# Patient Record
Sex: FEMALE
[De-identification: names, ages, dates, MRNs, and addresses within clinical notes are randomized per-mention and may not be internally consistent; named-entity substitution may affect disease eponyms.]

---

## 2017-08-02 ENCOUNTER — TRANSCRIPTION ENCOUNTER (OUTPATIENT)
Age: 82
End: 2017-08-02

## 2017-08-02 ENCOUNTER — OUTPATIENT (OUTPATIENT)
Dept: EMERGENCY DEPT | Facility: HOSPITAL | Age: 82
LOS: 1 days | Discharge: HOME | End: 2017-08-02
Payer: MEDICARE

## 2017-08-02 VITALS
DIASTOLIC BLOOD PRESSURE: 72 MMHG | RESPIRATION RATE: 16 BRPM | HEART RATE: 49 BPM | TEMPERATURE: 97 F | SYSTOLIC BLOOD PRESSURE: 123 MMHG | OXYGEN SATURATION: 100 %

## 2017-08-02 DIAGNOSIS — Z98.890 OTHER SPECIFIED POSTPROCEDURAL STATES: Chronic | ICD-10-CM

## 2017-08-02 DIAGNOSIS — E89.0 POSTPROCEDURAL HYPOTHYROIDISM: Chronic | ICD-10-CM

## 2017-08-02 DIAGNOSIS — Z90.49 ACQUIRED ABSENCE OF OTHER SPECIFIED PARTS OF DIGESTIVE TRACT: Chronic | ICD-10-CM

## 2017-08-02 DIAGNOSIS — Z95.1 PRESENCE OF AORTOCORONARY BYPASS GRAFT: Chronic | ICD-10-CM

## 2017-08-02 DIAGNOSIS — R07.9 CHEST PAIN, UNSPECIFIED: ICD-10-CM

## 2017-08-02 LAB
ALBUMIN SERPL ELPH-MCNC: 4.5 G/DL — SIGNIFICANT CHANGE UP (ref 3.3–5)
ALP SERPL-CCNC: 65 U/L — SIGNIFICANT CHANGE UP (ref 40–120)
ALT FLD-CCNC: 33 U/L RC — SIGNIFICANT CHANGE UP (ref 10–45)
ANION GAP SERPL CALC-SCNC: 12 MMOL/L — SIGNIFICANT CHANGE UP (ref 5–17)
APTT BLD: 33.4 SEC — SIGNIFICANT CHANGE UP (ref 27.5–37.4)
AST SERPL-CCNC: 25 U/L — SIGNIFICANT CHANGE UP (ref 10–40)
BILIRUB SERPL-MCNC: 0.6 MG/DL — SIGNIFICANT CHANGE UP (ref 0.2–1.2)
BUN SERPL-MCNC: 27 MG/DL — HIGH (ref 7–23)
CALCIUM SERPL-MCNC: 9.2 MG/DL — SIGNIFICANT CHANGE UP (ref 8.4–10.5)
CHLORIDE SERPL-SCNC: 98 MMOL/L — SIGNIFICANT CHANGE UP (ref 96–108)
CK MB BLD-MCNC: 1.7 % — SIGNIFICANT CHANGE UP (ref 0–3.5)
CK MB CFR SERPL CALC: 2.6 NG/ML — SIGNIFICANT CHANGE UP (ref 0–3.8)
CK SERPL-CCNC: 150 U/L — SIGNIFICANT CHANGE UP (ref 25–170)
CO2 SERPL-SCNC: 29 MMOL/L — SIGNIFICANT CHANGE UP (ref 22–31)
CREAT SERPL-MCNC: 1.46 MG/DL — HIGH (ref 0.5–1.3)
GLUCOSE SERPL-MCNC: 98 MG/DL — SIGNIFICANT CHANGE UP (ref 70–99)
HCT VFR BLD CALC: 45 % — SIGNIFICANT CHANGE UP (ref 34.5–45)
HGB BLD-MCNC: 14.8 G/DL — SIGNIFICANT CHANGE UP (ref 11.5–15.5)
INR BLD: 1.03 RATIO — SIGNIFICANT CHANGE UP (ref 0.88–1.16)
MCHC RBC-ENTMCNC: 29.2 PG — SIGNIFICANT CHANGE UP (ref 27–34)
MCHC RBC-ENTMCNC: 32.9 GM/DL — SIGNIFICANT CHANGE UP (ref 32–36)
MCV RBC AUTO: 88.6 FL — SIGNIFICANT CHANGE UP (ref 80–100)
PLATELET # BLD AUTO: 191 K/UL — SIGNIFICANT CHANGE UP (ref 150–400)
POTASSIUM SERPL-MCNC: 4.6 MMOL/L — SIGNIFICANT CHANGE UP (ref 3.5–5.3)
POTASSIUM SERPL-SCNC: 4.6 MMOL/L — SIGNIFICANT CHANGE UP (ref 3.5–5.3)
PROT SERPL-MCNC: 8.2 G/DL — SIGNIFICANT CHANGE UP (ref 6–8.3)
PROTHROM AB SERPL-ACNC: 11.2 SEC — SIGNIFICANT CHANGE UP (ref 9.8–12.7)
RBC # BLD: 5.08 M/UL — SIGNIFICANT CHANGE UP (ref 3.8–5.2)
RBC # FLD: 13 % — SIGNIFICANT CHANGE UP (ref 10.3–14.5)
SODIUM SERPL-SCNC: 139 MMOL/L — SIGNIFICANT CHANGE UP (ref 135–145)
TROPONIN T SERPL-MCNC: <0.01 NG/ML — SIGNIFICANT CHANGE UP (ref 0–0.06)
WBC # BLD: 8.8 K/UL — SIGNIFICANT CHANGE UP (ref 3.8–10.5)
WBC # FLD AUTO: 8.8 K/UL — SIGNIFICANT CHANGE UP (ref 3.8–10.5)

## 2017-08-02 PROCEDURE — 92928 PRQ TCAT PLMT NTRAC ST 1 LES: CPT | Mod: LC,GC

## 2017-08-02 PROCEDURE — 99152 MOD SED SAME PHYS/QHP 5/>YRS: CPT | Mod: GC

## 2017-08-02 PROCEDURE — 71010: CPT | Mod: 26

## 2017-08-02 PROCEDURE — 93455 CORONARY ART/GRFT ANGIO S&I: CPT | Mod: 26,59,GC

## 2017-08-02 PROCEDURE — 93010 ELECTROCARDIOGRAM REPORT: CPT

## 2017-08-02 PROCEDURE — 99285 EMERGENCY DEPT VISIT HI MDM: CPT | Mod: GC

## 2017-08-02 RX ORDER — INSULIN LISPRO 100/ML
VIAL (ML) SUBCUTANEOUS AT BEDTIME
Refills: 0 | Status: DISCONTINUED | OUTPATIENT
Start: 2017-08-02 | End: 2017-08-03

## 2017-08-02 RX ORDER — DEXTROSE 50 % IN WATER 50 %
25 SYRINGE (ML) INTRAVENOUS ONCE
Refills: 0 | Status: DISCONTINUED | OUTPATIENT
Start: 2017-08-02 | End: 2017-08-03

## 2017-08-02 RX ORDER — LISINOPRIL 2.5 MG/1
10 TABLET ORAL DAILY
Refills: 0 | Status: DISCONTINUED | OUTPATIENT
Start: 2017-08-02 | End: 2017-08-03

## 2017-08-02 RX ORDER — ACETAMINOPHEN 500 MG
650 TABLET ORAL EVERY 6 HOURS
Refills: 0 | Status: DISCONTINUED | OUTPATIENT
Start: 2017-08-02 | End: 2017-08-03

## 2017-08-02 RX ORDER — DEXTROSE 50 % IN WATER 50 %
1 SYRINGE (ML) INTRAVENOUS ONCE
Refills: 0 | Status: DISCONTINUED | OUTPATIENT
Start: 2017-08-02 | End: 2017-08-03

## 2017-08-02 RX ORDER — SODIUM CHLORIDE 9 MG/ML
1000 INJECTION, SOLUTION INTRAVENOUS
Refills: 0 | Status: DISCONTINUED | OUTPATIENT
Start: 2017-08-02 | End: 2017-08-03

## 2017-08-02 RX ORDER — ASPIRIN/CALCIUM CARB/MAGNESIUM 324 MG
81 TABLET ORAL DAILY
Refills: 0 | Status: DISCONTINUED | OUTPATIENT
Start: 2017-08-02 | End: 2017-08-03

## 2017-08-02 RX ORDER — LEVOTHYROXINE SODIUM 125 MCG
100 TABLET ORAL DAILY
Refills: 0 | Status: DISCONTINUED | OUTPATIENT
Start: 2017-08-02 | End: 2017-08-03

## 2017-08-02 RX ORDER — FUROSEMIDE 40 MG
40 TABLET ORAL DAILY
Refills: 0 | Status: DISCONTINUED | OUTPATIENT
Start: 2017-08-02 | End: 2017-08-03

## 2017-08-02 RX ORDER — ALLOPURINOL 300 MG
100 TABLET ORAL DAILY
Refills: 0 | Status: DISCONTINUED | OUTPATIENT
Start: 2017-08-02 | End: 2017-08-03

## 2017-08-02 RX ORDER — DEXTROSE 50 % IN WATER 50 %
12.5 SYRINGE (ML) INTRAVENOUS ONCE
Refills: 0 | Status: DISCONTINUED | OUTPATIENT
Start: 2017-08-02 | End: 2017-08-03

## 2017-08-02 RX ORDER — INSULIN LISPRO 100/ML
3 VIAL (ML) SUBCUTANEOUS
Refills: 0 | Status: DISCONTINUED | OUTPATIENT
Start: 2017-08-02 | End: 2017-08-03

## 2017-08-02 RX ORDER — NITROGLYCERIN 6.5 MG
1 CAPSULE, EXTENDED RELEASE ORAL ONCE
Refills: 0 | Status: COMPLETED | OUTPATIENT
Start: 2017-08-02 | End: 2017-08-02

## 2017-08-02 RX ORDER — CLOPIDOGREL BISULFATE 75 MG/1
75 TABLET, FILM COATED ORAL DAILY
Refills: 0 | Status: DISCONTINUED | OUTPATIENT
Start: 2017-08-02 | End: 2017-08-03

## 2017-08-02 RX ORDER — SODIUM CHLORIDE 9 MG/ML
1000 INJECTION INTRAMUSCULAR; INTRAVENOUS; SUBCUTANEOUS
Refills: 0 | Status: DISCONTINUED | OUTPATIENT
Start: 2017-08-02 | End: 2017-08-03

## 2017-08-02 RX ORDER — SODIUM CHLORIDE 9 MG/ML
1000 INJECTION INTRAMUSCULAR; INTRAVENOUS; SUBCUTANEOUS
Refills: 0 | Status: DISCONTINUED | OUTPATIENT
Start: 2017-08-02 | End: 2017-08-02

## 2017-08-02 RX ORDER — ATORVASTATIN CALCIUM 80 MG/1
80 TABLET, FILM COATED ORAL AT BEDTIME
Refills: 0 | Status: DISCONTINUED | OUTPATIENT
Start: 2017-08-02 | End: 2017-08-03

## 2017-08-02 RX ORDER — GLUCAGON INJECTION, SOLUTION 0.5 MG/.1ML
1 INJECTION, SOLUTION SUBCUTANEOUS ONCE
Refills: 0 | Status: DISCONTINUED | OUTPATIENT
Start: 2017-08-02 | End: 2017-08-03

## 2017-08-02 RX ORDER — SPIRONOLACTONE 25 MG/1
25 TABLET, FILM COATED ORAL DAILY
Refills: 0 | Status: DISCONTINUED | OUTPATIENT
Start: 2017-08-02 | End: 2017-08-03

## 2017-08-02 RX ORDER — INSULIN GLARGINE 100 [IU]/ML
35 INJECTION, SOLUTION SUBCUTANEOUS AT BEDTIME
Refills: 0 | Status: DISCONTINUED | OUTPATIENT
Start: 2017-08-02 | End: 2017-08-03

## 2017-08-02 RX ORDER — METOPROLOL TARTRATE 50 MG
100 TABLET ORAL
Refills: 0 | Status: DISCONTINUED | OUTPATIENT
Start: 2017-08-02 | End: 2017-08-03

## 2017-08-02 RX ORDER — INSULIN LISPRO 100/ML
VIAL (ML) SUBCUTANEOUS
Refills: 0 | Status: DISCONTINUED | OUTPATIENT
Start: 2017-08-02 | End: 2017-08-03

## 2017-08-02 RX ADMIN — Medication 1 INCH(S): at 14:41

## 2017-08-02 RX ADMIN — Medication 100 MILLIGRAM(S): at 18:16

## 2017-08-02 RX ADMIN — INSULIN GLARGINE 35 UNIT(S): 100 INJECTION, SOLUTION SUBCUTANEOUS at 22:15

## 2017-08-02 RX ADMIN — ATORVASTATIN CALCIUM 80 MILLIGRAM(S): 80 TABLET, FILM COATED ORAL at 22:15

## 2017-08-02 NOTE — ED ADULT NURSE NOTE - OBJECTIVE STATEMENT
81yo female AxOx3 ambulatory to ED c/o CP x2wks radiating to L arm and neck. Pt had silent MI 1yr ago and states pain is similar to event but a little worse. Pt denies SOB/N/V/D/fever/chills. Steady gait. Denies cough. 83yo female AxOx3 ambulatory to ED c/o CP x2wks radiating to L arm and neck. Pt had silent MI 1yr ago and states pain is similar to event but a little worse. Pt denies SOB/N/V/D/fever/chills. Steady gait. Denies cough. B/L lungs CTA, resp even, unlabored. Full ROMx4. Abd soft/NT/ND/+BSx4. Skin intact. NAD noted. Non-diaphoretic. #18G LAC, labs drawn and sent. Spouse at bedside. Safety maintained.

## 2017-08-02 NOTE — H&P CARDIOLOGY - PSH
H/O aortic valve repair    H/O mitral valve repair    H/O thyroidectomy    History of cholecystectomy    S/P CABG x 3

## 2017-08-02 NOTE — ED PROVIDER NOTE - MEDICAL DECISION MAKING DETAILS
Likely unstable angina, EKG with sinus bradycardia and PVCs. D/w Cardiology fellow, plan to admit to Dr. Urbano for cardiac cath

## 2017-08-02 NOTE — H&P CARDIOLOGY - PMH
Coronary artery disease    Diabetes    Gout    HTN (hypertension)    Hypothyroid    Valvular heart disease Chronic heart failure, unspecified heart failure type    Coronary artery disease    Diabetes    Gout    HTN (hypertension)    Hypothyroid    Valvular heart disease Chronic heart failure, unspecified heart failure type    CKD (chronic kidney disease) stage 3, GFR 30-59 ml/min    Coronary artery disease    Diabetes    Gout    HTN (hypertension)    Hypothyroid    Valvular heart disease

## 2017-08-02 NOTE — ED PROVIDER NOTE - ATTENDING CONTRIBUTION TO CARE
I have seen and evaluated this patient with the resident.   I agree with the findings  unless other wise stated.  I have made appropriate changes in documentations where needed, After my face to face bedside evaluation, I am further  noting: Pt with chest pain with change in severity no fever no syncope h/o CAD Likely unstable angina, EKG with sinus bradycardia and PVCs. D/w Cardiology fellow, plan to admit to Dr. Urbano for cardiac cath  _ Carr

## 2017-08-02 NOTE — H&P CARDIOLOGY - HISTORY OF PRESENT ILLNESS
81yo F PMH Hypothyrodism, CAD s/p CABG, s/p 25 stents, last PCI in 03/2013 with brachytherapy, AV and MV repair, DM2 on Insulin, uncontrolled complicated by retinopathy, managed by Endocrinology, TIA no residual deficits, sent by Cardiologist - Dr. Lam Painting, 2/2 worsening chest pain x weeks. Pt reports chronic angina for many years, but has worsened in the past several weeks. Pain occurs at rest, left-sided, radiating to L arm, describes as needles, rates it 3/10, lasts for a few mins, uses nitro spray with relief. Frequency of chest pain has increased. Denies any associated SOB, diaphoresis, nausea, vomiting. Denies fevers, chills. Endorses YOUNG but states she walks 5 miles regularly.  Patient received 325mg of ASA @ Cardiologist office.  In ED Cardiac biomarkers negative x 1 set, ProBNP 400, Nitropaste applied with relief of CP.  Patient is now transferred to Cath lab for further evaluation including LHC. 81yo F PMH HFpEF 68% in 2016, Hypothyroidism CAD s/p CABG, s/p 25 stents, last PCI in 03/2013 with brachytherapy, AV and MV repair, DM2 on Insulin, uncontrolled complicated by retinopathy, managed by Endocrinology, TIA no residual deficits, sent by Cardiologist - Dr. Lam Painting, 2/2 worsening chest pain x weeks. Pt reports chronic angina for many years, but has worsened in the past several weeks. Pain occurs at rest, left-sided, radiating to L arm, describes as needles, rates it 3/10, lasts for a few mins, uses nitro spray with relief. Frequency of chest pain has increased. Denies any associated SOB, diaphoresis, nausea, vomiting. Denies fevers, chills. Endorses YOUNG but states she walks 5 miles regularly.  Patient received 325mg of ASA @ Cardiologist office.  In ED Cardiac biomarkers negative x 1 set, ProBNP 400, Nitropaste applied with relief of CP.  Patient is now transferred to Cath lab for further evaluation including LHC. 81yo F PMH CKD III, HFpEF 68% in 2016, Hypothyroidism CAD s/p CABG, s/p 25 stents, last PCI in 03/2013 with brachytherapy, AV and MV repair, DM2 on Insulin, uncontrolled complicated by retinopathy, managed by Endocrinology, TIA no residual deficits, sent by Cardiologist - Dr. Lam Painting, 2/2 worsening chest pain x weeks. Pt reports chronic angina for many years, but has worsened in the past several weeks. Pain occurs at rest, left-sided, radiating to L arm, describes as needles, rates it 3/10, lasts for a few mins, uses nitro spray with relief. Frequency of chest pain has increased. Denies any associated SOB, diaphoresis, nausea, vomiting. Denies fevers, chills. Endorses YOUNG but states she walks 5 miles regularly.  Patient received 325mg of ASA @ Cardiologist office.  In ED Cardiac biomarkers negative x 1 set, ProBNP 400, Nitropaste applied with relief of CP.  Patient is now transferred to Cath lab for further evaluation including LHC.

## 2017-08-02 NOTE — PROGRESS NOTE ADULT - SUBJECTIVE AND OBJECTIVE BOX
Removal of Femoral Sheath    Pulses in the right lower extremity are palpable. The patient was placed in the supine position. The insertion site was identified and the sutures were removed per protocol.  The 6 [place] femoral sheath was then removed. Direct pressure was applied for 20 minutes.     Monitoring of the right groin and both lower extremities including neuro-vascular checks and vital signs every 15 minutes x 4, then every 30 minutes x 2, then every 1 hour was ordered.    Complications: None    Comments:

## 2017-08-02 NOTE — ED PROVIDER NOTE - OBJECTIVE STATEMENT
81yo F PMH CAD s/p CABG, s/p 25 stents, last PCI in 03/2013 with brachytherapy, AVR and MV repair, DM2, TIA, sent by PMD for worsening chest pain x weeks. Pt reports chronic angina for many years, but has worsened in the past several weeks. Pain occurs at rest, left-sided, radiating to L arm, describes as needles, rates it 3/10, lasts for a few mins, uses nitro spray with relief. Frequency of chest pain has increased. Denies any associated SOB, diaphoresis, nausea, vomiting. Denies fevers, chills. Endorses YOUNG but states she walks 5 miles regularly.

## 2017-08-03 VITALS
TEMPERATURE: 97 F | RESPIRATION RATE: 17 BRPM | DIASTOLIC BLOOD PRESSURE: 57 MMHG | HEART RATE: 54 BPM | SYSTOLIC BLOOD PRESSURE: 120 MMHG | OXYGEN SATURATION: 96 %

## 2017-08-03 DIAGNOSIS — E11.9 TYPE 2 DIABETES MELLITUS WITHOUT COMPLICATIONS: ICD-10-CM

## 2017-08-03 DIAGNOSIS — I10 ESSENTIAL (PRIMARY) HYPERTENSION: ICD-10-CM

## 2017-08-03 DIAGNOSIS — I25.118 ATHEROSCLEROTIC HEART DISEASE OF NATIVE CORONARY ARTERY WITH OTHER FORMS OF ANGINA PECTORIS: ICD-10-CM

## 2017-08-03 LAB
ANION GAP SERPL CALC-SCNC: 10 MMOL/L — SIGNIFICANT CHANGE UP (ref 5–17)
BUN SERPL-MCNC: 26 MG/DL — HIGH (ref 7–23)
CALCIUM SERPL-MCNC: 8.9 MG/DL — SIGNIFICANT CHANGE UP (ref 8.4–10.5)
CHLORIDE SERPL-SCNC: 100 MMOL/L — SIGNIFICANT CHANGE UP (ref 96–108)
CO2 SERPL-SCNC: 28 MMOL/L — SIGNIFICANT CHANGE UP (ref 22–31)
CREAT SERPL-MCNC: 1.42 MG/DL — HIGH (ref 0.5–1.3)
GLUCOSE SERPL-MCNC: 179 MG/DL — HIGH (ref 70–99)
HCT VFR BLD CALC: 39.7 % — SIGNIFICANT CHANGE UP (ref 34.5–45)
HGB BLD-MCNC: 13.5 G/DL — SIGNIFICANT CHANGE UP (ref 11.5–15.5)
MCHC RBC-ENTMCNC: 29.8 PG — SIGNIFICANT CHANGE UP (ref 27–34)
MCHC RBC-ENTMCNC: 33.9 GM/DL — SIGNIFICANT CHANGE UP (ref 32–36)
MCV RBC AUTO: 88 FL — SIGNIFICANT CHANGE UP (ref 80–100)
PLATELET # BLD AUTO: 142 K/UL — LOW (ref 150–400)
POTASSIUM SERPL-MCNC: 5.1 MMOL/L — SIGNIFICANT CHANGE UP (ref 3.5–5.3)
POTASSIUM SERPL-SCNC: 5.1 MMOL/L — SIGNIFICANT CHANGE UP (ref 3.5–5.3)
RBC # BLD: 4.51 M/UL — SIGNIFICANT CHANGE UP (ref 3.8–5.2)
RBC # FLD: 12.9 % — SIGNIFICANT CHANGE UP (ref 10.3–14.5)
SODIUM SERPL-SCNC: 138 MMOL/L — SIGNIFICANT CHANGE UP (ref 135–145)
WBC # BLD: 7.5 K/UL — SIGNIFICANT CHANGE UP (ref 3.8–10.5)
WBC # FLD AUTO: 7.5 K/UL — SIGNIFICANT CHANGE UP (ref 3.8–10.5)

## 2017-08-03 PROCEDURE — 85027 COMPLETE CBC AUTOMATED: CPT

## 2017-08-03 PROCEDURE — 80048 BASIC METABOLIC PNL TOTAL CA: CPT

## 2017-08-03 PROCEDURE — 99153 MOD SED SAME PHYS/QHP EA: CPT

## 2017-08-03 PROCEDURE — C9600: CPT | Mod: LC

## 2017-08-03 PROCEDURE — 82553 CREATINE MB FRACTION: CPT

## 2017-08-03 PROCEDURE — C1887: CPT

## 2017-08-03 PROCEDURE — 85610 PROTHROMBIN TIME: CPT

## 2017-08-03 PROCEDURE — 93005 ELECTROCARDIOGRAM TRACING: CPT

## 2017-08-03 PROCEDURE — 83880 ASSAY OF NATRIURETIC PEPTIDE: CPT

## 2017-08-03 PROCEDURE — 85730 THROMBOPLASTIN TIME PARTIAL: CPT

## 2017-08-03 PROCEDURE — 93455 CORONARY ART/GRFT ANGIO S&I: CPT | Mod: 59

## 2017-08-03 PROCEDURE — 71045 X-RAY EXAM CHEST 1 VIEW: CPT

## 2017-08-03 PROCEDURE — C1769: CPT

## 2017-08-03 PROCEDURE — 84484 ASSAY OF TROPONIN QUANT: CPT

## 2017-08-03 PROCEDURE — C1874: CPT

## 2017-08-03 PROCEDURE — 80053 COMPREHEN METABOLIC PANEL: CPT

## 2017-08-03 PROCEDURE — 93010 ELECTROCARDIOGRAM REPORT: CPT

## 2017-08-03 PROCEDURE — 82550 ASSAY OF CK (CPK): CPT

## 2017-08-03 PROCEDURE — C1894: CPT

## 2017-08-03 PROCEDURE — C1725: CPT

## 2017-08-03 PROCEDURE — 99152 MOD SED SAME PHYS/QHP 5/>YRS: CPT

## 2017-08-03 RX ORDER — ACETAMINOPHEN 500 MG
2 TABLET ORAL
Qty: 0 | Refills: 0 | DISCHARGE
Start: 2017-08-03

## 2017-08-03 RX ADMIN — LISINOPRIL 10 MILLIGRAM(S): 2.5 TABLET ORAL at 05:36

## 2017-08-03 RX ADMIN — Medication 40 MILLIGRAM(S): at 05:37

## 2017-08-03 RX ADMIN — SPIRONOLACTONE 25 MILLIGRAM(S): 25 TABLET, FILM COATED ORAL at 05:36

## 2017-08-03 RX ADMIN — Medication 100 MICROGRAM(S): at 05:36

## 2017-08-03 RX ADMIN — Medication 81 MILLIGRAM(S): at 05:36

## 2017-08-03 RX ADMIN — Medication 1 INCH(S): at 02:12

## 2017-08-03 RX ADMIN — CLOPIDOGREL BISULFATE 75 MILLIGRAM(S): 75 TABLET, FILM COATED ORAL at 05:36

## 2017-08-03 NOTE — DISCHARGE NOTE ADULT - HOSPITAL COURSE
81yo F PMH CKD III, HFpEF 68% in 2016, Hypothyroidism CAD s/p CABG, s/p 25 stents, last PCI in 03/2013 with brachytherapy, AV and MV repair, DM2 on Insulin, uncontrolled complicated by retinopathy, managed by Endocrinology, TIA no residual deficits, sent by Cardiologist - Dr. Lam Painting, 2/2 worsening chest pain x weeks. Pt reports chronic angina for many years, but has worsened in the past several weeks. Pain occurs at rest, left-sided, radiating to L arm, describes as needles, rates it 3/10, lasts for a few mins, uses nitro spray with relief. Frequency of chest pain has increased. Denies any associated SOB, diaphoresis, nausea, vomiting. Denies fevers, chills. Endorses YOUNG but states she walks 5 miles regularly.  Patient received 325mg of ASA @ Cardiologist office.  In ED Cardiac biomarkers negative x 1 set, ProBNP 400, Nitropaste applied with relief of CP.  Patient is now transferred to Cath lab for further evaluation including LHC. (02 Aug 2017 15:39)    8/2 cardiac cath with one stent to the OM1. Right femoral insertion site without swelling, bleeding.

## 2017-08-03 NOTE — PROGRESS NOTE ADULT - SUBJECTIVE AND OBJECTIVE BOX
2- Hours Post Removal of Femoral Sheath Assessment of Access Site    Vital signs are stable, neuro-vascular status of the lower extremities is intact, stable and there is no evidence of hematoma on the right lower extremities.     Complications: None      Comments:

## 2017-08-03 NOTE — DISCHARGE NOTE ADULT - CARE PLAN
Principal Discharge DX:	Coronary artery disease of native artery of native heart with stable angina pectoris  Goal:	Patient remains chest pain free and understands post cath discharge instructions.  Instructions for follow-up, activity and diet:	No heavy lifting, strenuous activity, bending, straining, or unnecessary stair climbing for 2 weeks. No driving for 2 days. You may shower 24 hours following the procedure but avoid baths/swimming for 1 week. Check your groin site for bleeding and/or swelling daily following procedure and call your doctor immediately if it occurs or if you experience increased pain at the site. Follow up with your cardiologist in 1-2 weeks. You may call [facility] if you have any questions/concerns regarding your procedure [phone]. Do not stop you Aspirin or Plavix unless instructed to do so by your cardiologist.  Secondary Diagnosis:	Essential hypertension  Goal:	Your blood pressure will be controlled.  Instructions for follow-up, activity and diet:	Continue with your blood pressure medications; eat a heart healthy diet with low salt diet; exercise regularly (consult with your physician or cardiologist first); maintain a heart healthy weight; if you smoke - quit (A resource to help you stop smoking is the [facility] – phone number 805-008-0937.); include healthy ways to manage stress. Continue to follow with your primary care physician or cardiologist.  Secondary Diagnosis:	Type 2 diabetes mellitus without complication, with long-term current use of insulin  Goal:	Your hemoglobin A1C will be between 7-8  Instructions for follow-up, activity and diet:	Continue to follow with your primary care MD or your endocrinologist.  Follow a heart healthy diabetic diet. If you check your fingerstick glucose at home, call your MD if it is greater than 250mg/dL on 2 occasions or less than 100mg/dL on 2 occasions. Know signs of low blood sugar, such as: dizziness, shakiness, sweating, confusion, hunger, nervousness-drink 4 ounces apple juice if occurs and call your doctor. Know early signs of high blood sugar, such as: frequent urination, increased thirst, blurry vision, fatigue, headache - call your doctor if this occurs. Follow with other practitioners to care for your diabetes, such as ophthamologist and podiatrist. Principal Discharge DX:	Coronary artery disease of native artery of native heart with stable angina pectoris  Goal:	Patient remains chest pain free and understands post cath discharge instructions.  Instructions for follow-up, activity and diet:	No heavy lifting, strenuous activity, bending, straining, or unnecessary stair climbing for 2 weeks. No driving for 2 days. You may shower 24 hours following the procedure but avoid baths/swimming for 1 week. Check your groin site for bleeding and/or swelling daily following procedure and call your doctor immediately if it occurs or if you experience increased pain at the site. Follow up with your cardiologist in 1-2 weeks. You may call [facility] if you have any questions/concerns regarding your procedure [phone]. Do not stop you Aspirin or Plavix unless instructed to do so by your cardiologist.  Secondary Diagnosis:	Essential hypertension  Goal:	Your blood pressure will be controlled.  Instructions for follow-up, activity and diet:	Continue with your blood pressure medications; eat a heart healthy diet with low salt diet; exercise regularly (consult with your physician or cardiologist first); maintain a heart healthy weight; if you smoke - quit (A resource to help you stop smoking is the [facility] – phone number 262-846-3918.); include healthy ways to manage stress. Continue to follow with your primary care physician or cardiologist.  Secondary Diagnosis:	Type 2 diabetes mellitus without complication, with long-term current use of insulin  Goal:	Your hemoglobin A1C will be between 7-8  Instructions for follow-up, activity and diet:	Continue to follow with your primary care MD or your endocrinologist.  Follow a heart healthy diabetic diet. If you check your fingerstick glucose at home, call your MD if it is greater than 250mg/dL on 2 occasions or less than 100mg/dL on 2 occasions. Know signs of low blood sugar, such as: dizziness, shakiness, sweating, confusion, hunger, nervousness-drink 4 ounces apple juice if occurs and call your doctor. Know early signs of high blood sugar, such as: frequent urination, increased thirst, blurry vision, fatigue, headache - call your doctor if this occurs. Follow with other practitioners to care for your diabetes, such as ophthamologist and podiatrist. Principal Discharge DX:	Coronary artery disease of native artery of native heart with stable angina pectoris  Goal:	Patient remains chest pain free and understands post cath discharge instructions.  Instructions for follow-up, activity and diet:	No heavy lifting, strenuous activity, bending, straining, or unnecessary stair climbing for 2 weeks. No driving for 2 days. You may shower 24 hours following the procedure but avoid baths/swimming for 1 week. Check your groin site for bleeding and/or swelling daily following procedure and call your doctor immediately if it occurs or if you experience increased pain at the site. Follow up with your cardiologist in 1-2 weeks. You may call [facility] if you have any questions/concerns regarding your procedure [phone]. Do not stop you Aspirin or Plavix unless instructed to do so by your cardiologist.  Secondary Diagnosis:	Essential hypertension  Goal:	Your blood pressure will be controlled.  Instructions for follow-up, activity and diet:	Continue with your blood pressure medications; eat a heart healthy diet with low salt diet; exercise regularly (consult with your physician or cardiologist first); maintain a heart healthy weight; if you smoke - quit (A resource to help you stop smoking is the [facility] – phone number 758-020-1515.); include healthy ways to manage stress. Continue to follow with your primary care physician or cardiologist.  Secondary Diagnosis:	Type 2 diabetes mellitus without complication, with long-term current use of insulin  Goal:	Your hemoglobin A1C will be between 7-8  Instructions for follow-up, activity and diet:	Continue to follow with your primary care MD or your endocrinologist.  Follow a heart healthy diabetic diet. If you check your fingerstick glucose at home, call your MD if it is greater than 250mg/dL on 2 occasions or less than 100mg/dL on 2 occasions. Know signs of low blood sugar, such as: dizziness, shakiness, sweating, confusion, hunger, nervousness-drink 4 ounces apple juice if occurs and call your doctor. Know early signs of high blood sugar, such as: frequent urination, increased thirst, blurry vision, fatigue, headache - call your doctor if this occurs. Follow with other practitioners to care for your diabetes, such as ophthamologist and podiatrist. Principal Discharge DX:	Coronary artery disease of native artery of native heart with stable angina pectoris  Goal:	Patient remains chest pain free and understands post cath discharge instructions.  Instructions for follow-up, activity and diet:	No heavy lifting, strenuous activity, bending, straining, or unnecessary stair climbing for 2 weeks. No driving for 2 days. You may shower 24 hours following the procedure but avoid baths/swimming for 1 week. Check your groin site for bleeding and/or swelling daily following procedure and call your doctor immediately if it occurs or if you experience increased pain at the site. Follow up with your cardiologist in 1-2 weeks. You may call [facility] if you have any questions/concerns regarding your procedure [phone]. Do not stop you Aspirin or Plavix unless instructed to do so by your cardiologist.  Secondary Diagnosis:	Essential hypertension  Goal:	Your blood pressure will be controlled.  Instructions for follow-up, activity and diet:	Continue with your blood pressure medications; eat a heart healthy diet with low salt diet; exercise regularly (consult with your physician or cardiologist first); maintain a heart healthy weight; if you smoke - quit (A resource to help you stop smoking is the [facility] – phone number 724-701-8404.); include healthy ways to manage stress. Continue to follow with your primary care physician or cardiologist.  Secondary Diagnosis:	Type 2 diabetes mellitus without complication, with long-term current use of insulin  Goal:	Your hemoglobin A1C will be between 7-8  Instructions for follow-up, activity and diet:	Continue to follow with your primary care MD or your endocrinologist.  Follow a heart healthy diabetic diet. If you check your fingerstick glucose at home, call your MD if it is greater than 250mg/dL on 2 occasions or less than 100mg/dL on 2 occasions. Know signs of low blood sugar, such as: dizziness, shakiness, sweating, confusion, hunger, nervousness-drink 4 ounces apple juice if occurs and call your doctor. Know early signs of high blood sugar, such as: frequent urination, increased thirst, blurry vision, fatigue, headache - call your doctor if this occurs. Follow with other practitioners to care for your diabetes, such as ophthamologist and podiatrist.

## 2017-08-03 NOTE — DISCHARGE NOTE ADULT - PATIENT PORTAL LINK FT
“You can access the FollowHealth Patient Portal, offered by [facility], by registering with the following website: [email or web address]”

## 2017-08-03 NOTE — PROGRESS NOTE ADULT - ASSESSMENT
HPI:  81yo F PMH CKD III, HFpEF 68% in 2016, Hypothyroidism CAD s/p CABG, s/p 25 stents, last PCI in 03/2013 with brachytherapy, AV and MV repair, DM2 on Insulin, uncontrolled complicated by retinopathy, managed by Endocrinology, TIA no residual deficits, sent by Cardiologist - Dr. Lam Painting, 2/2 worsening chest pain x weeks. Pt reports chronic angina for many years, but has worsened in the past several weeks. Pain occurs at rest, left-sided, radiating to L arm, describes as needles, rates it 3/10, lasts for a few mins, uses nitro spray with relief. Frequency of chest pain has increased. Denies any associated SOB, diaphoresis, nausea, vomiting. Denies fevers, chills. Endorses YOUNG but states she walks 5 miles regularly.  Patient received 325mg of ASA @ Cardiologist office.  In ED Cardiac biomarkers negative x 1 set, ProBNP 400, Nitropaste applied with relief of CP.  Patient is now transferred to Cath lab for further evaluation including LHC. (02 Aug 2017 15:39)

## 2017-08-03 NOTE — DISCHARGE NOTE ADULT - MEDICATION SUMMARY - MEDICATIONS TO TAKE
I will START or STAY ON the medications listed below when I get home from the hospital:    spironolactone 25 mg oral tablet  -- 1 tab(s) by mouth once a day  -- Indication: For Hypertension    acetaminophen 325 mg oral tablet  -- 2 tab(s) by mouth every 6 hours, As needed, Mild Pain (1 - 3)  -- Indication: For mild pain    Aspirin Enteric Coated 81 mg oral delayed release tablet  -- 1 tab(s) by mouth once a day  -- Indication: For Stented coronary artery    ramipril 2.5 mg oral capsule  -- 1 cap(s) by mouth once a day  -- Indication: For Hypertension    Nitrolingual 0.4 mg sublingual spray  -- 1 spray(s) under tongue every 5 minutes, As Needed  -- Indication: For angina    HumaLOG 100 units/mL subcutaneous solution  -- 6 unit(s) subcutaneous 2 times a day  -- Indication: For diabetes type 2    Levemir 100 units/mL subcutaneous solution  -- 44 unit(s) subcutaneous once a day (at bedtime)  -- Indication: For diabetes type 2    glimepiride 4 mg oral tablet  -- 1 tab(s) by mouth once a day  -- Indication: For diabetes type 2    allopurinol 100 mg oral tablet  -- 100 milligram(s) by mouth once a day  -- Indication: For Gout    atorvastatin 80 mg oral tablet  -- 1 tab(s) by mouth once a day  -- Indication: For Hyperlipidemia    clopidogrel 75 mg oral tablet  -- 1 tab(s) by mouth once a day  -- Indication: For Stented coronary artery    metoprolol tartrate 100 mg oral tablet  -- 1 tab(s) by mouth 2 times a day  -- Indication: For Hypertension    furosemide 40 mg oral tablet  -- 1 tab(s) by mouth once a day  -- Indication: For Chronic heart failure    levothyroxine 100 mcg (0.1 mg) oral capsule  -- 1 cap(s) by mouth once a day  -- Indication: For Hypothyroid

## 2017-08-03 NOTE — PROGRESS NOTE ADULT - SUBJECTIVE AND OBJECTIVE BOX
Patient is a 82y old  Female who presents with a chief complaint of chest pain (03 Aug 2017 00:54)          Allergies    Lidocaine Hydrochloride (Other)    Intolerances        Medications:  spironolactone 25 milliGRAM(s) Oral daily  aspirin enteric coated 81 milliGRAM(s) Oral daily  lisinopril 10 milliGRAM(s) Oral daily  allopurinol 100 milliGRAM(s) Oral daily  atorvastatin 80 milliGRAM(s) Oral at bedtime  clopidogrel Tablet 75 milliGRAM(s) Oral daily  metoprolol 100 milliGRAM(s) Oral two times a day  furosemide    Tablet 40 milliGRAM(s) Oral daily  levothyroxine 100 MICROGram(s) Oral daily  insulin glargine Injectable (LANTUS) 35 Unit(s) SubCutaneous at bedtime  insulin lispro Injectable (HumaLOG) 3 Unit(s) SubCutaneous three times a day before meals  insulin lispro (HumaLOG) corrective regimen sliding scale   SubCutaneous three times a day before meals  insulin lispro (HumaLOG) corrective regimen sliding scale   SubCutaneous at bedtime  dextrose 5%. 1000 milliLiter(s) IV Continuous <Continuous>  dextrose Gel 1 Dose(s) Oral once PRN  dextrose 50% Injectable 12.5 Gram(s) IV Push once  dextrose 50% Injectable 25 Gram(s) IV Push once  dextrose 50% Injectable 25 Gram(s) IV Push once  glucagon  Injectable 1 milliGRAM(s) IntraMuscular once PRN  sodium chloride 0.9%. 1000 milliLiter(s) IV Continuous <Continuous>  acetaminophen   Tablet. 650 milliGRAM(s) Oral every 6 hours PRN      Vitals:  T(C): 36.3 (17 @ 05:33), Max: 36.9 (17 @ 20:20)  HR: 54 (17 @ 05:33) (47 - 54)  BP: 120/57 (17 @ 05:33) (105/52 - 147/64)  BP(mean): 91 (17 @ 15:39) (91 - 91)  RR: 17 (17 @ 05:33) (15 - 17)  SpO2: 96% (17 @ 05:33) (96% - 100%)  Wt(kg): --  Daily Height in cm: 165.1 (02 Aug 2017 17:50)    Daily Weight in k.3 (03 Aug 2017 00:54)  I&O's Summary    02 Aug 2017 07:01  -  03 Aug 2017 06:27  --------------------------------------------------------  IN: 150 mL / OUT: 0 mL / NET: 150 mL          Physical Exam:  Appearance: Normal  Eyes: PERRL, EOMI  HENT: Normal oral mus[place], NC/AT  Cardiovascular: S1S2, RRR, No M/R/G, no JVD, No Lower extremity edema  Procedural Access Site: No hematoma, Non-tender to palpation, 2+ pulse, No bruit, No Ecchymosis  Respiratory: Clear to auscultation bilaterally  Gastrointestinal: Soft, Non tender, Normal Bowel Sounds  Musculoskeletal: No clubbing, No joint deformity   Neurologic: Non-focal  Lymphatic: No lymphadenopathy  Psychiatry: AAOx3, Mood & affect appropriate  Skin: No rashes, No ecchymoses, No cyanosis        138  |  100  |  26<H>  ----------------------------<  179<H>  5.1   |  28  |  1.42<H>    Ca    8.9      03 Aug 2017 01:12    TPro  8.2  /  Alb  4.5  /  TBili  0.6  /  DBili  x   /  AST  25  /  ALT  33  /  AlkPhos  65  0802    PT/INR - ( 02 Aug 2017 15:01 )   PT: 11.2 sec;   INR: 1.03 ratio         PTT - ( 02 Aug 2017 15:01 )  PTT:33.4 sec  CARDIAC MARKERS ( 02 Aug 2017 15:01 )  x     / <0.01 ng/mL / 150 U/L / x     / 2.6 ng/mL      Serum Pro-Brain Natriuretic Peptide: 400 pg/mL ( @ 15:01)    Lipid panel   Hgb A1c                         13.5   7.5   )-----------( 142      ( 03 Aug 2017 01:12 )             39.7         ECG: SB 52 bpm    Echo:    Stress Testing:     Cath: one stent to the prox OM    Imaging:    Interpretation of Telemetry: SB/SR 48-60 bpm

## 2017-08-03 NOTE — PROGRESS NOTE ADULT - ATTENDING COMMENTS
Keith Hernandez, [facility]    [phone]
Keith Hernandez, [facility]    [phone]
Kieth Hernandez, [facility]    [phone]

## 2017-08-03 NOTE — DISCHARGE NOTE ADULT - PLAN OF CARE
Patient remains chest pain free and understands post cath discharge instructions. No heavy lifting, strenuous activity, bending, straining, or unnecessary stair climbing for 2 weeks. No driving for 2 days. You may shower 24 hours following the procedure but avoid baths/swimming for 1 week. Check your groin site for bleeding and/or swelling daily following procedure and call your doctor immediately if it occurs or if you experience increased pain at the site. Follow up with your cardiologist in 1-2 weeks. You may call [facility] if you have any questions/concerns regarding your procedure [phone]. Do not stop you Aspirin or Plavix unless instructed to do so by your cardiologist. Your blood pressure will be controlled. Continue with your blood pressure medications; eat a heart healthy diet with low salt diet; exercise regularly (consult with your physician or cardiologist first); maintain a heart healthy weight; if you smoke - quit (A resource to help you stop smoking is the [facility] – phone number [phone].); include healthy ways to manage stress. Continue to follow with your primary care physician or cardiologist. Continue to follow with your primary care MD or your endocrinologist.  Follow a heart healthy diabetic diet. If you check your fingerstick glucose at home, call your MD if it is greater than 250mg/dL on 2 occasions or less than 100mg/dL on 2 occasions. Know signs of low blood sugar, such as: dizziness, shakiness, sweating, confusion, hunger, nervousness-drink 4 ounces apple juice if occurs and call your doctor. Know early signs of high blood sugar, such as: frequent urination, increased thirst, blurry vision, fatigue, headache - call your doctor if this occurs. Follow with other practitioners to care for your diabetes, such as ophthamologist and podiatrist. Your hemoglobin A1C will be between 7-8

## 2017-08-03 NOTE — DISCHARGE NOTE ADULT - CARE PROVIDER_API CALL
Lam Painting), Cardiovascular Disease; Internal Medicine  [id number]  [place] [address]  Phone: [phone]  Fax: [phone]

## 2017-08-05 ENCOUNTER — INPATIENT (INPATIENT)
Facility: HOSPITAL | Age: 82
LOS: 2 days | Discharge: HOME | DRG: 309 | End: 2017-08-08
Attending: INTERNAL MEDICINE | Admitting: INTERNAL MEDICINE
Payer: MEDICARE

## 2017-08-05 VITALS
TEMPERATURE: 98 F | HEART RATE: 41 BPM | RESPIRATION RATE: 18 BRPM | SYSTOLIC BLOOD PRESSURE: 112 MMHG | OXYGEN SATURATION: 99 % | DIASTOLIC BLOOD PRESSURE: 56 MMHG

## 2017-08-05 DIAGNOSIS — Z98.890 OTHER SPECIFIED POSTPROCEDURAL STATES: Chronic | ICD-10-CM

## 2017-08-05 DIAGNOSIS — E89.0 POSTPROCEDURAL HYPOTHYROIDISM: Chronic | ICD-10-CM

## 2017-08-05 DIAGNOSIS — Z90.49 ACQUIRED ABSENCE OF OTHER SPECIFIED PARTS OF DIGESTIVE TRACT: Chronic | ICD-10-CM

## 2017-08-05 DIAGNOSIS — R06.02 SHORTNESS OF BREATH: ICD-10-CM

## 2017-08-05 DIAGNOSIS — Z95.1 PRESENCE OF AORTOCORONARY BYPASS GRAFT: Chronic | ICD-10-CM

## 2017-08-05 DIAGNOSIS — I48.91 UNSPECIFIED ATRIAL FIBRILLATION: ICD-10-CM

## 2017-08-05 LAB
ALBUMIN SERPL ELPH-MCNC: 4.4 G/DL — SIGNIFICANT CHANGE UP (ref 3.3–5)
ALP SERPL-CCNC: 63 U/L — SIGNIFICANT CHANGE UP (ref 40–120)
ALT FLD-CCNC: 38 U/L RC — SIGNIFICANT CHANGE UP (ref 10–45)
ANION GAP SERPL CALC-SCNC: 15 MMOL/L — SIGNIFICANT CHANGE UP (ref 5–17)
APTT BLD: 33 SEC — SIGNIFICANT CHANGE UP (ref 27.5–37.4)
AST SERPL-CCNC: 29 U/L — SIGNIFICANT CHANGE UP (ref 10–40)
BASOPHILS # BLD AUTO: 0 K/UL — SIGNIFICANT CHANGE UP (ref 0–0.2)
BASOPHILS NFR BLD AUTO: 0.6 % — SIGNIFICANT CHANGE UP (ref 0–2)
BILIRUB SERPL-MCNC: 0.5 MG/DL — SIGNIFICANT CHANGE UP (ref 0.2–1.2)
BUN SERPL-MCNC: 28 MG/DL — HIGH (ref 7–23)
CALCIUM SERPL-MCNC: 9.2 MG/DL — SIGNIFICANT CHANGE UP (ref 8.4–10.5)
CHLORIDE SERPL-SCNC: 98 MMOL/L — SIGNIFICANT CHANGE UP (ref 96–108)
CO2 SERPL-SCNC: 22 MMOL/L — SIGNIFICANT CHANGE UP (ref 22–31)
CREAT SERPL-MCNC: 1.46 MG/DL — HIGH (ref 0.5–1.3)
EOSINOPHIL # BLD AUTO: 0.3 K/UL — SIGNIFICANT CHANGE UP (ref 0–0.5)
EOSINOPHIL NFR BLD AUTO: 3.5 % — SIGNIFICANT CHANGE UP (ref 0–6)
GAS PNL BLDV: SIGNIFICANT CHANGE UP
GLUCOSE SERPL-MCNC: 203 MG/DL — HIGH (ref 70–99)
HCT VFR BLD CALC: 43.9 % — SIGNIFICANT CHANGE UP (ref 34.5–45)
HGB BLD-MCNC: 14 G/DL — SIGNIFICANT CHANGE UP (ref 11.5–15.5)
INR BLD: 0.95 RATIO — SIGNIFICANT CHANGE UP (ref 0.88–1.16)
LYMPHOCYTES # BLD AUTO: 1.3 K/UL — SIGNIFICANT CHANGE UP (ref 1–3.3)
LYMPHOCYTES # BLD AUTO: 16.4 % — SIGNIFICANT CHANGE UP (ref 13–44)
MAGNESIUM SERPL-MCNC: 2.1 MG/DL — SIGNIFICANT CHANGE UP (ref 1.6–2.6)
MCHC RBC-ENTMCNC: 27.7 PG — SIGNIFICANT CHANGE UP (ref 27–34)
MCHC RBC-ENTMCNC: 31.9 GM/DL — LOW (ref 32–36)
MCV RBC AUTO: 86.9 FL — SIGNIFICANT CHANGE UP (ref 80–100)
MONOCYTES # BLD AUTO: 0.5 K/UL — SIGNIFICANT CHANGE UP (ref 0–0.9)
MONOCYTES NFR BLD AUTO: 6.9 % — SIGNIFICANT CHANGE UP (ref 2–14)
NEUTROPHILS # BLD AUTO: 5.7 K/UL — SIGNIFICANT CHANGE UP (ref 1.8–7.4)
NEUTROPHILS NFR BLD AUTO: 72.6 % — SIGNIFICANT CHANGE UP (ref 43–77)
PHOSPHATE SERPL-MCNC: 2.9 MG/DL — SIGNIFICANT CHANGE UP (ref 2.5–4.5)
PLATELET # BLD AUTO: 173 K/UL — SIGNIFICANT CHANGE UP (ref 150–400)
POTASSIUM SERPL-MCNC: 4.7 MMOL/L — SIGNIFICANT CHANGE UP (ref 3.5–5.3)
POTASSIUM SERPL-SCNC: 4.7 MMOL/L — SIGNIFICANT CHANGE UP (ref 3.5–5.3)
PROT SERPL-MCNC: 7.7 G/DL — SIGNIFICANT CHANGE UP (ref 6–8.3)
PROTHROM AB SERPL-ACNC: 10.3 SEC — SIGNIFICANT CHANGE UP (ref 9.8–12.7)
RBC # BLD: 5.05 M/UL — SIGNIFICANT CHANGE UP (ref 3.8–5.2)
RBC # FLD: 13 % — SIGNIFICANT CHANGE UP (ref 10.3–14.5)
SODIUM SERPL-SCNC: 135 MMOL/L — SIGNIFICANT CHANGE UP (ref 135–145)
TROPONIN T SERPL-MCNC: 0.01 NG/ML — SIGNIFICANT CHANGE UP (ref 0–0.06)
TROPONIN T SERPL-MCNC: <0.01 NG/ML — SIGNIFICANT CHANGE UP (ref 0–0.06)
TROPONIN T SERPL-MCNC: <0.01 NG/ML — SIGNIFICANT CHANGE UP (ref 0–0.06)
WBC # BLD: 7.9 K/UL — SIGNIFICANT CHANGE UP (ref 3.8–10.5)
WBC # FLD AUTO: 7.9 K/UL — SIGNIFICANT CHANGE UP (ref 3.8–10.5)

## 2017-08-05 PROCEDURE — 71010: CPT | Mod: 26

## 2017-08-05 PROCEDURE — 93010 ELECTROCARDIOGRAM REPORT: CPT

## 2017-08-05 PROCEDURE — 99285 EMERGENCY DEPT VISIT HI MDM: CPT | Mod: 25,GC

## 2017-08-05 RX ORDER — DEXTROSE 50 % IN WATER 50 %
1 SYRINGE (ML) INTRAVENOUS ONCE
Refills: 0 | Status: DISCONTINUED | OUTPATIENT
Start: 2017-08-05 | End: 2017-08-08

## 2017-08-05 RX ORDER — FUROSEMIDE 40 MG
40 TABLET ORAL DAILY
Refills: 0 | Status: DISCONTINUED | OUTPATIENT
Start: 2017-08-05 | End: 2017-08-08

## 2017-08-05 RX ORDER — INSULIN DETEMIR 100/ML (3)
40 INSULIN PEN (ML) SUBCUTANEOUS AT BEDTIME
Refills: 0 | Status: DISCONTINUED | OUTPATIENT
Start: 2017-08-05 | End: 2017-08-05

## 2017-08-05 RX ORDER — METOPROLOL TARTRATE 50 MG
75 TABLET ORAL
Refills: 0 | Status: DISCONTINUED | OUTPATIENT
Start: 2017-08-05 | End: 2017-08-06

## 2017-08-05 RX ORDER — INSULIN GLARGINE 100 [IU]/ML
40 INJECTION, SOLUTION SUBCUTANEOUS AT BEDTIME
Refills: 0 | Status: DISCONTINUED | OUTPATIENT
Start: 2017-08-05 | End: 2017-08-08

## 2017-08-05 RX ORDER — INSULIN LISPRO 100/ML
4 VIAL (ML) SUBCUTANEOUS
Refills: 0 | Status: DISCONTINUED | OUTPATIENT
Start: 2017-08-05 | End: 2017-08-07

## 2017-08-05 RX ORDER — ALLOPURINOL 300 MG
100 TABLET ORAL DAILY
Refills: 0 | Status: DISCONTINUED | OUTPATIENT
Start: 2017-08-05 | End: 2017-08-08

## 2017-08-05 RX ORDER — LISINOPRIL 2.5 MG/1
5 TABLET ORAL DAILY
Refills: 0 | Status: DISCONTINUED | OUTPATIENT
Start: 2017-08-05 | End: 2017-08-08

## 2017-08-05 RX ORDER — DEXTROSE 50 % IN WATER 50 %
25 SYRINGE (ML) INTRAVENOUS ONCE
Refills: 0 | Status: DISCONTINUED | OUTPATIENT
Start: 2017-08-05 | End: 2017-08-08

## 2017-08-05 RX ORDER — ENOXAPARIN SODIUM 100 MG/ML
80 INJECTION SUBCUTANEOUS
Refills: 0 | Status: DISCONTINUED | OUTPATIENT
Start: 2017-08-05 | End: 2017-08-06

## 2017-08-05 RX ORDER — CLOPIDOGREL BISULFATE 75 MG/1
75 TABLET, FILM COATED ORAL DAILY
Refills: 0 | Status: DISCONTINUED | OUTPATIENT
Start: 2017-08-05 | End: 2017-08-08

## 2017-08-05 RX ORDER — DEXTROSE 50 % IN WATER 50 %
12.5 SYRINGE (ML) INTRAVENOUS ONCE
Refills: 0 | Status: DISCONTINUED | OUTPATIENT
Start: 2017-08-05 | End: 2017-08-08

## 2017-08-05 RX ORDER — GLUCAGON INJECTION, SOLUTION 0.5 MG/.1ML
1 INJECTION, SOLUTION SUBCUTANEOUS ONCE
Refills: 0 | Status: DISCONTINUED | OUTPATIENT
Start: 2017-08-05 | End: 2017-08-08

## 2017-08-05 RX ORDER — ASPIRIN/CALCIUM CARB/MAGNESIUM 324 MG
81 TABLET ORAL DAILY
Refills: 0 | Status: DISCONTINUED | OUTPATIENT
Start: 2017-08-05 | End: 2017-08-08

## 2017-08-05 RX ORDER — METOPROLOL TARTRATE 50 MG
100 TABLET ORAL
Refills: 0 | Status: DISCONTINUED | OUTPATIENT
Start: 2017-08-05 | End: 2017-08-05

## 2017-08-05 RX ORDER — ZOLPIDEM TARTRATE 10 MG/1
5 TABLET ORAL AT BEDTIME
Refills: 0 | Status: DISCONTINUED | OUTPATIENT
Start: 2017-08-05 | End: 2017-08-08

## 2017-08-05 RX ORDER — SPIRONOLACTONE 25 MG/1
25 TABLET, FILM COATED ORAL DAILY
Refills: 0 | Status: DISCONTINUED | OUTPATIENT
Start: 2017-08-05 | End: 2017-08-08

## 2017-08-05 RX ORDER — LEVOTHYROXINE SODIUM 125 MCG
100 TABLET ORAL DAILY
Refills: 0 | Status: DISCONTINUED | OUTPATIENT
Start: 2017-08-05 | End: 2017-08-08

## 2017-08-05 RX ORDER — INSULIN LISPRO 100/ML
VIAL (ML) SUBCUTANEOUS
Refills: 0 | Status: DISCONTINUED | OUTPATIENT
Start: 2017-08-05 | End: 2017-08-08

## 2017-08-05 RX ORDER — ACETAMINOPHEN 500 MG
650 TABLET ORAL EVERY 6 HOURS
Refills: 0 | Status: DISCONTINUED | OUTPATIENT
Start: 2017-08-05 | End: 2017-08-08

## 2017-08-05 RX ORDER — ATORVASTATIN CALCIUM 80 MG/1
80 TABLET, FILM COATED ORAL AT BEDTIME
Refills: 0 | Status: DISCONTINUED | OUTPATIENT
Start: 2017-08-05 | End: 2017-08-08

## 2017-08-05 RX ORDER — SODIUM CHLORIDE 9 MG/ML
1000 INJECTION, SOLUTION INTRAVENOUS
Refills: 0 | Status: DISCONTINUED | OUTPATIENT
Start: 2017-08-05 | End: 2017-08-08

## 2017-08-05 RX ORDER — ACETAMINOPHEN 500 MG
650 TABLET ORAL EVERY 6 HOURS
Refills: 0 | Status: DISCONTINUED | OUTPATIENT
Start: 2017-08-05 | End: 2017-08-05

## 2017-08-05 RX ADMIN — ENOXAPARIN SODIUM 80 MILLIGRAM(S): 100 INJECTION SUBCUTANEOUS at 22:06

## 2017-08-05 RX ADMIN — Medication 75 MILLIGRAM(S): at 22:06

## 2017-08-05 RX ADMIN — Medication 4: at 22:06

## 2017-08-05 RX ADMIN — Medication 100 MILLIGRAM(S): at 22:07

## 2017-08-05 RX ADMIN — ATORVASTATIN CALCIUM 80 MILLIGRAM(S): 80 TABLET, FILM COATED ORAL at 22:09

## 2017-08-05 RX ADMIN — ZOLPIDEM TARTRATE 5 MILLIGRAM(S): 10 TABLET ORAL at 22:06

## 2017-08-05 RX ADMIN — Medication 650 MILLIGRAM(S): at 13:03

## 2017-08-05 RX ADMIN — INSULIN GLARGINE 40 UNIT(S): 100 INJECTION, SOLUTION SUBCUTANEOUS at 22:42

## 2017-08-05 NOTE — H&P ADULT - ASSESSMENT
PT  WITH  CP/ SOB, AFTER  TAKING BRILINTA    DC BRILINTA    START  PLAVIX   AFIB, NEW.  NEEDS  A/ C.  ECHO

## 2017-08-05 NOTE — CONSULT NOTE ADULT - ASSESSMENT
82 year old woman PMH CKD III, HFpEF 68% in 2016, Hypothyroidism CAD s/p CABG, s/p 25 stents and brachytherapy, last PCI 8/2/17, AV and MV repair remote (first occurence A. fib not on systemic AC), DM2 on Insulin c/b by retinopathy, TIA no residual deficits presents w/ SOB/CP found newly started on ticagrelor (brilinta) and recurrent pAF (first occurence with valvular repairs).

## 2017-08-05 NOTE — ED ADULT NURSE REASSESSMENT NOTE - NS ED NURSE REASSESS COMMENT FT1
Patients HR irregular and remaining in 50s MD Thompson aware- states no need for pacer pads at this point. Remains on monitor- will continue to watch patient.

## 2017-08-05 NOTE — ED PROVIDER NOTE - PHYSICAL EXAMINATION
GENERAL: AOx3, NAD, appears stated age   HEENT: pupils equal & reactive, no scleral icterus  NECK: supple, trachea midline  CARDIAC: S1 & S2 present, no murmurs, no JVD +irregular rhythm  CHEST: breath sounds equal B/L, no wheeze +mild bibasilar rales  ABDOMEN: ND, bowel sounds present, soft, NTTP  NEURO: CN II-XII grossly intact, no focal deficits  MSKl: Strength appropriate in all extremities for age, ROM normal  SKIN: Warm, no diffuse rashes +mildly diaphoretic, pale complexion  PSYCH: Speech fluid, appropriate mood and affect  EXT: No LE edema, pedal pulses 2+ and equal B/L, capillary refill <2 s

## 2017-08-05 NOTE — ED PROVIDER NOTE - MEDICAL DECISION MAKING DETAILS
Unstable angina w/ extensive cardiac history, recent cardiac cath w/ PCI must consider ACS vs. stent-restenosis vs. CHF exacerbation, check CBC, CMP, VBG, pro-BNP, trend troponin, chest x-ray then reassess 83 y/o F pt with PMHx of CAD s/p aortic valve replacement, multiple stents, recent cardiac cath w/ PCI came in with SOB and chest tightness. 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 Will consider ACS vs. stent-restenosis vs. CHF exacerbation, check CBC, CMP, VBG, pro-BNP, trend troponin, chest x-ray then reassess. ZR

## 2017-08-05 NOTE — ED PROVIDER NOTE - OBJECTIVE STATEMENT
82F with CAD s/p CABG, s/p CAD x 25 stents, MV + AV replacement, DM2, CKDIII presents for non-exertional CP a/w SOB. CP left-sided, started 30 mins after pt took Brillinta, increased w/ deep inspiration 82F with CAD s/p CABG, s/p CAD x 25 stents, s/p AV and MV repair, DM2 on insulin, CKDIII presents for non-exertional CP a/w SOB. CP left-sided, began 30 mins after pt took Brillinta earlier this morning, increased w/ deep inspiration, no association w/ N/V, diaphoresis, warmth. Pt underwent cardiac catheterization and PCI w/ Pt also feels SOB, struggling to catch her breath, has had lower leg swelling in the past but none at this time. She took Lasix 40 x1 today. 82F with CAD s/p CABG, s/p CAD x 25 stents, s/p AV and MV repair, DM2 on insulin, CKDIII presents for non-exertional CP a/w SOB. CP left-sided, began 30 mins after pt took Brillinta earlier this morning, increased w/ deep inspiration, no association w/ N/V, diaphoresis, warmth. Pt underwent cardiac catheterization and PCI w/ ERICA to OM1 on 08/02. Pt also feels SOB, struggling to catch her breath, has had lower leg swelling in the past but none at this time. She took Lasix 40 x1 today and notified her Cardiology group.    PMD:  Cardiologist: Dr. Lam Painting 82F with CAD s/p CABG, s/p CAD x 25 stents, s/p AV and MV repair, DM2 on insulin, CKDIII presents for non-exertional CP a/w SOB. CP left-sided, began 30 mins after pt took Brillinta earlier this morning, increased w/ deep inspiration, no association w/ N/V, diaphoresis, warmth. Most recent cardiac cath/PCI three days ago on 08/02 w/ balloon angioplasty and ERICA to OM1. Pt also feels more SOB since this morning, struggling to catch her breath. She has had lower leg swelling in the past but none at present. She took Lasix 40 x1 today and notified her Cardiologist of chest pain.     PMD/Cardiologist: Dr. Lam Painting

## 2017-08-05 NOTE — H&P ADULT - NSHPREVIEWOFSYSTEMS_GEN_ALL_CORE
REVIEW OF SYSTEMS:    CONSTITUTIONAL: No weakness, fevers or chills  EYES/ENT: No visual changes;  No vertigo or throat pain   NECK: No pain or stiffness  RESPIRATORY: No cough, wheezing, hemoptysis; had   shortness of breath  CARDIOVASCULAR: No chest pain or palpitations  GASTROINTESTINAL: No abdominal or epigastric pain. No nausea, vomiting, or hematemesis; No diarrhea or constipation. No melena or hematochezia.  GENITOURINARY: No dysuria, frequency or hematuria  NEUROLOGICAL: No numbness or weakness  SKIN: No itching, rashes

## 2017-08-05 NOTE — ED ADULT NURSE REASSESSMENT NOTE - NS ED NURSE REASSESS COMMENT FT1
TOMMY room called- patient changed from trigeminy to afib with longer pauses. MD Morley given phone and made aware. Patient remains on cardiac monitor.

## 2017-08-05 NOTE — CONSULT NOTE ADULT - SUBJECTIVE AND OBJECTIVE BOX
Patient seen and evaluated @ [facility] ED MW 19  Chief Complaint: SOB/CP    HPI:    Patient explains that was told had extra beats on EKG when saw cardiologist on Friday and was placed on a holter monitor. Today, noticed SOB after starting Brilinta. SOB now resolved. Additionally describes chest pain not associated w/ exertion that are distinct and different from her anginal equivalent which has now also resolved.    PMH:   CKD (chronic kidney disease) stage 3, GFR 30-59 ml/min  Chronic heart failure, unspecified heart failure type  Gout  Valvular heart disease  Hypothyroid  Coronary artery disease  Cardiac anomaly  HTN (hypertension)  Diabetes    PSH:   H/O mitral valve repair  H/O aortic valve repair  S/P CABG x 3  H/O thyroidectomy  History of cholecystectomy    Medications:   acetaminophen   Tablet. 650 milliGRAM(s) Oral every 6 hours    home meds:  allopurinol 100  asa 81  atorva 80  plavix -> brilinta s/p load outpatient  lasix 40 PO daily  glimeperide  levemir  humalog   levothyroxine  metoprolol tartrate 100 bid    Allergies:  Lidocaine Hydrochloride (Other)    FAMILY HISTORY: noncontributory    Social History: noncontributory    Review of Systems: see hpi  Constitutional: [ ] Fever [ ] Chills [ ] Fatigue [ ] Weight change   HEENT: [ ] Blurred vision [ ] Eye Pain [ ] Headache [ ] Runny nose [ ] Sore Throat   Respiratory: [ ] Cough [ ] Wheezing [ ] Shortness of breath  Cardiovascular: [ ] Chest Pain [ ] Palpitations [ ] YOUNG [ ] PND [ ] Orthopnea  Gastrointestinal: [ ] Abdominal Pain [ ] Diarrhea [ ] Constipation [ ] Hemorrhoids [ ] Nausea [ ] Vomiting  Genitourinary: [ ] Nocturia [ ] Dysuria [ ] Incontinence  Extremities: [ ] Swelling [ ] Joint Pain  Neurologic: [ ] Focal deficit [ ] Paresthesias [ ] Syncope  Lymphatic: [ ] Swelling [ ] Lymphadenopathy   Skin: [ ] Rash [ ] Ecchymoses [ ] Wounds [ ] Lesions  Psychiatry: [ ] Depression [ ] Suicidal/Homicidal Ideation [ ] Anxiety [ ] Sleep Disturbances  [ ] 10 point review of systems is otherwise negative except as mentioned above            [ ]Unable to obtain    Physical Exam:  T(C): 36.6 (08-05-17 @ 14:50), Max: 36.7 (08-05-17 @ 10:56)  HR: 54 (08-05-17 @ 14:50) (41 - 54)  BP: 104/60 (08-05-17 @ 14:50) (104/60 - 128/62)  RR: 18 (08-05-17 @ 14:50) (18 - 18)  SpO2: 100% (08-05-17 @ 14:50) (99% - 100%)  Wt(kg): --    Daily     Daily     Appearance: [x ] Normal [x ] NAD  Eyes: [ x] PERRL [x ] EOMI  HENT: [ x] Normal oral mus[place]a [ ]NC/AT  Cardiovascular: [ x] S1 [x] S2, irreg irreg, [x ] No m/r/g [ x]No edema [x ] no JVP  Respiratory: [ x] Clear to auscultation bilaterally  Gastrointestinal: [x ] Soft x[ ] Non-tender [ x] Non-distended [x ] BS+  Musculoskeletal: [ x] No clubbing [x ] No joint deformity   Neurologic: [x ] Non-focal  Lymphatic: [x ] No lymphadenopathy  Psychiatry: [x ] AAOx3 x[ ] Mood & affect appropriate  Skin: [ x] No rashes [x ] No ecchymoses [x ] No cyanosis    Cardiovascular Diagnostic Testing:  ECG: a fib 52, nonspecific T wave, normal axis, normal intervals - unchanged from 8/2/17    < from: Cardiac Cath Lab - Adult (08.02.17 @ 16:41) >  PROCEDURE:  --  Left coronary angiography.  --  Right coronary angiography.  --  Bypass graft angiography.  --  Sheath Exchange for Intervention.  --  Intervention on OM1: drug-eluting stent.    < end of copied text >  < from: Cardiac Cath Lab - Adult (08.02.17 @ 16:41) >  CORONARY VESSELS: The coronary circulation is right dominant.  LM:   --  LM: Angiography showed minor luminal irregularities with no flow  limiting lesions.  LAD:   --  Mid LAD: There was a 100 % stenosis.  CX:   --  OM1: There was a 99 % stenosis.  RCA:   --  RCA: Angiography showed minor luminal irregularities with no  flow limiting lesions.  --  Proximal RCA: There was a 10 % stenosis at the site of a prior stent.  --  Distal RCA: There was a 10 % stenosis at the site of a prior stent.  GRAFTS:   --  Graft to the mid LAD: The graft was a LIMA. Graft angiography  showed minor luminal irregularities.  COMPLICATIONS: There were no complications. No complications occurred  during the cath lab visit.  DIAGNOSTIC RECOMMENDATIONS: ASA and Plavix for 1 year.    < end of copied text >      Interpretation of Telemetry: sinus -> pAF rate range 47-70s, a fib pause 3.4 sec, freq PVCs; currently atrial fibrillation 60s    Imaging:  < from: Xray Chest 1 View AP- PORTABLE-Urgent (08.05.17 @ 11:50) >  INTERPRETATION:  AP chest with comparison to 8/2/2017.    Clinical indications: Shortness of breath. Chest pain.    IMPRESSION: The heart is normal in size. The patient is status post   median sternotomy. The lungs are clear.    < end of copied text >      Labs:                        14.0   7.9   )-----------( 173      ( 05 Aug 2017 11:43 )             43.9     08-05    135  |  98  |  28<H>  ----------------------------<  203<H>  4.7   |  22  |  1.46<H>    Ca    9.2      05 Aug 2017 11:42  Phos  2.9     08-05  Mg     2.1     08-05    TPro  7.7  /  Alb  4.4  /  TBili  0.5  /  DBili  x   /  AST  29  /  ALT  38  /  AlkPhos  63  08-05    PT/INR - ( 05 Aug 2017 11:43 )   PT: 10.3 sec;   INR: 0.95 ratio         PTT - ( 05 Aug 2017 11:43 )  PTT:33.0 sec  CARDIAC MARKERS ( 05 Aug 2017 11:42 )  x     / <0.01 ng/mL / x     / x     / x          Serum Pro-Brain Natriuretic Peptide: 446 pg/mL (08-05 @ 11:42)  Serum Pro-Brain Natriuretic Peptide: 400 pg/mL (08-02 @ 15:01) Patient seen and evaluated @ [facility] ED MW 19  Chief Complaint: SOB/CP    HPI:  82 year old woman PMH CKD III, HFpEF 68% in 2016, Hypothyroidism CAD s/p CABG, s/p 25 stents and brachytherapy, last PCI 8/2/17, AV and MV repair remote (first occurence A. fib not on systemic AC), DM2 on Insulin c/b by retinopathy, TIA no residual deficits presents w/ SOB/CP.    Patient explains that was told had extra beats on EKG when saw cardiologist on Friday and was placed on a holter monitor. Today, noticed SOB after starting Brilinta. SOB now resolved. Additionally describes chest pain not associated w/ exertion that are distinct and different from her anginal equivalent which has now also resolved.    PMH:   CKD (chronic kidney disease) stage 3, GFR 30-59 ml/min  Chronic heart failure, unspecified heart failure type  Gout  Valvular heart disease  Hypothyroid  Coronary artery disease  Cardiac anomaly  HTN (hypertension)  Diabetes    PSH:   H/O mitral valve repair  H/O aortic valve repair  S/P CABG x 3  H/O thyroidectomy  History of cholecystectomy    Medications:   acetaminophen   Tablet. 650 milliGRAM(s) Oral every 6 hours    home meds:  allopurinol 100  asa 81  atorva 80  plavix -> brilinta s/p load outpatient  lasix 40 PO daily  glimeperide  levemir  humalog   levothyroxine  metoprolol tartrate 100 bid    Allergies:  Lidocaine Hydrochloride (Other)    FAMILY HISTORY: noncontributory    Social History: noncontributory    Review of Systems: see hpi  Constitutional: [ ] Fever [ ] Chills [ ] Fatigue [ ] Weight change   HEENT: [ ] Blurred vision [ ] Eye Pain [ ] Headache [ ] Runny nose [ ] Sore Throat   Respiratory: [ ] Cough [ ] Wheezing [ ] Shortness of breath  Cardiovascular: [ ] Chest Pain [ ] Palpitations [ ] YOUNG [ ] PND [ ] Orthopnea  Gastrointestinal: [ ] Abdominal Pain [ ] Diarrhea [ ] Constipation [ ] Hemorrhoids [ ] Nausea [ ] Vomiting  Genitourinary: [ ] Nocturia [ ] Dysuria [ ] Incontinence  Extremities: [ ] Swelling [ ] Joint Pain  Neurologic: [ ] Focal deficit [ ] Paresthesias [ ] Syncope  Lymphatic: [ ] Swelling [ ] Lymphadenopathy   Skin: [ ] Rash [ ] Ecchymoses [ ] Wounds [ ] Lesions  Psychiatry: [ ] Depression [ ] Suicidal/Homicidal Ideation [ ] Anxiety [ ] Sleep Disturbances  [ ] 10 point review of systems is otherwise negative except as mentioned above            [ ]Unable to obtain    Physical Exam:  T(C): 36.6 (08-05-17 @ 14:50), Max: 36.7 (08-05-17 @ 10:56)  HR: 54 (08-05-17 @ 14:50) (41 - 54)  BP: 104/60 (08-05-17 @ 14:50) (104/60 - 128/62)  RR: 18 (08-05-17 @ 14:50) (18 - 18)  SpO2: 100% (08-05-17 @ 14:50) (99% - 100%)  Wt(kg): --    Daily     Daily     Appearance: [x ] Normal [x ] NAD  Eyes: [ x] PERRL [x ] EOMI  HENT: [ x] Normal oral muscosa [ ]NC/AT  Cardiovascular: [ x] S1 [x] S2, irreg irreg, [x ] No m/r/g [ x]No edema [x ] no JVP  Respiratory: [ x] Clear to auscultation bilaterally  Gastrointestinal: [x ] Soft x[ ] Non-tender [ x] Non-distended [x ] BS+  Musculoskeletal: [ x] No clubbing [x ] No joint deformity   Neurologic: [x ] Non-focal  Lymphatic: [x ] No lymphadenopathy  Psychiatry: [x ] AAOx3 x[ ] Mood & affect appropriate  Skin: [ x] No rashes [x ] No ecchymoses [x ] No cyanosis    Cardiovascular Diagnostic Testing:  ECG: a fib 52, nonspecific T wave, normal axis, normal intervals - unchanged from 8/2/17    < from: Cardiac Cath Lab - Adult (08.02.17 @ 16:41) >  PROCEDURE:  --  Left coronary angiography.  --  Right coronary angiography.  --  Bypass graft angiography.  --  Sheath Exchange for Intervention.  --  Intervention on OM1: drug-eluting stent.    < end of copied text >  < from: Cardiac Cath Lab - Adult (08.02.17 @ 16:41) >  CORONARY VESSELS: The coronary circulation is right dominant.  LM:   --  LM: Angiography showed minor luminal irregularities with no flow  limiting lesions.  LAD:   --  Mid LAD: There was a 100 % stenosis.  CX:   --  OM1: There was a 99 % stenosis.  RCA:   --  RCA: Angiography showed minor luminal irregularities with no  flow limiting lesions.  --  Proximal RCA: There was a 10 % stenosis at the site of a prior stent.  --  Distal RCA: There was a 10 % stenosis at the site of a prior stent.  GRAFTS:   --  Graft to the mid LAD: The graft was a LIMA. Graft angiography  showed minor luminal irregularities.  COMPLICATIONS: There were no complications. No complications occurred  during the cath lab visit.  DIAGNOSTIC RECOMMENDATIONS: ASA and Plavix for 1 year.    < end of copied text >      Interpretation of Telemetry: sinus -> pAF rate range 47-70s, a fib pause 3.4 sec, freq PVCs; currently atrial fibrillation 60s    Imaging:  < from: Xray Chest 1 View AP- PORTABLE-Urgent (08.05.17 @ 11:50) >  INTERPRETATION:  AP chest with comparison to 8/2/2017.    Clinical indications: Shortness of breath. Chest pain.    IMPRESSION: The heart is normal in size. The patient is status post   median sternotomy. The lungs are clear.    < end of copied text >      Labs:                        14.0   7.9   )-----------( 173      ( 05 Aug 2017 11:43 )             43.9     08-05    135  |  98  |  28<H>  ----------------------------<  203<H>  4.7   |  22  |  1.46<H>    Ca    9.2      05 Aug 2017 11:42  Phos  2.9     08-05  Mg     2.1     08-05    TPro  7.7  /  Alb  4.4  /  TBili  0.5  /  DBili  x   /  AST  29  /  ALT  38  /  AlkPhos  63  08-05    PT/INR - ( 05 Aug 2017 11:43 )   PT: 10.3 sec;   INR: 0.95 ratio         PTT - ( 05 Aug 2017 11:43 )  PTT:33.0 sec  CARDIAC MARKERS ( 05 Aug 2017 11:42 )  x     / <0.01 ng/mL / x     / x     / x          Serum Pro-Brain Natriuretic Peptide: 446 pg/mL (08-05 @ 11:42)  Serum Pro-Brain Natriuretic Peptide: 400 pg/mL (08-02 @ 15:01)

## 2017-08-05 NOTE — H&P ADULT - HISTORY OF PRESENT ILLNESS
82F with CAD s/p CABG, s/p CAD x 25 stents, s/p AV and MV repair, DM2 on insulin, CKDIII presents for non-exertional CP a/w SOB. CP left-sided, began 30 mins after pt took Brillinta earlier this morning, increased w/ deep inspiration, no association w/ N/V, diaphoresis, warmth. Most recent cardiac cath/PCI three days ago on 08/02 w/ balloon angioplasty and ERICA to OM1. Pt also feels more SOB since this morning, struggling to catch her breath. She has had lower leg swelling in the past but none at present. She took Lasix 40 x1 today and notified her Cardiologist of chest pain.   PMD/Cardiologist: Dr. Lam Painting

## 2017-08-05 NOTE — ED ADULT NURSE NOTE - OBJECTIVE STATEMENT
82F comes to ED c/o SOB since 0730 this AM. Patient had cardiac stents placed and angioplasty on Weds. She saw her MD Friday who placed her on holter monitor for "extra beats" and told her to change from Plavix to brilinta. She took her first dose of brilinta today at 7AM and states she felt SOB after that. She also is c/o intermittent left sided chest pain. Chest pain does not radiate. She is a&ox3. She is able to complete full sentences, no accessory muscle use. She states "I feel like I just ran a marathon. I don't feel right". Patient placed on o2 for comfort. She denies abdominal pain/palpitations/fever/chills/N/V/D/edema/palpitations/urinary symptoms/inspiratory chest pain. She has PMH positional vertigo worse today, 25 cardiac stents, CHF and DM managed with insulin. On exam, neurologically intact, lungs clear, no edema, abdomen soft and nontender. Patient took 81mg ASA today. EKG performed immediately. Patient placed on cardiac monitor. Will continue to monitor. Family at bedside. Bedrails up for patient safety. 82F comes to ED c/o SOB since 0730 this AM. Patient had cardiac stents placed and angioplasty on Weds. She saw her MD Friday who placed her on holter monitor for "extra beats" and told her to change from Plavix to brilinta. She took her first dose of brilinta today at 7AM and states she felt SOB after that. She also is c/o intermittent left sided chest pain. Chest pain does not radiate. She is a&ox3. She is able to complete full sentences, no accessory muscle use. She states "I feel like I just ran a marathon. I don't feel right". Patient placed on o2 for comfort. She denies abdominal pain/palpitations/fever/chills/N/V/D/edema/palpitations/urinary symptoms/inspiratory chest pain/throat tightness/rash. She has PMH positional vertigo worse today, 25 cardiac stents, CHF and DM managed with insulin. On exam, neurologically intact, lungs clear, no edema, abdomen soft and nontender. Patient took 81mg ASA today. EKG performed immediately. Patient placed on cardiac monitor. Will continue to monitor. Family at bedside. Bedrails up for patient safety.

## 2017-08-05 NOTE — ED PROVIDER NOTE - PROGRESS NOTE DETAILS
Spoke w/ Dr. Omero Marion who advises patient be admitted to Dr. Praneeth Urbano/[facility] given recent PCI w/ ERICA placement 3 days ago. Conversion to Afib w/ bradycardia to 40s on telemetry and EKG. Spoke w/ Dr. Omero Marion who advises patient be admitted to Dr. Praneeth Urbano/[facility] given recent PCI w/ ERICA placement 3 days ago. [facility] will not accept patient to CCU. Spoke w/ Dr. Dennison however their group ([facility]) do not admit patients to NS, advised that patient be started on Pradaxa 75 BID for Afib, half dose of beta blocker and follow up outpatient however given patient hypotension and Afib w/ slow ventricular response, will admit patient to Dr. Kaitlin Ortiz for further monitoring on telemetry and r/o ACS. [facility] will not accept patient to CCU. Spoke w/ Dr. Dennison however their group ([facility]) do not admit patients to NS, advised that patient be started on Pradaxa 75 BID for Afib, half dose of beta blocker and follow up outpatient however given patient hypotension and Afib w/ slow ventricular response, will admit patient to Dr. Kaitlin Ortiz for further monitoring on telemetry and r/o ACS.ZR

## 2017-08-05 NOTE — CONSULT NOTE ADULT - PROBLEM SELECTOR RECOMMENDATION 9
-hemodynamically stable, presenting sx resolved, not a candidate for CCU at this time  -possibly from a fib or brilinta  -please discuss further changes with Dr Painting and his group  -consider converting back to plavix  -consider decreasing metoprolol  -consider converting to sinus if does not self convert  -repeat TTE unless recent outpatient study known to Dr Painting     [id number]  attempted to call Dr Vuong on call for Dr Painting ([phone]), ER will attempt recontact -hemodynamically stable, presenting sx resolved, not a candidate for CCU at this time  -possibly from a fib or brilinta  -please discuss further changes with Dr Painting and his group  -consider converting back to plavix  -consider decreasing metoprolol  -consider converting to sinus if does not self convert  -repeat TTE unless recent outpatient study known to Dr Painting   -[facility] 5 - defer systemic AC to Dr Painting, although even more reason to consider plavix over ticagrelor     [id number]  attempted to call Dr Vuong on call for Dr Painting ([phone]), ER will attempt recontact

## 2017-08-05 NOTE — ED PROVIDER NOTE - NS ED ROS FT
CONST: no fevers, no chills  EYES: no pain  ENT: no sore throat +dizziness   CV: no palpitations, no lower leg swelling +chest pain  RESP: no cough, +shortness of breath  ABD: no abdominal pain, nausea, vomiting   : no dysuria, urinary frequency  MSK: no back pain  NEURO: no headache or additional neurologic complaints  HEME: no easy bleeding  SKIN:  no rash

## 2017-08-05 NOTE — H&P ADULT - NSHPLABSRESULTS_GEN_ALL_CORE
LABS:                        14.0   7.9   )-----------( 173      ( 05 Aug 2017 11:43 )             43.9     08-05    135  |  98  |  28<H>  ----------------------------<  203<H>  4.7   |  22  |  1.46<H>    Ca    9.2      05 Aug 2017 11:42  Phos  2.9     08-05  Mg     2.1     08-05    TPro  7.7  /  Alb  4.4  /  TBili  0.5  /  DBili  x   /  AST  29  /  ALT  38  /  AlkPhos  63  08-05    PT/INR - ( 05 Aug 2017 11:43 )   PT: 10.3 sec;   INR: 0.95 ratio         PTT - ( 05 Aug 2017 11:43 )  PTT:33.0 sec        RADIOLOGY & ADDITIONAL TESTS:

## 2017-08-05 NOTE — H&P ADULT - NSHPPHYSICALEXAM_GEN_ALL_CORE
PHYSICAL EXAMINATION:  Vital Signs Last 24 Hrs  T(C): 36.8 (05 Aug 2017 18:20), Max: 36.8 (05 Aug 2017 18:20)  T(F): 98.2 (05 Aug 2017 18:20), Max: 98.2 (05 Aug 2017 18:20)  HR: 66 (05 Aug 2017 18:20) (41 - 66)  BP: 109/62 (05 Aug 2017 18:20) (104/60 - 128/62)  BP(mean): --  RR: 18 (05 Aug 2017 18:20) (18 - 18)  SpO2: 100% (05 Aug 2017 18:20) (99% - 100%)  CAPILLARY BLOOD GLUCOSE            GENERAL: NAD, well-groomed, well-developed  HEAD:  atraumatic, normocephalic  EYES: sclera anicteric  ENMT: mucous membranes moist  NECK: supple, No JVD  CHEST/LUNG: clear to auscultation bilaterally; no rales, rhonchi, or wheezing b/l  HEART: normal S1, S2  ABDOMEN: BS+, soft, ND, NT   EXTREMITIES:  pulses palpable; no clubbing, cyanosis, or edema b/l LEs  NEURO: awake, alert, interactive; moves all extremities  SKIN: no rashes or lesions

## 2017-08-05 NOTE — ED ADULT NURSE NOTE - PMH
Chronic heart failure, unspecified heart failure type    CKD (chronic kidney disease) stage 3, GFR 30-59 ml/min    Coronary artery disease    Diabetes    Gout    HTN (hypertension)    Hypothyroid    Valvular heart disease

## 2017-08-06 LAB
ANION GAP SERPL CALC-SCNC: 16 MMOL/L — SIGNIFICANT CHANGE UP (ref 5–17)
BUN SERPL-MCNC: 33 MG/DL — HIGH (ref 7–23)
CALCIUM SERPL-MCNC: 9.2 MG/DL — SIGNIFICANT CHANGE UP (ref 8.4–10.5)
CHLORIDE SERPL-SCNC: 101 MMOL/L — SIGNIFICANT CHANGE UP (ref 96–108)
CO2 SERPL-SCNC: 20 MMOL/L — LOW (ref 22–31)
CREAT SERPL-MCNC: 1.35 MG/DL — HIGH (ref 0.5–1.3)
GLUCOSE SERPL-MCNC: 145 MG/DL — HIGH (ref 70–99)
HBA1C BLD-MCNC: 9.7 % — HIGH (ref 4–5.6)
HCT VFR BLD CALC: 43.2 % — SIGNIFICANT CHANGE UP (ref 34.5–45)
HGB BLD-MCNC: 13.8 G/DL — SIGNIFICANT CHANGE UP (ref 11.5–15.5)
MCHC RBC-ENTMCNC: 26.9 PG — LOW (ref 27–34)
MCHC RBC-ENTMCNC: 31.9 GM/DL — LOW (ref 32–36)
MCV RBC AUTO: 84.2 FL — SIGNIFICANT CHANGE UP (ref 80–100)
PLATELET # BLD AUTO: 181 K/UL — SIGNIFICANT CHANGE UP (ref 150–400)
POTASSIUM SERPL-MCNC: 4.9 MMOL/L — SIGNIFICANT CHANGE UP (ref 3.5–5.3)
POTASSIUM SERPL-SCNC: 4.9 MMOL/L — SIGNIFICANT CHANGE UP (ref 3.5–5.3)
RBC # BLD: 5.13 M/UL — SIGNIFICANT CHANGE UP (ref 3.8–5.2)
RBC # FLD: 14.5 % — SIGNIFICANT CHANGE UP (ref 10.3–14.5)
SODIUM SERPL-SCNC: 137 MMOL/L — SIGNIFICANT CHANGE UP (ref 135–145)
WBC # BLD: 6.92 K/UL — SIGNIFICANT CHANGE UP (ref 3.8–10.5)
WBC # FLD AUTO: 6.92 K/UL — SIGNIFICANT CHANGE UP (ref 3.8–10.5)

## 2017-08-06 PROCEDURE — 99223 1ST HOSP IP/OBS HIGH 75: CPT

## 2017-08-06 RX ORDER — APIXABAN 2.5 MG/1
5 TABLET, FILM COATED ORAL EVERY 12 HOURS
Refills: 0 | Status: DISCONTINUED | OUTPATIENT
Start: 2017-08-06 | End: 2017-08-08

## 2017-08-06 RX ORDER — APIXABAN 2.5 MG/1
2.5 TABLET, FILM COATED ORAL EVERY 12 HOURS
Refills: 0 | Status: DISCONTINUED | OUTPATIENT
Start: 2017-08-06 | End: 2017-08-06

## 2017-08-06 RX ORDER — METOPROLOL TARTRATE 50 MG
50 TABLET ORAL
Refills: 0 | Status: DISCONTINUED | OUTPATIENT
Start: 2017-08-06 | End: 2017-08-07

## 2017-08-06 RX ADMIN — Medication 81 MILLIGRAM(S): at 12:03

## 2017-08-06 RX ADMIN — APIXABAN 5 MILLIGRAM(S): 2.5 TABLET, FILM COATED ORAL at 18:02

## 2017-08-06 RX ADMIN — Medication 100 MILLIGRAM(S): at 12:03

## 2017-08-06 RX ADMIN — CLOPIDOGREL BISULFATE 75 MILLIGRAM(S): 75 TABLET, FILM COATED ORAL at 12:03

## 2017-08-06 RX ADMIN — Medication 40 MILLIGRAM(S): at 05:52

## 2017-08-06 RX ADMIN — Medication 4 UNIT(S): at 18:02

## 2017-08-06 RX ADMIN — Medication 50 MILLIGRAM(S): at 18:02

## 2017-08-06 RX ADMIN — LISINOPRIL 5 MILLIGRAM(S): 2.5 TABLET ORAL at 05:52

## 2017-08-06 RX ADMIN — Medication 3: at 12:15

## 2017-08-06 RX ADMIN — ATORVASTATIN CALCIUM 80 MILLIGRAM(S): 80 TABLET, FILM COATED ORAL at 22:13

## 2017-08-06 RX ADMIN — Medication 100 MICROGRAM(S): at 05:52

## 2017-08-06 RX ADMIN — INSULIN GLARGINE 40 UNIT(S): 100 INJECTION, SOLUTION SUBCUTANEOUS at 22:13

## 2017-08-06 RX ADMIN — SPIRONOLACTONE 25 MILLIGRAM(S): 25 TABLET, FILM COATED ORAL at 09:19

## 2017-08-06 RX ADMIN — Medication 4 UNIT(S): at 09:20

## 2017-08-06 RX ADMIN — ENOXAPARIN SODIUM 80 MILLIGRAM(S): 100 INJECTION SUBCUTANEOUS at 05:51

## 2017-08-06 RX ADMIN — ZOLPIDEM TARTRATE 5 MILLIGRAM(S): 10 TABLET ORAL at 22:13

## 2017-08-06 RX ADMIN — Medication 3: at 09:19

## 2017-08-06 NOTE — CONSULT NOTE ADULT - ASSESSMENT
See above  Now stable with modification of RX  AFIB with slow VR in the 40's with 2.5 second pauses.  Cardiac enzymes negative and ECG otherwise unchanged   Metoprolol dose reduced, Brilinta stopped and Plavix and Eliquis started.  Now AFIB with controlled VR in the 60s  Pt ambulating and feeling fine today and will likely go home tomorrow if well and follow up with Dr. Painting in the office this week.

## 2017-08-06 NOTE — CONSULT NOTE ADULT - SUBJECTIVE AND OBJECTIVE BOX
E L E C T R O  P H Y S I O L O G Y     CONSULTATION NOTE   ________________________________________________      CHIEF COMPLAINT:Patient is a 82y old  Female who presents with a chief complaint of cp (05 Aug 2017 18:40)was found in a.fib with pause overnight.      HPI:  82F with CAD s/p CABG, s/p CAD x 25 stents, s/p AV and MV repair, DM2 on insulin, CKDIII presents for non-exertional CP a/w SOB. CP left-sided, began 30 mins after pt took Brillinta earlier this morning, increased w/ deep inspiration, no association w/ N/V, diaphoresis, warmth. Most recent cardiac cath/PCI three days ago on 08/02 w/ balloon angioplasty and ERICA to OM1. Pt also feels more SOB since this morning, struggling to catch her breath. She has had lower leg swelling in the past but none at present. She took Lasix 40 x1 today and notified her Cardiologist of chest pain.   PMD/Cardiologist: Dr. Lam Painting (05 Aug 2017 18:40)  pt also c/o intermittent dizziness ,but no hx of a.fib.    PAST MEDICAL & SURGICAL HISTORY:  CKD (chronic kidney disease) stage 3, GFR 30-59 ml/min  Chronic heart failure, unspecified heart failure type  Gout  Valvular heart disease  Hypothyroid  Coronary artery disease  HTN (hypertension)  Diabetes  H/O mitral valve repair  H/O aortic valve repair  S/P CABG x 3  H/O thyroidectomy  History of cholecystectomy      MEDICATIONS  (STANDING):  spironolactone 25 milliGRAM(s) Oral daily  aspirin enteric coated 81 milliGRAM(s) Oral daily  lisinopril 5 milliGRAM(s) Oral daily  insulin lispro Injectable (HumaLOG) 4 Unit(s) SubCutaneous two times a day with meals  allopurinol 100 milliGRAM(s) Oral daily  atorvastatin 80 milliGRAM(s) Oral at bedtime  furosemide    Tablet 40 milliGRAM(s) Oral daily  levothyroxine 100 MICROGram(s) Oral daily  clopidogrel Tablet 75 milliGRAM(s) Oral daily  insulin lispro (HumaLOG) corrective regimen sliding scale   SubCutaneous three times a day before meals  dextrose 5%. 1000 milliLiter(s) (50 mL/Hr) IV Continuous <Continuous>  dextrose 50% Injectable 12.5 Gram(s) IV Push once  dextrose 50% Injectable 25 Gram(s) IV Push once  dextrose 50% Injectable 25 Gram(s) IV Push once  metoprolol 75 milliGRAM(s) Oral two times a day  zolpidem 5 milliGRAM(s) Oral at bedtime  insulin glargine Injectable (LANTUS) 40 Unit(s) SubCutaneous at bedtime  apixaban 2.5 milliGRAM(s) Oral every 12 hours    MEDICATIONS  (PRN):  dextrose Gel 1 Dose(s) Oral once PRN Blood Glucose LESS THAN 70 milliGRAM(s)/deciliter  glucagon  Injectable 1 milliGRAM(s) IntraMuscular once PRN Glucose LESS THAN 70 milligrams/deciliter  acetaminophen   Tablet. 650 milliGRAM(s) Oral every 6 hours PRN Mild Pain (1 - 3)      FAMILY HISTORY:      SOCIAL HISTORY:    [ ] Non-smoker  [ ] Smoker  [ ] Alcohol    Allergies    Lidocaine Hydrochloride (Other)    Intolerances    	    REVIEW OF SYSTEMS:  CONSTITUTIONAL: No fever, weight loss, or fatigue  EYES: No eye pain, visual disturbances, or discharge  ENT:  No difficulty hearing, tinnitus, vertigo; No sinus or throat pain  NECK: No pain or stiffness  RESPIRATORY: No cough, wheezing, chills or hemoptysis;  + exertional Shortness of Breath  CARDIOVASCULAR: No chest pain, palpitations, passing out, dizziness, or leg swelling  GASTROINTESTINAL: No abdominal or epigastric pain. No nausea, vomiting, or hematemesis; No diarrhea or constipation. No melena or hematochezia.  GENITOURINARY: No dysuria, frequency, hematuria, or incontinence  NEUROLOGICAL: No headaches, memory loss, loss of strength, numbness, or tremors  SKIN: No itching, burning, rashes, or lesions   LYMPH Nodes: No enlarged glands  ENDOCRINE: No heat or cold intolerance; No hair loss  MUSCULOSKELETAL: No joint pain or swelling; No muscle, back, or extremity pain  PSYCHIATRIC: No depression, anxiety, mood swings, or difficulty sleeping  HEME/LYMPH: No easy bruising, or bleeding gums  ALLERGY AND IMMUNOLOGIC: No hives or eczema	    [ ] All others negative	  [ ] Unable to obtain    PHYSICAL EXAM:  T(C): 36.8 (08-06-17 @ 11:28), Max: 36.8 (08-05-17 @ 18:20)  HR: 67 (08-06-17 @ 11:28) (54 - 79)  BP: 103/75 (08-06-17 @ 11:28) (92/55 - 118/72)  RR: 18 (08-06-17 @ 11:28) (18 - 18)  SpO2: 95% (08-06-17 @ 11:28) (95% - 100%)  Wt(kg): --  I&O's Summary    05 Aug 2017 07:01  -  06 Aug 2017 07:00  --------------------------------------------------------  IN: 220 mL / OUT: 0 mL / NET: 220 mL        Appearance: Normal	  HEENT:   Normal oral mucosa, PERRL, EOMI	  Lymphatic: No lymphadenopathy  Cardiovascular: Normal S1 S2, No JVD, + systolic  murmurs, No edema  Respiratory: Lungs clear to auscultation	  Psychiatry: A & O x 3, Mood & affect appropriate  Gastrointestinal:  Soft, Non-tender, + BS	  Skin: No rashes, No ecchymoses, No cyanosis	  Neurologic: Non-focal  Extremities: Normal range of motion, No clubbing, cyanosis or edema  Vascular: Peripheral pulses palpable 2+ bilaterally    TELEMETRY: a.fib 50/80	    ECG:  	  RADIOLOGY:  OTHER: 	  	  LABS:	 	    CARDIAC MARKERS:  CARDIAC MARKERS ( 05 Aug 2017 20:24 )  x     / 0.01 ng/mL / x     / x     / x      CARDIAC MARKERS ( 05 Aug 2017 16:59 )  x     / <0.01 ng/mL / x     / x     / x      CARDIAC MARKERS ( 05 Aug 2017 11:42 )  x     / <0.01 ng/mL / x     / x     / x                                  13.8   6.92  )-----------( 181      ( 06 Aug 2017 08:49 )             43.2     08-06    137  |  101  |  33<H>  ----------------------------<  145<H>  4.9   |  20<L>  |  1.35<H>    Ca    9.2      06 Aug 2017 08:39  Phos  2.9     08-05  Mg     2.1     08-05    TPro  7.7  /  Alb  4.4  /  TBili  0.5  /  DBili  x   /  AST  29  /  ALT  38  /  AlkPhos  63  08-05    proBNP:   Lipid Profile:   HgA1c:   TSH: Thyroid Stimulating Hormone, Serum: 5.81 uIU/mL (08-05 @ 17:17)               E L E C T R O  P H Y S I O L O G Y     CONSULTATION NOTE   ________________________________________________      CHIEF COMPLAINT:Patient is a 82y old  Female who presents with a chief complaint of cp (05 Aug 2017 18:40)      HPI:  82F with CAD s/p CABG, s/p CAD x 25 stents, s/p AV and MV repair, DM2 on insulin, CKDIII presents for non-exertional CP a/w SOB. CP left-sided, began 30 mins after pt took Brillinta earlier this morning, increased w/ deep inspiration, no association w/ N/V, diaphoresis, warmth. Most recent cardiac cath/PCI three days ago on 08/02 w/ balloon angioplasty and ERICA to OM1. Pt also feels more SOB since this morning, struggling to catch her breath. She has had lower leg swelling in the past but none at present. She took Lasix 40 x1 today and notified her Cardiologist of chest pain.   PMD/Cardiologist: Dr. Lam Painting (05 Aug 2017 18:40)      PAST MEDICAL & SURGICAL HISTORY:  CKD (chronic kidney disease) stage 3, GFR 30-59 ml/min  Chronic heart failure, unspecified heart failure type  Gout  Valvular heart disease  Hypothyroid  Coronary artery disease  HTN (hypertension)  Diabetes  H/O mitral valve repair  H/O aortic valve repair  S/P CABG x 3  H/O thyroidectomy  History of cholecystectomy      MEDICATIONS  (STANDING):  spironolactone 25 milliGRAM(s) Oral daily  aspirin enteric coated 81 milliGRAM(s) Oral daily  lisinopril 5 milliGRAM(s) Oral daily  insulin lispro Injectable (HumaLOG) 4 Unit(s) SubCutaneous two times a day with meals  allopurinol 100 milliGRAM(s) Oral daily  atorvastatin 80 milliGRAM(s) Oral at bedtime  furosemide    Tablet 40 milliGRAM(s) Oral daily  levothyroxine 100 MICROGram(s) Oral daily  clopidogrel Tablet 75 milliGRAM(s) Oral daily  insulin lispro (HumaLOG) corrective regimen sliding scale   SubCutaneous three times a day before meals  dextrose 5%. 1000 milliLiter(s) (50 mL/Hr) IV Continuous <Continuous>  dextrose 50% Injectable 12.5 Gram(s) IV Push once  dextrose 50% Injectable 25 Gram(s) IV Push once  dextrose 50% Injectable 25 Gram(s) IV Push once  metoprolol 75 milliGRAM(s) Oral two times a day  zolpidem 5 milliGRAM(s) Oral at bedtime  insulin glargine Injectable (LANTUS) 40 Unit(s) SubCutaneous at bedtime  apixaban 2.5 milliGRAM(s) Oral every 12 hours    MEDICATIONS  (PRN):  dextrose Gel 1 Dose(s) Oral once PRN Blood Glucose LESS THAN 70 milliGRAM(s)/deciliter  glucagon  Injectable 1 milliGRAM(s) IntraMuscular once PRN Glucose LESS THAN 70 milligrams/deciliter  acetaminophen   Tablet. 650 milliGRAM(s) Oral every 6 hours PRN Mild Pain (1 - 3)      FAMILY HISTORY:      SOCIAL HISTORY:    [ ] Non-smoker  [ ] Smoker  [ ] Alcohol    Allergies    Lidocaine Hydrochloride (Other)    Intolerances    	    REVIEW OF SYSTEMS:  CONSTITUTIONAL: No fever, weight loss, or fatigue  EYES: No eye pain, visual disturbances, or discharge  ENT:  No difficulty hearing, tinnitus, vertigo; No sinus or throat pain  NECK: No pain or stiffness  RESPIRATORY: No cough, wheezing, chills or hemoptysis; No Shortness of Breath  CARDIOVASCULAR: No chest pain, palpitations, passing out, dizziness, or leg swelling  GASTROINTESTINAL: No abdominal or epigastric pain. No nausea, vomiting, or hematemesis; No diarrhea or constipation. No melena or hematochezia.  GENITOURINARY: No dysuria, frequency, hematuria, or incontinence  NEUROLOGICAL: No headaches, memory loss, loss of strength, numbness, or tremors  SKIN: No itching, burning, rashes, or lesions   LYMPH Nodes: No enlarged glands  ENDOCRINE: No heat or cold intolerance; No hair loss  MUSCULOSKELETAL: No joint pain or swelling; No muscle, back, or extremity pain  PSYCHIATRIC: No depression, anxiety, mood swings, or difficulty sleeping  HEME/LYMPH: No easy bruising, or bleeding gums  ALLERGY AND IMMUNOLOGIC: No hives or eczema	    [ ] All others negative	  [ ] Unable to obtain    PHYSICAL EXAM:  T(C): 36.8 (08-06-17 @ 11:28), Max: 36.8 (08-05-17 @ 18:20)  HR: 67 (08-06-17 @ 11:28) (54 - 79)  BP: 103/75 (08-06-17 @ 11:28) (92/55 - 118/72)  RR: 18 (08-06-17 @ 11:28) (18 - 18)  SpO2: 95% (08-06-17 @ 11:28) (95% - 100%)  Wt(kg): --  I&O's Summary    05 Aug 2017 07:01  -  06 Aug 2017 07:00  --------------------------------------------------------  IN: 220 mL / OUT: 0 mL / NET: 220 mL        Appearance: Normal	  HEENT:   Normal oral mucosa, PERRL, EOMI	  Lymphatic: No lymphadenopathy  Cardiovascular: Normal S1 S2, No JVD, No murmurs, No edema  Respiratory: Lungs clear to auscultation	  Psychiatry: A & O x 3, Mood & affect appropriate  Gastrointestinal:  Soft, Non-tender, + BS	  Skin: No rashes, No ecchymoses, No cyanosis	  Neurologic: Non-focal  Extremities: Normal range of motion, No clubbing, cyanosis or edema  Vascular: Peripheral pulses palpable 2+ bilaterally    TELEMETRY: 	    ECG:  	  RADIOLOGY:  OTHER: 	  	  LABS:	 	    CARDIAC MARKERS:  CARDIAC MARKERS ( 05 Aug 2017 20:24 )  x     / 0.01 ng/mL / x     / x     / x      CARDIAC MARKERS ( 05 Aug 2017 16:59 )  x     / <0.01 ng/mL / x     / x     / x      CARDIAC MARKERS ( 05 Aug 2017 11:42 )  x     / <0.01 ng/mL / x     / x     / x                                  13.8   6.92  )-----------( 181      ( 06 Aug 2017 08:49 )             43.2     08-06    137  |  101  |  33<H>  ----------------------------<  145<H>  4.9   |  20<L>  |  1.35<H>    Ca    9.2      06 Aug 2017 08:39  Phos  2.9     08-05  Mg     2.1     08-05    TPro  7.7  /  Alb  4.4  /  TBili  0.5  /  DBili  x   /  AST  29  /  ALT  38  /  AlkPhos  63  08-05    proBNP:   Lipid Profile:   HgA1c:   TSH: Thyroid Stimulating Hormone, Serum: 5.81 uIU/mL (08-05 @ 17:17)      cath result note.

## 2017-08-06 NOTE — CONSULT NOTE ADULT - ATTENDING COMMENTS
I personally examined this patient
Patient seen and examined. Agree with assessment and plan as outlined above. S/p PCI to OM this week for chest pain. Patient started on Brilinta. She began to have dyspnea. On arrival noted to be in AF. Patient previously had AF immediately after OHS for valve repair. She denies palpitations. Her breathing does seem improved after stopping Brilinta and change to plavix. Patient now with AF with elevated CHADS-Vasc score and therefore should be on A/C. Eliquis is likely reasonable as patient does not have prosthetic valve. Patient burak at times without symptoms. Lopressor was decreased now 75 BID. Can decrease further if required.     Attempts made to contact Dr. Painting's group yesterday. Will attempt again.    Please page [phone] with questions or call the office [phone].

## 2017-08-06 NOTE — CONSULT NOTE ADULT - SUBJECTIVE AND OBJECTIVE BOX
Patient is a 82y old  Female who presents with a chief complaint of cp (05 Aug 2017 18:40)      HPI:  82F with CAD s/p CABG, s/p CAD x 25 stents, s/p AV and MV repair, DM2 on insulin, CKDIII presents for non-exertional CP a/w SOB. CP left-sided, began 30 mins after pt took Brillinta earlier this morning, increased w/ deep inspiration, no association w/ N/V, diaphoresis, warmth. Most recent cardiac cath/PCI three days ago on  w/ balloon angioplasty and ERICA to OM1. Pt also feels more SOB since this morning, struggling to catch her breath. She has had lower leg swelling in the past but none at present. She took Lasix 40 x1 today and notified her Cardiologist of chest pain.   PMD/Cardiologist: Dr. Lam Painting (05 Aug 2017 18:40)    Pt feels fine today.  I reviewed hospital course.  Pt came to ER as above.  Noted to have AFIB with slow VR in the 40's with 2.5 second pauses.  Cardiac enzymes negative and ECG otherwise unchanged  I spoke with ER and hospital staff.  Metoprolol dose reduced, Brilinta stopped and Plavix and Eliquis started.  Pt ambulating and feeling fine today and will likely go home tomorrow if well and follow up with Dr. Painting in the office this week.  Pt still in AFIB today with VR in the 60s.  She has a h/o Parox AFIB      PAST MEDICAL & SURGICAL HISTORY:  CKD (chronic kidney disease) stage 3, GFR 30-59 ml/min  Chronic heart failure, unspecified heart failure type  Gout  Valvular heart disease  Hypothyroid  Coronary artery disease  HTN (hypertension)  Diabetes  H/O mitral valve repair  H/O aortic valve repair  S/P CABG x 3  Many Stents  Parox AFIB  H/O thyroidectomy  History of cholecystectomy      ALLERGIES:    Lidocaine Hydrochloride (Other)       MEDICATIONS  (STANDING):  spironolactone 25 milliGRAM(s) Oral daily  aspirin enteric coated 81 milliGRAM(s) Oral daily  lisinopril 5 milliGRAM(s) Oral daily  insulin lispro Injectable (HumaLOG) 4 Unit(s) SubCutaneous two times a day with meals  allopurinol 100 milliGRAM(s) Oral daily  atorvastatin 80 milliGRAM(s) Oral at bedtime  furosemide    Tablet 40 milliGRAM(s) Oral daily  levothyroxine 100 MICROGram(s) Oral daily  clopidogrel Tablet 75 milliGRAM(s) Oral daily  insulin lispro (HumaLOG) corrective regimen sliding scale   SubCutaneous three times a day before meals  dextrose 5%. 1000 milliLiter(s) (50 mL/Hr) IV Continuous <Continuous>  dextrose 50% Injectable 12.5 Gram(s) IV Push once  dextrose 50% Injectable 25 Gram(s) IV Push once  dextrose 50% Injectable 25 Gram(s) IV Push once  zolpidem 5 milliGRAM(s) Oral at bedtime  insulin glargine Injectable (LANTUS) 40 Unit(s) SubCutaneous at bedtime  apixaban 2.5 milliGRAM(s) Oral every 12 hours  metoprolol 50 milliGRAM(s) Oral two times a day    MEDICATIONS  (PRN):  dextrose Gel 1 Dose(s) Oral once PRN Blood Glucose LESS THAN 70 milliGRAM(s)/deciliter  glucagon  Injectable 1 milliGRAM(s) IntraMuscular once PRN Glucose LESS THAN 70 milligrams/deciliter  acetaminophen   Tablet. 650 milliGRAM(s) Oral every 6 hours PRN Mild Pain (1 - 3)        ROS:     A comprehensive review of systems was performed and pertinent items are noted in the history above. A detailed ROS is as follows:    Constitutional	     No appetite changes, chills, fatigue, fevers, malaise, sweats and weight gain / loss.  Eyes: 	                         No  icterus, redness and visual disturbance.  ENT, mouth and face:	     No  ear discharge, hearing loss, epistaxis, nasal congestion, oral sores, dental/gum infection, sore throat, hoarseness.  Neck:	                         No thyroid enlargement, neck pain, swollen glands and difficulty in swallowing  Respiratory:                       See HPI  Cardiovascular:                  See HPI   Gastrointestinal:	      No abdominal pain, nausea, vomiting, change in bowel habits, GERD, dyspepsia, dysphagia, GI bleed.  Genitourinary:	      No genital lesions, discharge, bleeding, dysuria, frequency, hematuria and urinary incontinence.  Skin / Breast: 	      No breast lump, breast tenderness. No skin rash, redness, pruritis, swelling dryness, fissures.  Hematologic/lymphatic:   No bleeding disorder, clotting disorder, petechial rash, easy bruising and lymphadenopathy.  Musculoskeletal:	      No arthralgias, back pain, bone pain, muscle weakness, myalgias, neck pain and stiff joints  Vascular:	                          No leg pain, cramps, discoloration or edema. No varicose veins  Neurological:	      No gait problems, headaches, memory issues, paresthesia, seizures, speech issues, tremors, vertigo,  weakness  Behavioral/Psych: 	      No mood change, depression, anxiety, suicidal attempts.  Endocrine:	                          No blurry vision, polydipsia, polyphagia, polyuria, skin dryness and temperature intolerance.  Allergic/Immunologic:	      Negative for anaphylaxis, angioedema and urticaria.      I&O's Summary    05 Aug 2017 07:01  -  06 Aug 2017 07:00  --------------------------------------------------------  IN: 220 mL / OUT: 0 mL / NET: 220 mL        PHYSICAL EXAM:  Daily Height in cm: 165.1 (05 Aug 2017 20:23)    Daily Weight in k.6 (06 Aug 2017 07:56)  Drug Dosing Weight  Height (cm): 165.1 (05 Aug 2017 20:23)  Weight (kg): 82.6 (05 Aug 2017 20:23)  BMI (kg/m2): 30.3 (05 Aug 2017 20:23)  BSA (m2): 1.9 (05 Aug 2017 20:23)  General Appearance:    Comfortable, AAO X 3, in no distress.   HEENT:                       Atraumatic, PERRLA, EOMI, conjunctiva clear.   Neck:                          Supple, no adenopathy, no thyromegaly, no JVD, no carotid bruit  Back:                          Symmetric, no CV angle fullness or tenderness, no soft tissue tenderness.  Chest:                         Clear to auscultation, no wheezes, rales or rhonchi, symmetric air entry, no tachypnea, retractions or cyanosis  Heart:                          AFIB S1, S2 normal, no gallop, no murmur.  Abdomen:                    Soft, non-tender, bowel sounds active. No palpable masses.   Extremities:                 no cyanosis, no edema, no joint swelling. Peripheral pulses normal  Skin:                           Skin color, texture normal. No rashes   Neurologic:                  Alert and oriented X3, cranial nerves intact, sensory and motor normal.      TELEMETRY: AFIB with VR in the 60's.  Max 2.5 second pause    12 Lead ECG (08.05.17 @ 13:51) >  Ventricular Rate 52 BPM    Atrial Rate 300 BPM    QRS Duration 90 ms     ms    QTc 446 ms    R Axis -8 degrees    T Axis -74 degrees    Diagnosis Line ATRIAL FIBRILLATION WITH SLOW VENTRICULAR RESPONSE  ST & T WAVE ABNORMALITY, CONSIDER INFERIOR ISCHEMIA OR DIGITALIS EFFECT    < end of copied text >      LABS:  CARDIAC MARKERS ( 05 Aug 2017 20:24 )  x     / 0.01 ng/mL / x     / x     / x      CARDIAC MARKERS ( 05 Aug 2017 16:59 )  x     / <0.01 ng/mL / x     / x     / x      CARDIAC MARKERS ( 05 Aug 2017 11:42 )  x     / <0.01 ng/mL / x     / x     / x                                13.8   6.92  )-----------( 181      ( 06 Aug 2017 08:49 )             43.2         137  |  101  |  33<H>  ----------------------------<  145<H>  4.9   |  20<L>  |  1.35<H>    Ca    9.2      06 Aug 2017 08:39  Phos  2.9       Mg     2.1         TPro  7.7  /  Alb  4.4  /  TBili  0.5  /  DBili  x   /  AST  29  /  ALT  38  /  AlkPhos  63          0802 @ 15:01  Trop-I  --  CK      150  CK-MB   --    Pro BNP  --  @ 11:43  D Dimer  161  @ 11:43  Pro BNP  446  @ 11:42  D Dimer  --  @ 11:42  Pro BNP  400  @ 15:01  D Dimer  --  @ 15:01    PT/INR - ( 05 Aug 2017 11:43 )   PT: 10.3 sec;   INR: 0.95 ratio         PTT - ( 05 Aug 2017 11:43 )  PTT:33.0 sec          Vital Signs Last 24 Hrs  T(C): 36.8 (06 Aug 2017 11:28), Max: 36.8 (05 Aug 2017 18:20)  T(F): 98.2 (06 Aug 2017 11:28), Max: 98.2 (05 Aug 2017 18:20)  HR: 67 (06 Aug 2017 11:28) (54 - 79)  BP: 103/75 (06 Aug 2017 11:28) (92/55 - 118/72)  BP(mean): --  RR: 18 (06 Aug 2017 11:28) (18 - 18)  SpO2: 95% (06 Aug 2017 11:28) (95% - 100%)        CARDIAC ISSUES - PROBLEM Dx:  Shortness of breath: Shortness of breath  Valvular heart disease - satble  Coronary artery disease  HTN (hypertension)  H/O mitral valve repair  H/O aortic valve repair  S/P CABG x 3  Many Stents  Parox AFIB - in AFIB now with controlled VR

## 2017-08-06 NOTE — PROGRESS NOTE ADULT - ASSESSMENT
INTOLERANT  OF  BRILINTA   ON PLAVIX, FEELING BETER    AFIB, ON  ELIQUIS    CAD,  HTN, DM INTOLERANT  OF  BRILINTA,  cad,  mlple  stents   ON PLAVIX, FEELING BETTER    AFIB, ON  ELIQUIS,      CAD,  HTN, DM  tele,  pause  2.5  sec. SSX,  EP TO  SEE PT, DISCUSSED  WITH  FELLOW

## 2017-08-07 LAB
ANION GAP SERPL CALC-SCNC: 12 MMOL/L — SIGNIFICANT CHANGE UP (ref 5–17)
BUN SERPL-MCNC: 28 MG/DL — HIGH (ref 7–23)
CALCIUM SERPL-MCNC: 9.2 MG/DL — SIGNIFICANT CHANGE UP (ref 8.4–10.5)
CHLORIDE SERPL-SCNC: 102 MMOL/L — SIGNIFICANT CHANGE UP (ref 96–108)
CO2 SERPL-SCNC: 23 MMOL/L — SIGNIFICANT CHANGE UP (ref 22–31)
CREAT SERPL-MCNC: 1.38 MG/DL — HIGH (ref 0.5–1.3)
GLUCOSE SERPL-MCNC: 122 MG/DL — HIGH (ref 70–99)
POTASSIUM SERPL-MCNC: 4.7 MMOL/L — SIGNIFICANT CHANGE UP (ref 3.5–5.3)
POTASSIUM SERPL-SCNC: 4.7 MMOL/L — SIGNIFICANT CHANGE UP (ref 3.5–5.3)
SODIUM SERPL-SCNC: 137 MMOL/L — SIGNIFICANT CHANGE UP (ref 135–145)

## 2017-08-07 PROCEDURE — 93306 TTE W/DOPPLER COMPLETE: CPT | Mod: 26

## 2017-08-07 RX ORDER — INSULIN LISPRO 100/ML
7 VIAL (ML) SUBCUTANEOUS
Refills: 0 | Status: DISCONTINUED | OUTPATIENT
Start: 2017-08-07 | End: 2017-08-08

## 2017-08-07 RX ORDER — INSULIN LISPRO 100/ML
VIAL (ML) SUBCUTANEOUS AT BEDTIME
Refills: 0 | Status: DISCONTINUED | OUTPATIENT
Start: 2017-08-07 | End: 2017-08-08

## 2017-08-07 RX ORDER — INSULIN LISPRO 100/ML
4 VIAL (ML) SUBCUTANEOUS
Refills: 0 | Status: DISCONTINUED | OUTPATIENT
Start: 2017-08-07 | End: 2017-08-07

## 2017-08-07 RX ORDER — INSULIN LISPRO 100/ML
5 VIAL (ML) SUBCUTANEOUS
Refills: 0 | Status: DISCONTINUED | OUTPATIENT
Start: 2017-08-07 | End: 2017-08-08

## 2017-08-07 RX ORDER — METOPROLOL TARTRATE 50 MG
25 TABLET ORAL THREE TIMES A DAY
Refills: 0 | Status: DISCONTINUED | OUTPATIENT
Start: 2017-08-07 | End: 2017-08-08

## 2017-08-07 RX ADMIN — APIXABAN 5 MILLIGRAM(S): 2.5 TABLET, FILM COATED ORAL at 17:01

## 2017-08-07 RX ADMIN — Medication 81 MILLIGRAM(S): at 11:44

## 2017-08-07 RX ADMIN — Medication 5 UNIT(S): at 17:01

## 2017-08-07 RX ADMIN — Medication 40 MILLIGRAM(S): at 05:36

## 2017-08-07 RX ADMIN — Medication 25 MILLIGRAM(S): at 21:56

## 2017-08-07 RX ADMIN — CLOPIDOGREL BISULFATE 75 MILLIGRAM(S): 75 TABLET, FILM COATED ORAL at 11:44

## 2017-08-07 RX ADMIN — ZOLPIDEM TARTRATE 5 MILLIGRAM(S): 10 TABLET ORAL at 21:56

## 2017-08-07 RX ADMIN — ATORVASTATIN CALCIUM 80 MILLIGRAM(S): 80 TABLET, FILM COATED ORAL at 21:56

## 2017-08-07 RX ADMIN — INSULIN GLARGINE 40 UNIT(S): 100 INJECTION, SOLUTION SUBCUTANEOUS at 21:57

## 2017-08-07 RX ADMIN — Medication 100 MILLIGRAM(S): at 11:45

## 2017-08-07 RX ADMIN — Medication 4 UNIT(S): at 08:42

## 2017-08-07 RX ADMIN — SPIRONOLACTONE 25 MILLIGRAM(S): 25 TABLET, FILM COATED ORAL at 09:11

## 2017-08-07 RX ADMIN — Medication 25 MILLIGRAM(S): at 13:14

## 2017-08-07 RX ADMIN — Medication 100 MICROGRAM(S): at 05:35

## 2017-08-07 RX ADMIN — Medication 3: at 12:28

## 2017-08-07 RX ADMIN — APIXABAN 5 MILLIGRAM(S): 2.5 TABLET, FILM COATED ORAL at 05:36

## 2017-08-07 RX ADMIN — LISINOPRIL 5 MILLIGRAM(S): 2.5 TABLET ORAL at 05:35

## 2017-08-07 NOTE — PROGRESS NOTE ADULT - ASSESSMENT
Assessment:  Stable ASHD  No ACS  No CHF  AFIB with slow VR on Metoprolol 50mg BID with low HR and low BP and lightheadedness.    Plan:  Reduce Metoprolol to 5omg daily  Ambulate and monitor.  Discharge today or tomorrow depending on HR/BP and symptoms  TTE today  Continue other management.

## 2017-08-07 NOTE — PROGRESS NOTE ADULT - ASSESSMENT
a.fib hr down to 30 with boarderline hypotension.  decrease lopressor to 25 mg [id number]  observe on tele  ac

## 2017-08-07 NOTE — PROVIDER CONTACT NOTE (MEDICATION) - ASSESSMENT
pts HR 38-60 BPM in sleep, pt currently awake sustaining 60s on monitor. Should pt receive Metoprolol this morning, medication does not have parameters.

## 2017-08-07 NOTE — DIETITIAN INITIAL EVALUATION ADULT. - ENERGY NEEDS
Ht: 5'8", Wt: 181.8lbs, BMI: 27.6kg/m2, IBW: 140lbs(+/-10%), 129%IBW  Pertinent information: Pt admitted for chest pain. She had recent cardiac cath with ERICA x1 and angioplasty. Per chart ,Pt with Afib and on eliquis. Pt pending ECHO today  No Edema,  Skin intact

## 2017-08-07 NOTE — PROVIDER CONTACT NOTE (MEDICATION) - SITUATION
pts HR 60's on cardiac monitor, pt 38-50's in sleep. Pt due for Metoprolol 50mg this morning, should pt receive medication

## 2017-08-07 NOTE — DIETITIAN INITIAL EVALUATION ADULT. - OTHER INFO
Nutrition consult received for Hgba1c of 9.7%. Pt seen, reports a good PO intake in house. Pt reports checking BG levels 1x daily with usual results of 150-180mg/dl. Pt takes 44 units of lantus and Humalog sliding scale. Pt states her scale is from 1-10units however since she doesn't check her sugars, she only ever takes 6 units. Pt has an endocrinolgoist who she follows with. Pt was amenable to T2DM and Heart healthy eating nutrition therapy. Pt accepted written education materials, Pt denies chewing/swallowing difficulty. Pt takes Vit D, Vit C, K+ and fish oil. Pt denies GI distress. NKFA

## 2017-08-07 NOTE — CHART NOTE - NSCHARTNOTEFT_GEN_A_CORE
Patient's blood sugar continues to be elevated - prebreakfast blood sugar is126 and elevated prelunch 263 and bedtime blood sugar on 8/7 213  Increased Pre breakfast humalog to 7 units and predinner humalog to 5 units and added bedtime sliding scale    Contact - [phone]

## 2017-08-07 NOTE — DIETITIAN INITIAL EVALUATION ADULT. - ORAL INTAKE PTA
Pt reports a good PO intake PTA. Breakfast: 2 slices rasian bran bread with cream cheese, lunch: chicken/tuna/egg salad.  Dinner: chicken, vegetables, starches. Pt drinks water, Pt snacks on fruit./good

## 2017-08-07 NOTE — PROVIDER CONTACT NOTE (OTHER) - ASSESSMENT
pts vss, no s/s of chest pain, SOB or discomfort. Pt resting comfortably in bed.
pts sleeping, asymptomatic. HR low as 38 on cardiac monitor. Pt due to receive Metoprolol 75mg with AM meds.
Pt a&0x4, vss. No c/o of chest pain sob or palpitations.
pt asymptomatic, B/P 92/55, HR 58. pt asleep when pause occurred.

## 2017-08-07 NOTE — PROVIDER CONTACT NOTE (OTHER) - SITUATION
pts HR low as 38 on cardiac monitor, not sustaining. HR between 38-60 BMP
Pt now in NSR on tele, converted from a-fib
pt had 3.01 sec pause on cardiac monitor
pts HR sustaining 40's on cardiac monitor

## 2017-08-07 NOTE — DIETITIAN INITIAL EVALUATION ADULT. - SOURCE
RN, medical record, Pt's significant other at bedside./patient/family/significant other/other (specify)

## 2017-08-07 NOTE — PROVIDER CONTACT NOTE (OTHER) - ACTION/TREATMENT ORDERED:
NP notified, place R2 pads on pt with defibrillator at bedside. Hold Am dose of metoprolol until further evaluation is done. Will continue to monitor
metoprolol adjusted, will cont to monitor on tele
NP notified,  Defibrillator at bedside, R2 pads applied as per NP, will continue to monitor
NP notified, push back Metoprolol in AM. Push back Spironolactone in AM. Will reevaluate pt. Will continue to monitor

## 2017-08-07 NOTE — PROVIDER CONTACT NOTE (MEDICATION) - ACTION/TREATMENT ORDERED:
NP notified, OK to push off medication until 8 am. Will consult with cardiology to see if dose should be lowered. Will continue to monitor

## 2017-08-08 ENCOUNTER — TRANSCRIPTION ENCOUNTER (OUTPATIENT)
Age: 82
End: 2017-08-08

## 2017-08-08 VITALS
SYSTOLIC BLOOD PRESSURE: 112 MMHG | OXYGEN SATURATION: 96 % | HEART RATE: 69 BPM | DIASTOLIC BLOOD PRESSURE: 62 MMHG | TEMPERATURE: 98 F | RESPIRATION RATE: 17 BRPM

## 2017-08-08 LAB
ANION GAP SERPL CALC-SCNC: 14 MMOL/L — SIGNIFICANT CHANGE UP (ref 5–17)
BUN SERPL-MCNC: 32 MG/DL — HIGH (ref 7–23)
CALCIUM SERPL-MCNC: 9.2 MG/DL — SIGNIFICANT CHANGE UP (ref 8.4–10.5)
CHLORIDE SERPL-SCNC: 96 MMOL/L — SIGNIFICANT CHANGE UP (ref 96–108)
CO2 SERPL-SCNC: 24 MMOL/L — SIGNIFICANT CHANGE UP (ref 22–31)
CREAT SERPL-MCNC: 1.24 MG/DL — SIGNIFICANT CHANGE UP (ref 0.5–1.3)
GLUCOSE SERPL-MCNC: 103 MG/DL — HIGH (ref 70–99)
POTASSIUM SERPL-MCNC: 4.6 MMOL/L — SIGNIFICANT CHANGE UP (ref 3.5–5.3)
POTASSIUM SERPL-SCNC: 4.6 MMOL/L — SIGNIFICANT CHANGE UP (ref 3.5–5.3)
SODIUM SERPL-SCNC: 134 MMOL/L — LOW (ref 135–145)

## 2017-08-08 PROCEDURE — 85730 THROMBOPLASTIN TIME PARTIAL: CPT

## 2017-08-08 PROCEDURE — 82947 ASSAY GLUCOSE BLOOD QUANT: CPT

## 2017-08-08 PROCEDURE — 93455 CORONARY ART/GRFT ANGIO S&I: CPT

## 2017-08-08 PROCEDURE — 80048 BASIC METABOLIC PNL TOTAL CA: CPT

## 2017-08-08 PROCEDURE — 83880 ASSAY OF NATRIURETIC PEPTIDE: CPT

## 2017-08-08 PROCEDURE — C9600: CPT

## 2017-08-08 PROCEDURE — 82550 ASSAY OF CK (CPK): CPT

## 2017-08-08 PROCEDURE — C1725: CPT

## 2017-08-08 PROCEDURE — C1769: CPT

## 2017-08-08 PROCEDURE — 85379 FIBRIN DEGRADATION QUANT: CPT

## 2017-08-08 PROCEDURE — 84484 ASSAY OF TROPONIN QUANT: CPT

## 2017-08-08 PROCEDURE — 82330 ASSAY OF CALCIUM: CPT

## 2017-08-08 PROCEDURE — 80053 COMPREHEN METABOLIC PANEL: CPT

## 2017-08-08 PROCEDURE — 83605 ASSAY OF LACTIC ACID: CPT

## 2017-08-08 PROCEDURE — 71045 X-RAY EXAM CHEST 1 VIEW: CPT

## 2017-08-08 PROCEDURE — C1894: CPT

## 2017-08-08 PROCEDURE — C1887: CPT

## 2017-08-08 PROCEDURE — 85027 COMPLETE CBC AUTOMATED: CPT

## 2017-08-08 PROCEDURE — 93005 ELECTROCARDIOGRAM TRACING: CPT

## 2017-08-08 PROCEDURE — 84132 ASSAY OF SERUM POTASSIUM: CPT

## 2017-08-08 PROCEDURE — 84100 ASSAY OF PHOSPHORUS: CPT

## 2017-08-08 PROCEDURE — 82803 BLOOD GASES ANY COMBINATION: CPT

## 2017-08-08 PROCEDURE — 82553 CREATINE MB FRACTION: CPT

## 2017-08-08 PROCEDURE — 84443 ASSAY THYROID STIM HORMONE: CPT

## 2017-08-08 PROCEDURE — 99152 MOD SED SAME PHYS/QHP 5/>YRS: CPT

## 2017-08-08 PROCEDURE — 99285 EMERGENCY DEPT VISIT HI MDM: CPT | Mod: 25

## 2017-08-08 PROCEDURE — 93306 TTE W/DOPPLER COMPLETE: CPT

## 2017-08-08 PROCEDURE — 85610 PROTHROMBIN TIME: CPT

## 2017-08-08 PROCEDURE — 82435 ASSAY OF BLOOD CHLORIDE: CPT

## 2017-08-08 PROCEDURE — 99153 MOD SED SAME PHYS/QHP EA: CPT

## 2017-08-08 PROCEDURE — 84295 ASSAY OF SERUM SODIUM: CPT

## 2017-08-08 PROCEDURE — 83036 HEMOGLOBIN GLYCOSYLATED A1C: CPT

## 2017-08-08 PROCEDURE — 83735 ASSAY OF MAGNESIUM: CPT

## 2017-08-08 PROCEDURE — C1874: CPT

## 2017-08-08 PROCEDURE — 85014 HEMATOCRIT: CPT

## 2017-08-08 RX ORDER — APIXABAN 2.5 MG/1
1 TABLET, FILM COATED ORAL
Qty: 0 | Refills: 0 | DISCHARGE
Start: 2017-08-08

## 2017-08-08 RX ORDER — CLOPIDOGREL BISULFATE 75 MG/1
1 TABLET, FILM COATED ORAL
Qty: 0 | Refills: 0 | DISCHARGE
Start: 2017-08-08

## 2017-08-08 RX ORDER — METOPROLOL TARTRATE 50 MG
1 TABLET ORAL
Qty: 0 | Refills: 0 | DISCHARGE
Start: 2017-08-08

## 2017-08-08 RX ORDER — CLOPIDOGREL BISULFATE 75 MG/1
1 TABLET, FILM COATED ORAL
Qty: 30 | Refills: 0
Start: 2017-08-08 | End: 2017-09-07

## 2017-08-08 RX ORDER — METOPROLOL TARTRATE 50 MG
1 TABLET ORAL
Qty: 90 | Refills: 0
Start: 2017-08-08 | End: 2017-09-07

## 2017-08-08 RX ORDER — APIXABAN 2.5 MG/1
1 TABLET, FILM COATED ORAL
Qty: 60 | Refills: 0
Start: 2017-08-08 | End: 2017-09-07

## 2017-08-08 RX ORDER — INSULIN LISPRO 100/ML
5 VIAL (ML) SUBCUTANEOUS
Qty: 0 | Refills: 0 | DISCHARGE
Start: 2017-08-08

## 2017-08-08 RX ORDER — INSULIN LISPRO 100/ML
7 VIAL (ML) SUBCUTANEOUS
Qty: 0 | Refills: 0 | DISCHARGE
Start: 2017-08-08

## 2017-08-08 RX ADMIN — Medication 40 MILLIGRAM(S): at 05:53

## 2017-08-08 RX ADMIN — CLOPIDOGREL BISULFATE 75 MILLIGRAM(S): 75 TABLET, FILM COATED ORAL at 11:18

## 2017-08-08 RX ADMIN — Medication 100 MICROGRAM(S): at 05:53

## 2017-08-08 RX ADMIN — Medication 100 MILLIGRAM(S): at 11:18

## 2017-08-08 RX ADMIN — APIXABAN 5 MILLIGRAM(S): 2.5 TABLET, FILM COATED ORAL at 05:53

## 2017-08-08 RX ADMIN — SPIRONOLACTONE 25 MILLIGRAM(S): 25 TABLET, FILM COATED ORAL at 05:53

## 2017-08-08 RX ADMIN — Medication 7 UNIT(S): at 09:19

## 2017-08-08 RX ADMIN — Medication 81 MILLIGRAM(S): at 11:18

## 2017-08-08 RX ADMIN — Medication 1: at 12:13

## 2017-08-08 RX ADMIN — Medication 25 MILLIGRAM(S): at 05:53

## 2017-08-08 RX ADMIN — LISINOPRIL 5 MILLIGRAM(S): 2.5 TABLET ORAL at 05:53

## 2017-08-08 NOTE — DISCHARGE NOTE ADULT - MEDICATION SUMMARY - MEDICATIONS TO CHANGE
I will SWITCH the dose or number of times a day I take the medications listed below when I get home from the hospital:    HumaLOG 100 units/mL subcutaneous solution  -- 6 unit(s) subcutaneous 2 times a day    Levemir 100 units/mL subcutaneous solution  -- 44 unit(s) subcutaneous once a day (at bedtime)    metoprolol tartrate 100 mg oral tablet  -- 1 tab(s) by mouth 2 times a day

## 2017-08-08 NOTE — DISCHARGE NOTE ADULT - SECONDARY DIAGNOSIS.
Coronary artery disease involving coronary bypass graft of native heart without angina pectoris Essential hypertension Type 2 diabetes mellitus with other circulatory complication Hypothyroidism, unspecified type CKD (chronic kidney disease) Gout

## 2017-08-08 NOTE — PROGRESS NOTE ADULT - ASSESSMENT
pt with paf ,hr is controlled  pt may need antiarrythmic to keep pt in nsr possible amio  continue ac and beta blocker   f/u as out pt.

## 2017-08-08 NOTE — DISCHARGE NOTE ADULT - CARE PLAN
Principal Discharge DX:	Atrial fibrillation with slow ventricular response  Goal:	Rate controlled  Instructions for follow-up, activity and diet:	Atrial fibrillation is the most common heart rhythm problem.  The condition puts you at risk for has stroke and heart attack  It helps if you control your blood pressure, not drink more than 1-2 alcohol drinks per day, cut down on caffeine, getting treatment for over active thyroid gland, and get regular exercise  Call your doctor if you feel your heart racing or beating unusually, chest tightness or pain, lightheaded, faint, shortness of breath especially with exercise  It is important to take your heart medication as prescribed  You are on a blood thinner - Eliquis  Follow up with Cardiologist - Dr. Painting on 8/10/17 at 4pm  Secondary Diagnosis:	Coronary artery disease involving coronary bypass graft of native heart without angina pectoris  Instructions for follow-up, activity and diet:	Continue your medications. Do not stop Aspirin or Plavix unless instructed by your cardiologist.   No heavy lifting, strenuous activity, bending, straining or unnecessary stair climbing for 2 weeks.  Follow up with Cardiologist - Dr. Painting on 8/10/17 at 4pm  Secondary Diagnosis:	Essential hypertension  Instructions for follow-up, activity and diet:	Take medications for your blood pressure as recommended.  Eat a heart healthy diet that is low in saturated fats and salt, and includes whole grains, fruits, vegetables and lean protein   Exercise regularly (consult with your physician or cardiologist first); maintain a heart healthy weight.   If you smoke - quit (A resource to help you stop smoking is the [facility] – phone number 628-294-7903.). Continue to follow with your primary physician or cardiologist.   Seek medical help for dizziness, Lightheadedness, Blurry vision, Headache, Chest pain, Shortness of breath  Follow up with your medical doctor to establish long term blood pressure treatment goals.  Secondary Diagnosis:	Type 2 diabetes mellitus with other circulatory complication  Instructions for follow-up, activity and diet:	Your next appointment with PMD - Dr. Carty is on 9/12 at 1130pm  HgA1C this admission - 9.7  Make sure you get your HgA1c checked every three months.  If you take insulin, check your blood glucose before meals and at bedtime.  It's important not to skip any meals.  Keep a log of your blood glucose results and always take it with you to your doctor appointments.  Keep a list of your current medications including injectables and over the counter medications and bring this medication list with you to all your doctor appointments.  If you have not seen your ophthalmologist this year call for appointment.  Check your feet daily for redness, sores, or openings. Do not self treat. If no improvement in two days call your primary care physician for an appointment.  Low blood sugar (hypoglycemia) is a blood sugar below 70mg/dl. Check your blood sugar if you feel signs/symptoms of hypoglycemia. If your blood sugar is below 70 take 15 grams of carbohydrates (ex 4 oz of apple juice, 3-4 glucose tablets, or 4-6 oz of regular soda) wait 15 minutes and repeat blood sugar to make sure it comes up above 70.  If your blood sugar is above 70 and you are due for a meal, have a meal.  If you are not due for a meal have a snack.  This snack helps keeps your blood sugar at a safe range.  Secondary Diagnosis:	CKD (chronic kidney disease)  Instructions for follow-up, activity and diet:	Avoid taking (NSAIDs) - (ex: Ibuprofen, Advil, Celebrex, Naprosyn)  Avoid taking any nephrotoxic agents (can harm kidneys) - Intravenous contrast for diagnostic testing, combination cold medications.  Have all medications adjusted for your renal function by your Health Care Provider.  Blood pressure control is important.  Take all medication as prescribed.  Secondary Diagnosis:	Hypothyroidism, unspecified type  Instructions for follow-up, activity and diet:	you do not make enough thyroid hormone  signs & symptoms of low levels of thyroid hormone - tired, getting cold easily, coarse or thin hair, constipation, shortness of breath, swelling, irregular periods  your doctor will do thyroid hormone blood tests at least once a year to monitor if medication dose is adequate  take your thyroid medicine as directed by your doctor & on empty stomach  Secondary Diagnosis:	Gout  Instructions for follow-up, activity and diet:	Continue allopurinol Principal Discharge DX:	Atrial fibrillation with slow ventricular response  Goal:	Rate controlled  Instructions for follow-up, activity and diet:	Atrial fibrillation is the most common heart rhythm problem.  The condition puts you at risk for has stroke and heart attack  It helps if you control your blood pressure, not drink more than 1-2 alcohol drinks per day, cut down on caffeine, getting treatment for over active thyroid gland, and get regular exercise  Call your doctor if you feel your heart racing or beating unusually, chest tightness or pain, lightheaded, faint, shortness of breath especially with exercise  It is important to take your heart medication as prescribed  You are on a blood thinner - Eliquis  Follow up with Cardiologist - Dr. Painting on 8/10/17 at 4pm  Secondary Diagnosis:	Coronary artery disease involving coronary bypass graft of native heart without angina pectoris  Instructions for follow-up, activity and diet:	Continue your medications. Do not stop Aspirin or Plavix unless instructed by your cardiologist.   No heavy lifting, strenuous activity, bending, straining or unnecessary stair climbing for 2 weeks.  Follow up with Cardiologist - Dr. Painting on 8/10/17 at 4pm  Secondary Diagnosis:	Essential hypertension  Instructions for follow-up, activity and diet:	Take medications for your blood pressure as recommended.  Eat a heart healthy diet that is low in saturated fats and salt, and includes whole grains, fruits, vegetables and lean protein   Exercise regularly (consult with your physician or cardiologist first); maintain a heart healthy weight.   If you smoke - quit (A resource to help you stop smoking is the [facility] – phone number 736-757-4832.). Continue to follow with your primary physician or cardiologist.   Seek medical help for dizziness, Lightheadedness, Blurry vision, Headache, Chest pain, Shortness of breath  Follow up with your medical doctor to establish long term blood pressure treatment goals.  Secondary Diagnosis:	Type 2 diabetes mellitus with other circulatory complication  Instructions for follow-up, activity and diet:	Your next appointment with PMD - Dr. Carty is on 9/12 at 1130pm  HgA1C this admission - 9.7  Make sure you get your HgA1c checked every three months.  If you take insulin, check your blood glucose before meals and at bedtime.  It's important not to skip any meals.  Keep a log of your blood glucose results and always take it with you to your doctor appointments.  Keep a list of your current medications including injectables and over the counter medications and bring this medication list with you to all your doctor appointments.  If you have not seen your ophthalmologist this year call for appointment.  Check your feet daily for redness, sores, or openings. Do not self treat. If no improvement in two days call your primary care physician for an appointment.  Low blood sugar (hypoglycemia) is a blood sugar below 70mg/dl. Check your blood sugar if you feel signs/symptoms of hypoglycemia. If your blood sugar is below 70 take 15 grams of carbohydrates (ex 4 oz of apple juice, 3-4 glucose tablets, or 4-6 oz of regular soda) wait 15 minutes and repeat blood sugar to make sure it comes up above 70.  If your blood sugar is above 70 and you are due for a meal, have a meal.  If you are not due for a meal have a snack.  This snack helps keeps your blood sugar at a safe range.  Secondary Diagnosis:	CKD (chronic kidney disease)  Instructions for follow-up, activity and diet:	Avoid taking (NSAIDs) - (ex: Ibuprofen, Advil, Celebrex, Naprosyn)  Avoid taking any nephrotoxic agents (can harm kidneys) - Intravenous contrast for diagnostic testing, combination cold medications.  Have all medications adjusted for your renal function by your Health Care Provider.  Blood pressure control is important.  Take all medication as prescribed.  Secondary Diagnosis:	Hypothyroidism, unspecified type  Instructions for follow-up, activity and diet:	you do not make enough thyroid hormone  signs & symptoms of low levels of thyroid hormone - tired, getting cold easily, coarse or thin hair, constipation, shortness of breath, swelling, irregular periods  your doctor will do thyroid hormone blood tests at least once a year to monitor if medication dose is adequate  take your thyroid medicine as directed by your doctor & on empty stomach  Secondary Diagnosis:	Gout  Instructions for follow-up, activity and diet:	Continue allopurinol Principal Discharge DX:	Atrial fibrillation with slow ventricular response  Goal:	Rate controlled  Instructions for follow-up, activity and diet:	Atrial fibrillation is the most common heart rhythm problem.  The condition puts you at risk for has stroke and heart attack  It helps if you control your blood pressure, not drink more than 1-2 alcohol drinks per day, cut down on caffeine, getting treatment for over active thyroid gland, and get regular exercise  Call your doctor if you feel your heart racing or beating unusually, chest tightness or pain, lightheaded, faint, shortness of breath especially with exercise  It is important to take your heart medication as prescribed  You are on a blood thinner - Eliquis  Follow up with Cardiologist - Dr. Painting on 8/10/17 at 4pm  Secondary Diagnosis:	Coronary artery disease involving coronary bypass graft of native heart without angina pectoris  Instructions for follow-up, activity and diet:	Continue your medications. Do not stop Aspirin or Plavix unless instructed by your cardiologist.   No heavy lifting, strenuous activity, bending, straining or unnecessary stair climbing for 2 weeks.  Follow up with Cardiologist - Dr. Painting on 8/10/17 at 4pm  Secondary Diagnosis:	Essential hypertension  Instructions for follow-up, activity and diet:	Take medications for your blood pressure as recommended.  Eat a heart healthy diet that is low in saturated fats and salt, and includes whole grains, fruits, vegetables and lean protein   Exercise regularly (consult with your physician or cardiologist first); maintain a heart healthy weight.   If you smoke - quit (A resource to help you stop smoking is the [facility] – phone number 295-618-2341.). Continue to follow with your primary physician or cardiologist.   Seek medical help for dizziness, Lightheadedness, Blurry vision, Headache, Chest pain, Shortness of breath  Follow up with your medical doctor to establish long term blood pressure treatment goals.  Secondary Diagnosis:	Type 2 diabetes mellitus with other circulatory complication  Instructions for follow-up, activity and diet:	Your next appointment with PMD - Dr. Carty is on 9/12 at 1130pm  HgA1C this admission - 9.7  Make sure you get your HgA1c checked every three months.  If you take insulin, check your blood glucose before meals and at bedtime.  It's important not to skip any meals.  Keep a log of your blood glucose results and always take it with you to your doctor appointments.  Keep a list of your current medications including injectables and over the counter medications and bring this medication list with you to all your doctor appointments.  If you have not seen your ophthalmologist this year call for appointment.  Check your feet daily for redness, sores, or openings. Do not self treat. If no improvement in two days call your primary care physician for an appointment.  Low blood sugar (hypoglycemia) is a blood sugar below 70mg/dl. Check your blood sugar if you feel signs/symptoms of hypoglycemia. If your blood sugar is below 70 take 15 grams of carbohydrates (ex 4 oz of apple juice, 3-4 glucose tablets, or 4-6 oz of regular soda) wait 15 minutes and repeat blood sugar to make sure it comes up above 70.  If your blood sugar is above 70 and you are due for a meal, have a meal.  If you are not due for a meal have a snack.  This snack helps keeps your blood sugar at a safe range.  Secondary Diagnosis:	CKD (chronic kidney disease)  Instructions for follow-up, activity and diet:	Avoid taking (NSAIDs) - (ex: Ibuprofen, Advil, Celebrex, Naprosyn)  Avoid taking any nephrotoxic agents (can harm kidneys) - Intravenous contrast for diagnostic testing, combination cold medications.  Have all medications adjusted for your renal function by your Health Care Provider.  Blood pressure control is important.  Take all medication as prescribed.  Secondary Diagnosis:	Hypothyroidism, unspecified type  Instructions for follow-up, activity and diet:	you do not make enough thyroid hormone  signs & symptoms of low levels of thyroid hormone - tired, getting cold easily, coarse or thin hair, constipation, shortness of breath, swelling, irregular periods  your doctor will do thyroid hormone blood tests at least once a year to monitor if medication dose is adequate  take your thyroid medicine as directed by your doctor & on empty stomach  Secondary Diagnosis:	Gout  Instructions for follow-up, activity and diet:	Continue allopurinol

## 2017-08-08 NOTE — DISCHARGE NOTE ADULT - MEDICATION SUMMARY - MEDICATIONS TO TAKE
I will START or STAY ON the medications listed below when I get home from the hospital:    spironolactone 25 mg oral tablet  -- 1 tab(s) by mouth once a day  -- Indication: For Blood pressure control    Aspirin Enteric Coated 81 mg oral delayed release tablet  -- 1 tab(s) by mouth once a day  -- Indication: For coronary artery disease    ramipril 2.5 mg oral capsule  -- 1 cap(s) by mouth once a day  -- Indication: For Blood pressure control    apixaban 5 mg oral tablet  -- 1 tab(s) by mouth every 12 hours  -- Indication: For Atrial fibrillation - blood thinner    glimepiride 4 mg oral tablet  -- 1 tab(s) by mouth once a day  -- Indication: For Blood sugar control    Levemir 100 units/mL subcutaneous solution  -- 40 unit(s) subcutaneous once a day (at bedtime)  -- Indication: For Blood sugar control    insulin lispro 100 units/mL subcutaneous solution  -- 7 unit(s) subcutaneous once a day (in the morning) with Breakfast  -- Indication: For Blood sugar control    insulin lispro 100 units/mL subcutaneous solution  -- 5 unit(s) subcutaneous once a day (in the evening) for dinner  -- Indication: For Blood sugar control    allopurinol 100 mg oral tablet  -- 100 milligram(s) by mouth once a day  -- Indication: For Gout    atorvastatin 80 mg oral tablet  -- 1 tab(s) by mouth once a day  -- Indication: For Cholesterol    clopidogrel 75 mg oral tablet  -- 1 tab(s) by mouth once a day  -- Indication: For Coronary artery disease    metoprolol tartrate 25 mg oral tablet  -- 1 tab(s) by mouth 3 times a day  -- Indication: For Heart rate control     furosemide 40 mg oral tablet  -- 1 tab(s) by mouth once a day  -- Indication: For Blood pressure control    levothyroxine 100 mcg (0.1 mg) oral capsule  -- 1 cap(s) by mouth once a day  -- Indication: For Low thyroid function

## 2017-08-08 NOTE — DISCHARGE NOTE ADULT - PATIENT PORTAL LINK FT
“You can access the [facility] Patient Portal, offered by [facility], by registering with the following website: [email or web address]”

## 2017-08-08 NOTE — DISCHARGE NOTE ADULT - MEDICATION SUMMARY - MEDICATIONS TO STOP TAKING
I will STOP taking the medications listed below when I get home from the hospital:    acetaminophen 325 mg oral tablet  -- 2 tab(s) by mouth every 6 hours, As needed, Mild Pain (1 - 3)    Brilinta  --  by mouth

## 2017-08-08 NOTE — CHART NOTE - NSCHARTNOTEFT_GEN_A_CORE
Request from Dr. Ortiz to facilitate patient discharge. Medication reconciliation reviewed, revised, and resolved with Dr. Ortiz who had medically cleared patient for discharge with follow-up as advised. Please refer to discharge note for detailed hospital course. Patient is currently stable for discharge to home at this time.    Contact # [phone]

## 2017-08-08 NOTE — DISCHARGE NOTE ADULT - HOSPITAL COURSE
CP, RESOLVED   H/O CAD. MLPLE  STENTS    IN E R, IN AFIB, ON ELIQUIS  NOW    S/P PAUSES,  LOPRESSOR DECREASED DOING WELL, D C TODAY   CARD. 1 WEEK

## 2017-08-08 NOTE — DISCHARGE NOTE ADULT - CARE PROVIDER_API CALL
Lamont Carty (MD), Cardiovascular Disease; Internal Medicine  [address]  [place], [place] [id number]  Phone: [phone]  Fax: [phone]    Lam Painting (MD), Cardiovascular Disease; Internal Medicine  [address]  [place] [address]  Phone: [phone]  Fax: [phone]

## 2017-08-08 NOTE — DISCHARGE NOTE ADULT - ADDITIONAL INSTRUCTIONS
Follow up with Cardiologist - Dr. Painting on 8/10/17 at 4pm  Your next appointment with PMD - Dr. Carty is on 9/12 at 1130pm

## 2017-08-08 NOTE — PROGRESS NOTE ADULT - SUBJECTIVE AND OBJECTIVE BOX
E L E C T [facility]  [facility]     PROGRESS  NOTE   ________________________________________________    CHIEF COMPLAINT:Patient is a 82y old  Female who presents with a chief complaint of cp (08 Aug 2017 08:34)  doing well with no complain.  	  REVIEW OF SYSTEMS:  CONSTITUTIONAL: No fever, weight loss, or fatigue  EYES: No eye pain, visual disturbances, or discharge  ENT:  No difficulty hearing, tinnitus, vertigo; No sinus or throat pain  NECK: No pain or stiffness  RESPIRATORY: No cough, wheezing, chills or hemoptysis; No Shortness of Breath  CARDIOVASCULAR: No chest pain, palpitations, passing out, dizziness, or leg swelling  GASTROINTESTINAL: No abdominal or epigastric pain. No nausea, vomiting, or hematemesis; No diarrhea or constipation. No melena or hematochezia.  GENITOURINARY: No dysuria, frequency, hematuria, or incontinence  NEUROLOGICAL: No headaches, memory loss, loss of strength, numbness, or tremors  SKIN: No itching, burning, rashes, or lesions   LYMPH Nodes: No enlarged glands  ENDOCRINE: No heat or cold intolerance; No hair loss  MUSCULOSKELETAL: No joint pain or swelling; No muscle, back, or extremity pain  PSYCHIATRIC: No depression, anxiety, mood swings, or difficulty sleeping  HEME/LYMPH: No easy bruising, or bleeding gums  ALLERGY AND IMMUNOLOGIC: No hives or eczema	    [ ] All others negative	  [ ] Unable to obtain    PHYSICAL EXAM:  T(C): 36.8 (08-08-17 @ 04:06), Max: 36.8 (08-08-17 @ 04:06)  HR: 61 (08-08-17 @ 05:46) (61 - 78)  BP: 111/70 (08-08-17 @ 05:46) (103/60 - 123/67)  RR: 18 (08-08-17 @ 04:06) (17 - 18)  SpO2: 95% (08-08-17 @ 04:06) (94% - 96%)  Wt(kg): --  I&O's Summary    07 Aug 2017 07:01  -  08 Aug 2017 07:00  --------------------------------------------------------  IN: 1510 mL / OUT: 0 mL / NET: 1510 mL        Appearance: Normal	  HEENT:   Normal oral mucosa, PERRL, EOMI	  Lymphatic: No lymphadenopathy  Cardiovascular: Normal S1 S2, No JVD, +systolic murmurs, No edema  Respiratory: Lungs clear to auscultation	  Psychiatry: A & O x 3, Mood & affect appropriate  Gastrointestinal:  Soft, Non-tender, + BS	  Skin: No rashes, No ecchymoses, No cyanosis	  Neurologic: Non-focal  Extremities: Normal range of motion, No clubbing, cyanosis or edema  Vascular: Peripheral pulses palpable 2+ bilaterally    MEDICATIONS  (STANDING):  spironolactone 25 milliGRAM(s) Oral daily  aspirin enteric coated 81 milliGRAM(s) Oral daily  lisinopril 5 milliGRAM(s) Oral daily  allopurinol 100 milliGRAM(s) Oral daily  atorvastatin 80 milliGRAM(s) Oral at bedtime  furosemide    Tablet 40 milliGRAM(s) Oral daily  levothyroxine 100 MICROGram(s) Oral daily  clopidogrel Tablet 75 milliGRAM(s) Oral daily  insulin lispro (HumaLOG) corrective regimen sliding scale   SubCutaneous three times a day before meals  dextrose 5%. 1000 milliLiter(s) (50 mL/Hr) IV Continuous <Continuous>  dextrose 50% Injectable 12.5 Gram(s) IV Push once  dextrose 50% Injectable 25 Gram(s) IV Push once  dextrose 50% Injectable 25 Gram(s) IV Push once  zolpidem 5 milliGRAM(s) Oral at bedtime  insulin glargine Injectable (LANTUS) 40 Unit(s) SubCutaneous at bedtime  apixaban 5 milliGRAM(s) Oral every 12 hours  metoprolol 25 milliGRAM(s) Oral three times a day  insulin lispro Injectable (HumaLOG) 7 Unit(s) SubCutaneous before breakfast  insulin lispro (HumaLOG) corrective regimen sliding scale   SubCutaneous at bedtime  insulin lispro Injectable (HumaLOG) 5 Unit(s) SubCutaneous before dinner      TELEMETRY:nsr 60/80 	    ECG:  	  RADIOLOGY:  OTHER: 	  	  LABS:	 	    CARDIAC MARKERS:            08-08    134<L>  |  96  |  32<H>  ----------------------------<  103<H>  4.6   |  24  |  1.24    Ca    9.2      08 Aug 2017 08:21      proBNP: Serum Pro-Brain Natriuretic Peptide: 446 pg/mL (08-05 @ 11:42)  Serum Pro-Brain Natriuretic Peptide: 400 pg/mL (08-02 @ 15:01)    Lipid Profile:   HgA1c: Hemoglobin A1C, Whole Blood: 9.7 % (08-06 @ 08:49)    TSH: Thyroid Stimulating Hormone, Serum: 5.81 uIU/mL (08-05 @ 17:17)
HPI:  83 yo female HTN, DM ASHD s/p multiple stents, s/p brachytherapy most recent stent last week to OM, s/p MV repair admitted with new onset afib (converted to RSR yesterday) and sob (likely Brilinta reaction).  Now doing well without cp or sob.  PAST MEDICAL & SURGICAL HISTORY:  CKD (chronic kidney disease) stage 3, GFR 30-59 ml/min  Chronic heart failure, unspecified heart failure type  Gout  Valvular heart disease  Hypothyroid  Coronary artery disease  HTN (hypertension)  Diabetes  H/O mitral valve repair  H/O aortic valve repair  S/P CABG x 3  H/O thyroidectomy  History of cholecystectomy      Allergies    Lidocaine Hydrochloride (Other)    Intolerances  Brilinta        MEDICATIONS  (STANDING):  spironolactone 25 milliGRAM(s) Oral daily  aspirin enteric coated 81 milliGRAM(s) Oral daily  lisinopril 5 milliGRAM(s) Oral daily  allopurinol 100 milliGRAM(s) Oral daily  atorvastatin 80 milliGRAM(s) Oral at bedtime  furosemide    Tablet 40 milliGRAM(s) Oral daily  levothyroxine 100 MICROGram(s) Oral daily  clopidogrel Tablet 75 milliGRAM(s) Oral daily  insulin lispro (HumaLOG) corrective regimen sliding scale   SubCutaneous three times a day before meals  dextrose 5%. 1000 milliLiter(s) (50 mL/Hr) IV Continuous <Continuous>  dextrose 50% Injectable 12.5 Gram(s) IV Push once  dextrose 50% Injectable 25 Gram(s) IV Push once  dextrose 50% Injectable 25 Gram(s) IV Push once  zolpidem 5 milliGRAM(s) Oral at bedtime  insulin glargine Injectable (LANTUS) 40 Unit(s) SubCutaneous at bedtime  apixaban 5 milliGRAM(s) Oral every 12 hours  metoprolol 25 milliGRAM(s) Oral three times a day  insulin lispro Injectable (HumaLOG) 7 Unit(s) SubCutaneous before breakfast  insulin lispro (HumaLOG) corrective regimen sliding scale   SubCutaneous at bedtime  insulin lispro Injectable (HumaLOG) 5 Unit(s) SubCutaneous before dinner    MEDICATIONS  (PRN):  dextrose Gel 1 Dose(s) Oral once PRN Blood Glucose LESS THAN 70 milliGRAM(s)/deciliter  glucagon  Injectable 1 milliGRAM(s) IntraMuscular once PRN Glucose LESS THAN 70 milligrams/deciliter  acetaminophen   Tablet. 650 milliGRAM(s) Oral every 6 hours PRN Mild Pain (1 - 3)            Vital Signs Last 24 Hrs  T(C): 36.8 (08 Aug 2017 04:06), Max: 36.8 (08 Aug 2017 04:06)  T(F): 98.3 (08 Aug 2017 04:06), Max: 98.3 (08 Aug 2017 04:06)  HR: 61 (08 Aug 2017 05:46) (61 - 80)  BP: 111/70 (08 Aug 2017 05:46) (103/60 - 123/67)  BP(mean): --  RR: 18 (08 Aug 2017 04:06) (17 - 18)  SpO2: 95% (08 Aug 2017 04:06) (94% - 96%)  Daily     Daily Weight in k.7 (08 Aug 2017 08:34)  I&O's Detail    07 Aug 2017 07:01  -  08 Aug 2017 07:00  --------------------------------------------------------  IN:    Oral Fluid: 1510 mL  Total IN: 1510 mL    OUT:  Total OUT: 0 mL    Total NET: 1510 mL          Physical Exam  Pt without complaint NAD  Neck without JVD  Lungs clear   Cor s1s2 2/6 dania rusb  Abd soft  Ext without edema  Pulses +2 DP  LABS            08    134<L>  |  96  |  32<H>  ----------------------------<  103<H>  4.6   |  24  |  1.24    Ca    9.2      08 Aug 2017 08:21      EKG:    < from: 12 Lead ECG (17 @ 13:51) >  Ventricular Rate 52 BPM    Atrial Rate 300 BPM    QRS Duration 90 ms     ms    QTc 446 ms    R Axis -8 degrees    T Axis -74 degrees    Diagnosis Line ATRIAL FIBRILLATION WITH SLOW VENTRICULAR RESPONSE  ST & T WAVE ABNORMALITY, CONSIDER INFERIOR ISCHEMIA OR DIGITALIS EFFECT    CM RSR 60's to 80's short run trigeminy  CXR:  < from: Xray Chest 1 View AP- PORTABLE-Urgent (17 @ 11:50) >  EXAM:  CHEST-PORTABLE URGENT                            PROCEDURE DATE:  2017      INTERPRETATION:  AP chest with comparison to 2017.    Clinical indications: Shortness of breath. Chest pain.    IMPRESSION: The heart is normal in size. The patient is status post   median sternotomy. The lungs are clear.      Echo  < from: Transthoracic Echocardiogram (17 @ 18:47) >  ------------------------------------------------------------------------  PROCEDURE: Transthoracic echocardiogram with 2-D, M-Mode  and complete spectral and color flow Doppler.  INDICATION: Shortness of breath (R06.02)  ------------------------------------------------------------------------  Dimensions:    Normal Values:  LA:     3.6    2.0 - 4.0 cm  Ao:     4.0    2.0 - 3.8 cm  SEPTUM: 1.0    0.6 - 1.2 cm  PWT:    1.0    0.6 - 1.1 cm  LVIDd:  4.2    3.0 - 5.6 cm  LVIDs:  3.0    1.8 - 4.0 cm  Derived variables:  LVMI: 73 g/m2  RWT: 0.47  Fractional short: 29 %  EF (Visual Estimate): 65 %  Doppler Peak Velocity (m/sec): MV=2.1 AoV=1.3 TV=0.9  ------------------------------------------------------------------------  Observations:  Mitral Valve: An annuloplasty ring is seen in the mitral  position. The ring and mitral valve leaflets are not well  visualized. Minimal mitral regurgitation. Peak mitral valve  gradient [id number] mm Hg, mean transmitral valve gradient  equals 7-9 mm Hg, which is elevated even in the setting of  a repaired valve. HR 80bpm.  Aortic Valve/Aorta: Aortic valve not well visualized;  appears calcified. Peak transaortic valve gradient equals 7  mmHg, mean transaortic valve gradient equals 5 mm Hg,  aortic valve velocity time integral equals 28 cm. Mild  aortic regurgitation.  Upper limits of normal aortic root size for BSA.  (Ao: 4.0  cm at the sinuses of Valsalva).  Left Atrium: Normal left atrium.  Left Ventricle: Endocardium not well visualized; grossly  normal left ventricular systolic function. Normal left  ventricular internal dimensions and wall thicknesses.  Right Heart: Normal right atrium. Normal right ventricular  size and function. An annuloplasty ring is seen in the  tricuspid position. Pulmonic valve not well visualized.  Pericardium/Pleura: Normal pericardium with no pericardial  effusion.  Hemodynamic: Estimated right atrial pressure is 8 mm Hg.  ------------------------------------------------------------------------  Conclusions:  1. An annuloplasty ring is seen in the mitral position. The  ring and mitral valve leaflets are not well visualized.  Minimal mitral regurgitation. Peak mitral valve gradient  equals 18mm Hg, mean transmitral valve gradient equals 7-9  mm Hg, which is elevated even in the setting of a repaired  valve. HR 80bpm.  2. Upper limits of normal aortic root size for BSA.  (Ao:  4.0 cm at the sinuses of Valsalva).  3. Normal left ventricular internal dimensions and wall  thicknesses.  4. Endocardium not well visualized; grossly normal left  ventricular systolic function.  5. Normal right ventricular size and function.  Elevated mitral valve gradients in setting of a repaired  valve. Consider JAS for further evaluation if clinically  indicated.  *** No previous Echo exam.  ------------------------------------------------------------------------  Confirmed on  2017 - 15:49:54 by Bettye Dodson M.D.  --------------------------------------------------------  Assessment and Plan:  83 yo female HTN, DM ASHD s/p multiple stents, s/p brachytherapy most recent stent last week to OM, s/p MV repair admitted with new onset afib (converted to RSR yesterday) and sob (likely Brilinta reaction).   Agree with current rx.  Will require triple therapy for 3 months  Follow up in office this week
Patient is a 82y old  Female who presents with a chief complaint of cp (05 Aug 2017 18:40)  Today the patient feels well.  Little lightheaded when getting up and walking.  Her HR and BP are a bit low.  No CP or SOB.  Ambulating.  Scheduled for TTE.  On Metoprolol 50mg BID.  I suggest lowering it to 50mg once a day.  If she feels ok, she can be discharged to home later today.    Allergies:   Lidocaine Hydrochloride (Other)      MEDICATIONS  (STANDING):  spironolactone 25 milliGRAM(s) Oral daily  aspirin enteric coated 81 milliGRAM(s) Oral daily  lisinopril 5 milliGRAM(s) Oral daily  insulin lispro Injectable (HumaLOG) 4 Unit(s) SubCutaneous two times a day with meals  allopurinol 100 milliGRAM(s) Oral daily  atorvastatin 80 milliGRAM(s) Oral at bedtime  furosemide    Tablet 40 milliGRAM(s) Oral daily  levothyroxine 100 MICROGram(s) Oral daily  clopidogrel Tablet 75 milliGRAM(s) Oral daily  insulin lispro (HumaLOG) corrective regimen sliding scale   SubCutaneous three times a day before meals  dextrose 5%. 1000 milliLiter(s) (50 mL/Hr) IV Continuous <Continuous>  dextrose 50% Injectable 12.5 Gram(s) IV Push once  dextrose 50% Injectable 25 Gram(s) IV Push once  dextrose 50% Injectable 25 Gram(s) IV Push once  zolpidem 5 milliGRAM(s) Oral at bedtime  insulin glargine Injectable (LANTUS) 40 Unit(s) SubCutaneous at bedtime  apixaban 5 milliGRAM(s) Oral every 12 hours  metoprolol 25 milliGRAM(s) Oral three times a day    MEDICATIONS  (PRN):  dextrose Gel 1 Dose(s) Oral once PRN Blood Glucose LESS THAN 70 milliGRAM(s)/deciliter  glucagon  Injectable 1 milliGRAM(s) IntraMuscular once PRN Glucose LESS THAN 70 milligrams/deciliter  acetaminophen   Tablet. 650 milliGRAM(s) Oral every 6 hours PRN Mild Pain (1 - 3)      ROS:     A comprehensive review of systems was performed and pertinent items are noted in the history above. A detailed ROS is as follows:    unremarkable except as in HPI    Daily     Daily Weight in k.5 (07 Aug 2017 07:15)  Drug Dosing Weight  Height (cm): 165.1 (05 Aug 2017 20:23)  Weight (kg): 82.6 (05 Aug 2017 20:23)  BMI (kg/m2): 30.3 (05 Aug 2017 20:23)  BSA (m2): 1.9 (05 Aug 2017 20:23)  Vital Signs Last 24 Hrs  T(C): 37.3 (07 Aug 2017 04:16), Max: 37.3 (07 Aug 2017 04:16)  T(F): 99.1 (07 Aug 2017 04:16), Max: 99.1 (07 Aug 2017 04:16)  HR: 80 (07 Aug 2017 09:09) (56 - 80)  BP: 120/73 (07 Aug 2017 09:09) (99/68 - 120/73)  BP(mean): --  RR: 18 (07 Aug 2017 04:16) (18 - 18)  SpO2: 96% (07 Aug 2017 04:16) (95% - 96%)    I&O's Summary    06 Aug 2017 07:01  -  07 Aug 2017 07:00  --------------------------------------------------------  IN: 945 mL / OUT: 0 mL / NET: 945 mL        PHYSICAL EXAM:    General Appearance:    Comfortable, AAO X 3, in no distress.   HEENT:                       Atraumatic, PERRLA, EOMI, conjunctiva clear.   Neck:                          Supple, no adenopathy, no thyromegaly, no JVD, no carotid bruit  Back:                          Symmetric, no CV angle fullness or tenderness, no soft tissue tenderness.  Chest:                         Clear to auscultation, no wheezes, rales or rhonchi, symmetric air entry, no tachypnea, retractions or cyanosis  Heart:                          S1, S2 normal, no gallop, no murmur.  Abdomen:                    Soft, non-tender, bowel sounds active. No palpable masses.   Extremities:                 no cyanosis, no edema, no joint swelling. Peripheral pulses normal  Skin:                           Skin color, texture normal. No rashes   Neurologic:                  Alert and oriented X3, cranial nerves intact, sensory and motor normal.      INTERPRETATION OF TELEMETRY: AFIB with controlled VR and slow VR in the 40s    12 Lead ECG (17 @ 13:51) >  Ventricular Rate 52 BPM    Atrial Rate 300 BPM    QRS Duration 90 ms     ms    QTc 446 ms    R Axis -8 degrees    T Axis -74 degrees    Diagnosis Line ATRIAL FIBRILLATION WITH SLOW VENTRICULAR RESPONSE  ST & T WAVE ABNORMALITY, CONSIDER INFERIOR ISCHEMIA OR DIGITALIS EFFECT          LABS:                        13.8   6.92  )-----------( 181      ( 06 Aug 2017 08:49 )             43.2     08-    137  |  102  |  28<H>  ----------------------------<  122<H>  4.7   |  23  |  1.38<H>    Ca    9.2      07 Aug 2017 08:22  Phos  2.9       Mg     2.1         TPro  7.7  /  Alb  4.4  /  TBili  0.5  /  DBili  x   /  AST  29  /  ALT  38  /  AlkPhos  63   @ 15:01  Trop-I --  CK     150  CK MB  --    Pro BNP  --  @ 11:43  D Dimer  161  @ 11:43  Pro BNP  446  @ 11:42  D Dimer  --  @ 11:42  Pro BNP  400  @ 15:01  D Dimer  --  @ 15:01    PT/INR - ( 05 Aug 2017 11:43 )   PT: 10.3 sec;   INR: 0.95 ratio         PTT - ( 05 Aug 2017 11:43 )  PTT:33.0 sec          RADIOLOGY & ADDITIONAL STUDIES:    HEALTH ISSUES - PROBLEM Dx:  Shortness of breath: Shortness of breath
SUBJECTIVE / OVERNIGHT EVENTS: No nausea, vomiting or diarrhea, no fever or chills, no dizziness or chest pain, no dysuria or hematuria .      CAPILLARY BLOOD GLUCOSE  126 (07 Aug 2017 07:15)  213 (06 Aug 2017 21:05)  132 (06 Aug 2017 16:48)  294 (06 Aug 2017 11:28)  238 (06 Aug 2017 07:56)        I&O's Summary    06 Aug 2017 07:01  -  07 Aug 2017 07:00  --------------------------------------------------------  IN: 945 mL / OUT: 0 mL / NET: 945 mL        PHYSICAL EXAM:  HEAD:  Atraumatic, Normocephalic  EYES: EOMI, PERRLA, conjunctiva and sclera clear  NECK: Supple, No JVD  CHEST/LUNG: Clear to auscultation bilaterally; No wheeze  HEART: Regular rate and rhythm; No murmurs, rubs, or gallops  ABDOMEN: Soft, Nontender, Nondistended; Bowel sounds present  EXTREMITIES:  2+ Peripheral Pulses, No clubbing, cyanosis, or edema  PSYCH: AAOx3  NEUROLOGY: non-focal  SKIN: No rashes or lesions    MEDICATIONS  (STANDING):  spironolactone 25 milliGRAM(s) Oral daily  aspirin enteric coated 81 milliGRAM(s) Oral daily  lisinopril 5 milliGRAM(s) Oral daily  insulin lispro Injectable (HumaLOG) 4 Unit(s) SubCutaneous two times a day with meals  allopurinol 100 milliGRAM(s) Oral daily  atorvastatin 80 milliGRAM(s) Oral at bedtime  furosemide    Tablet 40 milliGRAM(s) Oral daily  levothyroxine 100 MICROGram(s) Oral daily  clopidogrel Tablet 75 milliGRAM(s) Oral daily  insulin lispro (HumaLOG) corrective regimen sliding scale   SubCutaneous three times a day before meals  dextrose 5%. 1000 milliLiter(s) (50 mL/Hr) IV Continuous <Continuous>  dextrose 50% Injectable 12.5 Gram(s) IV Push once  dextrose 50% Injectable 25 Gram(s) IV Push once  dextrose 50% Injectable 25 Gram(s) IV Push once  zolpidem 5 milliGRAM(s) Oral at bedtime  insulin glargine Injectable (LANTUS) 40 Unit(s) SubCutaneous at bedtime  metoprolol 50 milliGRAM(s) Oral two times a day  apixaban 5 milliGRAM(s) Oral every 12 hours    MEDICATIONS  (PRN):  dextrose Gel 1 Dose(s) Oral once PRN Blood Glucose LESS THAN 70 milliGRAM(s)/deciliter  glucagon  Injectable 1 milliGRAM(s) IntraMuscular once PRN Glucose LESS THAN 70 milligrams/deciliter  acetaminophen   Tablet. 650 milliGRAM(s) Oral every 6 hours PRN Mild Pain (1 - 3)      LABS:                        13.8   6.92  )-----------( 181      ( 06 Aug 2017 08:49 )             43.2     08-06    137  |  101  |  33<H>  ----------------------------<  145<H>  4.9   |  20<L>  |  1.35<H>    Ca    9.2      06 Aug 2017 08:39  Phos  2.9     08-05  Mg     2.1     08-05    TPro  7.7  /  Alb  4.4  /  TBili  0.5  /  DBili  x   /  AST  29  /  ALT  38  /  AlkPhos  63  08-05    PT/INR - ( 05 Aug 2017 11:43 )   PT: 10.3 sec;   INR: 0.95 ratio         PTT - ( 05 Aug 2017 11:43 )  PTT:33.0 sec  CARDIAC MARKERS ( 05 Aug 2017 20:24 )  x     / 0.01 ng/mL / x     / x     / x      CARDIAC MARKERS ( 05 Aug 2017 16:59 )  x     / <0.01 ng/mL / x     / x     / x      CARDIAC MARKERS ( 05 Aug 2017 11:42 )  x     / <0.01 ng/mL / x     / x     / x                08-05 @ 12:26  4.7  34    Hemoglobin A1C, Whole Blood: 9.7 % (08-06 @ 08:49)    Thyroid Stimulating Hormone, Serum: 5.81 uIU/mL (08-05 @ 17:17)      Cultures:    EKG:    Radiological Studies:    Consultant(s) Notes Reviewed:      Care Discussed with Consultants/Other Providers:
SUBJECTIVE / OVERNIGHT EVENTS: No nausea, vomiting or diarrhea, no fever or chills, no dizziness or chest pain, no dysuria or hematuria .      CAPILLARY BLOOD GLUCOSE  238 (06 Aug 2017 07:56)  324 (05 Aug 2017 21:24)        I&O's Summary    05 Aug 2017 07:01  -  06 Aug 2017 07:00  --------------------------------------------------------  IN: 220 mL / OUT: 0 mL / NET: 220 mL        PHYSICAL EXAM:  HEAD:  Atraumatic, Normocephalic  EYES: EOMI, PERRLA, conjunctiva and sclera clear  NECK: Supple, No JVD  CHEST/LUNG: Clear to auscultation bilaterally; No wheeze  HEART: Regular rate and rhythm; No murmurs, rubs, or gallops  ABDOMEN: Soft, Nontender, Nondistended; Bowel sounds present  EXTREMITIES:  2+ Peripheral Pulses, No clubbing, cyanosis, or edema  PSYCH: AAOx3  NEUROLOGY: non-focal  SKIN: No rashes or lesions    MEDICATIONS  (STANDING):  spironolactone 25 milliGRAM(s) Oral daily  aspirin enteric coated 81 milliGRAM(s) Oral daily  lisinopril 5 milliGRAM(s) Oral daily  insulin lispro Injectable (HumaLOG) 4 Unit(s) SubCutaneous two times a day with meals  allopurinol 100 milliGRAM(s) Oral daily  atorvastatin 80 milliGRAM(s) Oral at bedtime  furosemide    Tablet 40 milliGRAM(s) Oral daily  levothyroxine 100 MICROGram(s) Oral daily  clopidogrel Tablet 75 milliGRAM(s) Oral daily  insulin lispro (HumaLOG) corrective regimen sliding scale   SubCutaneous three times a day before meals  dextrose 5%. 1000 milliLiter(s) (50 mL/Hr) IV Continuous <Continuous>  dextrose 50% Injectable 12.5 Gram(s) IV Push once  dextrose 50% Injectable 25 Gram(s) IV Push once  dextrose 50% Injectable 25 Gram(s) IV Push once  enoxaparin Injectable 80 milliGRAM(s) SubCutaneous two times a day  metoprolol 75 milliGRAM(s) Oral two times a day  zolpidem 5 milliGRAM(s) Oral at bedtime  insulin glargine Injectable (LANTUS) 40 Unit(s) SubCutaneous at bedtime    MEDICATIONS  (PRN):  dextrose Gel 1 Dose(s) Oral once PRN Blood Glucose LESS THAN 70 milliGRAM(s)/deciliter  glucagon  Injectable 1 milliGRAM(s) IntraMuscular once PRN Glucose LESS THAN 70 milligrams/deciliter  acetaminophen   Tablet. 650 milliGRAM(s) Oral every 6 hours PRN Mild Pain (1 - 3)      LABS:                        14.0   7.9   )-----------( 173      ( 05 Aug 2017 11:43 )             43.9     08-05    135  |  98  |  28<H>  ----------------------------<  203<H>  4.7   |  22  |  1.46<H>    Ca    9.2      05 Aug 2017 11:42  Phos  2.9     08-05  Mg     2.1     08-05    TPro  7.7  /  Alb  4.4  /  TBili  0.5  /  DBili  x   /  AST  29  /  ALT  38  /  AlkPhos  63  08-05    PT/INR - ( 05 Aug 2017 11:43 )   PT: 10.3 sec;   INR: 0.95 ratio         PTT - ( 05 Aug 2017 11:43 )  PTT:33.0 sec  CARDIAC MARKERS ( 05 Aug 2017 20:24 )  x     / 0.01 ng/mL / x     / x     / x      CARDIAC MARKERS ( 05 Aug 2017 16:59 )  x     / <0.01 ng/mL / x     / x     / x      CARDIAC MARKERS ( 05 Aug 2017 11:42 )  x     / <0.01 ng/mL / x     / x     / x                08-05 @ 12:26  4.7  34      Thyroid Stimulating Hormone, Serum: 5.81 uIU/mL (08-05 @ 17:17)      Cultures:    EKG:    Radiological Studies:    Consultant(s) Notes Reviewed:      Care Discussed with Consultants/Other Providers:
SUBJECTIVE / OVERNIGHT EVENTS: No nausea, vomiting or diarrhea, no fever or chills, no dizziness or chest pain, no dysuria or hematuria .      CAPILLARY BLOOD GLUCOSE  93 (08 Aug 2017 07:10)  197 (07 Aug 2017 21:18)  127 (07 Aug 2017 16:39)  263 (07 Aug 2017 11:11)        I&O's Summary    07 Aug 2017 07:01  -  08 Aug 2017 07:00  --------------------------------------------------------  IN: 1510 mL / OUT: 0 mL / NET: 1510 mL        PHYSICAL EXAM:  HEAD:  Atraumatic, Normocephalic  EYES: EOMI, PERRLA, conjunctiva and sclera clear  NECK: Supple, No JVD  CHEST/LUNG: Clear to auscultation bilaterally; No wheeze  HEART: Regular rate and rhythm; No murmurs, rubs, or gallops  ABDOMEN: Soft, Nontender, Nondistended; Bowel sounds present  EXTREMITIES:  2+ Peripheral Pulses, No clubbing, cyanosis, or edema  PSYCH: AAOx3  NEUROLOGY: non-focal  SKIN: No rashes or lesions    MEDICATIONS  (STANDING):  spironolactone 25 milliGRAM(s) Oral daily  aspirin enteric coated 81 milliGRAM(s) Oral daily  lisinopril 5 milliGRAM(s) Oral daily  allopurinol 100 milliGRAM(s) Oral daily  atorvastatin 80 milliGRAM(s) Oral at bedtime  furosemide    Tablet 40 milliGRAM(s) Oral daily  levothyroxine 100 MICROGram(s) Oral daily  clopidogrel Tablet 75 milliGRAM(s) Oral daily  insulin lispro (HumaLOG) corrective regimen sliding scale   SubCutaneous three times a day before meals  dextrose 5%. 1000 milliLiter(s) (50 mL/Hr) IV Continuous <Continuous>  dextrose 50% Injectable 12.5 Gram(s) IV Push once  dextrose 50% Injectable 25 Gram(s) IV Push once  dextrose 50% Injectable 25 Gram(s) IV Push once  zolpidem 5 milliGRAM(s) Oral at bedtime  insulin glargine Injectable (LANTUS) 40 Unit(s) SubCutaneous at bedtime  apixaban 5 milliGRAM(s) Oral every 12 hours  metoprolol 25 milliGRAM(s) Oral three times a day  insulin lispro Injectable (HumaLOG) 7 Unit(s) SubCutaneous before breakfast  insulin lispro (HumaLOG) corrective regimen sliding scale   SubCutaneous at bedtime  insulin lispro Injectable (HumaLOG) 5 Unit(s) SubCutaneous before dinner    MEDICATIONS  (PRN):  dextrose Gel 1 Dose(s) Oral once PRN Blood Glucose LESS THAN 70 milliGRAM(s)/deciliter  glucagon  Injectable 1 milliGRAM(s) IntraMuscular once PRN Glucose LESS THAN 70 milligrams/deciliter  acetaminophen   Tablet. 650 milliGRAM(s) Oral every 6 hours PRN Mild Pain (1 - 3)      LABS:                        13.8   6.92  )-----------( 181      ( 06 Aug 2017 08:49 )             43.2     08-07    137  |  102  |  28<H>  ----------------------------<  122<H>  4.7   |  23  |  1.38<H>    Ca    9.2      07 Aug 2017 08:22                    Hemoglobin A1C, Whole Blood: 9.7 % (08-06 @ 08:49)    Thyroid Stimulating Hormone, Serum: 5.81 uIU/mL (08-05 @ 17:17)      Cultures:    EKG:    Radiological Studies:    Consultant(s) Notes Reviewed:      Care Discussed with Consultants/Other Providers:
E L E C T R O  P H Y S I O L O G Y     PROGRESS  NOTE   ________________________________________________    CHIEF COMPLAINT:Patient is a 82y old  Female who presents with a chief complaint of cp (05 Aug 2017 18:40)  found in a.fib with bradycardia.    	  REVIEW OF SYSTEMS:  CONSTITUTIONAL: No fever, weight loss, or fatigue  EYES: No eye pain, visual disturbances, or discharge  ENT:  No difficulty hearing, tinnitus, vertigo; No sinus or throat pain  NECK: No pain or stiffness  RESPIRATORY: No cough, wheezing, chills or hemoptysis; No Shortness of Breath  CARDIOVASCULAR: No chest pain, palpitations, passing out, dizziness, or leg swelling  GASTROINTESTINAL: No abdominal or epigastric pain. No nausea, vomiting, or hematemesis; No diarrhea or constipation. No melena or hematochezia.  GENITOURINARY: No dysuria, frequency, hematuria, or incontinence  NEUROLOGICAL: No headaches, memory loss, loss of strength, numbness, or tremors  SKIN: No itching, burning, rashes, or lesions   LYMPH Nodes: No enlarged glands  ENDOCRINE: No heat or cold intolerance; No hair loss  MUSCULOSKELETAL: No joint pain or swelling; No muscle, back, or extremity pain  PSYCHIATRIC: No depression, anxiety, mood swings, or difficulty sleeping  HEME/LYMPH: No easy bruising, or bleeding gums  ALLERGY AND IMMUNOLOGIC: No hives or eczema	    [ ] All others negative	  [ ] Unable to obtain    PHYSICAL EXAM:  T(C): 37.3 (08-07-17 @ 04:16), Max: 37.3 (08-07-17 @ 04:16)  HR: 68 (08-07-17 @ 04:16) (56 - 68)  BP: 99/68 (08-07-17 @ 04:16) (99/68 - 115/66)  RR: 18 (08-07-17 @ 04:16) (18 - 18)  SpO2: 96% (08-07-17 @ 04:16) (95% - 96%)  Wt(kg): --  I&O's Summary    06 Aug 2017 07:01  -  07 Aug 2017 07:00  --------------------------------------------------------  IN: 945 mL / OUT: 0 mL / NET: 945 mL        Appearance: Normal	  HEENT:   Normal oral mucosa, PERRL, EOMI	  Lymphatic: No lymphadenopathy  Cardiovascular: Normal S1 S2, No JVD, + systolic  murmurs, No edema  Respiratory: Lungs clear to auscultation	  Psychiatry: A & O x 3, Mood & affect appropriate  Gastrointestinal:  Soft, Non-tender, + BS	  Skin: No rashes, No ecchymoses, No cyanosis	  Neurologic: Non-focal  Extremities: Normal range of motion, No clubbing, cyanosis or edema  Vascular: Peripheral pulses palpable 2+ bilaterally    MEDICATIONS  (STANDING):  spironolactone 25 milliGRAM(s) Oral daily  aspirin enteric coated 81 milliGRAM(s) Oral daily  lisinopril 5 milliGRAM(s) Oral daily  insulin lispro Injectable (HumaLOG) 4 Unit(s) SubCutaneous two times a day with meals  allopurinol 100 milliGRAM(s) Oral daily  atorvastatin 80 milliGRAM(s) Oral at bedtime  furosemide    Tablet 40 milliGRAM(s) Oral daily  levothyroxine 100 MICROGram(s) Oral daily  clopidogrel Tablet 75 milliGRAM(s) Oral daily  insulin lispro (HumaLOG) corrective regimen sliding scale   SubCutaneous three times a day before meals  dextrose 5%. 1000 milliLiter(s) (50 mL/Hr) IV Continuous <Continuous>  dextrose 50% Injectable 12.5 Gram(s) IV Push once  dextrose 50% Injectable 25 Gram(s) IV Push once  dextrose 50% Injectable 25 Gram(s) IV Push once  zolpidem 5 milliGRAM(s) Oral at bedtime  insulin glargine Injectable (LANTUS) 40 Unit(s) SubCutaneous at bedtime  metoprolol 50 milliGRAM(s) Oral two times a day  apixaban 5 milliGRAM(s) Oral every 12 hours      TELEMETRY: a.fib hr down to 39	    ECG:  	  RADIOLOGY:  OTHER: 	  	  LABS:	 	    CARDIAC MARKERS:  CARDIAC MARKERS ( 05 Aug 2017 20:24 )  x     / 0.01 ng/mL / x     / x     / x      CARDIAC MARKERS ( 05 Aug 2017 16:59 )  x     / <0.01 ng/mL / x     / x     / x      CARDIAC MARKERS ( 05 Aug 2017 11:42 )  x     / <0.01 ng/mL / x     / x     / x                                    13.8   6.92  )-----------( 181      ( 06 Aug 2017 08:49 )             43.2     08-06    137  |  101  |  33<H>  ----------------------------<  145<H>  4.9   |  20<L>  |  1.35<H>    Ca    9.2      06 Aug 2017 08:39  Phos  2.9     08-05  Mg     2.1     08-05    TPro  7.7  /  Alb  4.4  /  TBili  0.5  /  DBili  x   /  AST  29  /  ALT  38  /  AlkPhos  63  08-05    proBNP: Serum Pro-Brain Natriuretic Peptide: 446 pg/mL (08-05 @ 11:42)  Serum Pro-Brain Natriuretic Peptide: 400 pg/mL (08-02 @ 15:01)    Lipid Profile:   HgA1c: Hemoglobin A1C, Whole Blood: 9.7 % (08-06 @ 08:49)    TSH: Thyroid Stimulating Hormone, Serum: 5.81 uIU/mL (08-05 @ 17:17)

## 2017-08-08 NOTE — DISCHARGE NOTE ADULT - PLAN OF CARE
Rate controlled Continue your medications. Do not stop Aspirin or Plavix unless instructed by your cardiologist.   No heavy lifting, strenuous activity, bending, straining or unnecessary stair climbing for 2 weeks.  Follow up with Cardiologist - Dr. Painting on 8/10/17 at 4pm Atrial fibrillation is the most common heart rhythm problem.  The condition puts you at risk for has stroke and heart attack  It helps if you control your blood pressure, not drink more than 1-2 alcohol drinks per day, cut down on caffeine, getting treatment for over active thyroid gland, and get regular exercise  Call your doctor if you feel your heart racing or beating unusually, chest tightness or pain, lightheaded, faint, shortness of breath especially with exercise  It is important to take your heart medication as prescribed  You are on a blood thinner - Eliquis  Follow up with Cardiologist - Dr. Painting on 8/10/17 at 4pm Your next appointment with PMD - Dr. Carty is on 9/12 at 1130pm  HgA1C this admission - 9.7  Make sure you get your HgA1c checked every three months.  If you take insulin, check your blood glucose before meals and at bedtime.  It's important not to skip any meals.  Keep a log of your blood glucose results and always take it with you to your doctor appointments.  Keep a list of your current medications including injectables and over the counter medications and bring this medication list with you to all your doctor appointments.  If you have not seen your ophthalmologist this year call for appointment.  Check your feet daily for redness, sores, or openings. Do not self treat. If no improvement in two days call your primary care physician for an appointment.  Low blood sugar (hypoglycemia) is a blood sugar below 70mg/dl. Check your blood sugar if you feel signs/symptoms of hypoglycemia. If your blood sugar is below 70 take 15 grams of carbohydrates (ex 4 oz of apple juice, 3-4 glucose tablets, or 4-6 oz of regular soda) wait 15 minutes and repeat blood sugar to make sure it comes up above 70.  If your blood sugar is above 70 and you are due for a meal, have a meal.  If you are not due for a meal have a snack.  This snack helps keeps your blood sugar at a safe range. Take medications for your blood pressure as recommended.  Eat a heart healthy diet that is low in saturated fats and salt, and includes whole grains, fruits, vegetables and lean protein   Exercise regularly (consult with your physician or cardiologist first); maintain a heart healthy weight.   If you smoke - quit (A resource to help you stop smoking is the [facility] – phone number [phone].). Continue to follow with your primary physician or cardiologist.   Seek medical help for dizziness, Lightheadedness, Blurry vision, Headache, Chest pain, Shortness of breath  Follow up with your medical doctor to establish long term blood pressure treatment goals. Avoid taking (NSAIDs) - (ex: Ibuprofen, Advil, Celebrex, Naprosyn)  Avoid taking any nephrotoxic agents (can harm kidneys) - Intravenous contrast for diagnostic testing, combination cold medications.  Have all medications adjusted for your renal function by your Health Care Provider.  Blood pressure control is important.  Take all medication as prescribed. you do not make enough thyroid hormone  signs & symptoms of low levels of thyroid hormone - tired, getting cold easily, coarse or thin hair, constipation, shortness of breath, swelling, irregular periods  your doctor will do thyroid hormone blood tests at least once a year to monitor if medication dose is adequate  take your thyroid medicine as directed by your doctor & on empty stomach Continue allopurinol
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
## 2019-03-09 NOTE — DIETITIAN INITIAL EVALUATION ADULT. - NS AS NUTRI INTERV ED CONTENT
170.18
Discussed consistent CHO diet education.  Reviewed foods containing CHO, portion sizes of CHO, CHO counting, pairing CHO with proteins, reading food labels, importance of monitoring blood glucose levels, importance of not skipping meals.  Reviewed meal and snack options. Pt verbalized understanding and accepted written T2DM Nutrition Therapy diet education. Reviewed and provided DASH diet education. RD remains available to monitor PO intake, wt, labs and diet education review,/Purpose of the nutrition education/Nutrition relationship to health/disease/Recommended modifications/Other (specify)

## 2019-11-25 NOTE — PROVIDER CONTACT NOTE (OTHER) - REASON
Patient Education        Ear Infections (Otitis Media) in Children: Care Instructions  Your Care Instructions    An ear infection is an infection behind the eardrum. The most frequent kind of ear infection in children is called otitis media. It usually starts with a cold. Ear infections can hurt a lot. Children with ear infections often fuss and cry, pull at their ears, and sleep poorly. Older children will often tell you that their ear hurts. Most children will have at least one ear infection. Fortunately, children usually outgrow them, often about the time they enter grade school. Your doctor may prescribe antibiotics to treat ear infections. Antibiotics aren't always needed, especially in older children who aren't very sick. Your doctor will discuss treatment with you based on your child and his or her symptoms. Regular doses of pain medicine are the best way to reduce fever and help your child feel better. Follow-up care is a key part of your child's treatment and safety. Be sure to make and go to all appointments, and call your doctor if your child is having problems. It's also a good idea to know your child's test results and keep a list of the medicines your child takes. How can you care for your child at home? · Give your child acetaminophen (Tylenol) or ibuprofen (Advil, Motrin) for fever, pain, or fussiness. Be safe with medicines. Read and follow all instructions on the label. Do not give aspirin to anyone younger than 20. It has been linked to Reye syndrome, a serious illness. · If the doctor prescribed antibiotics for your child, give them as directed. Do not stop using them just because your child feels better. Your child needs to take the full course of antibiotics. · Place a warm washcloth on your child's ear for pain. · Encourage rest. Resting will help the body fight the infection. Arrange for quiet play activities. When should you call for help?   Call 911 anytime you think your child may Pt had 3.01 sec pause on cardiac monitor need emergency care. For example, call if:    · Your child is confused, does not know where he or she is, or is extremely sleepy or hard to wake up.   [facility] your doctor now or seek immediate medical care if:    · Your child seems to be getting much sicker.     · Your child has a new or higher fever.     · Your child's ear pain is getting worse.     · Your child has redness or swelling around or behind the ear.    Watch closely for changes in your child's health, and be sure to contact your doctor if:    · Your child has new or worse discharge from the ear.     · Your child is not getting better after 2 days (48 hours).     · Your child has any new symptoms, such as hearing problems after the ear infection has cleared. Where can you learn more? Go to http://jazmyn-ben.info/. Enter ([phone] in the search box to learn more about \"Ear Infections (Otitis Media) in Children: Care Instructions. \"  Current as of: October 21, 2018  Content Version: 12.2  © 0565-2854 [employer]. Care instructions adapted under license by [employer] (which disclaims liability or warranty for this information). If you have questions about a medical condition or this instruction, always ask your healthcare professional. [address] any warranty or liability for your use of this information.

## 2020-10-23 PROBLEM — Z00.00 ENCOUNTER FOR PREVENTIVE HEALTH EXAMINATION: Status: ACTIVE | Noted: 2020-10-23

## 2021-10-08 ENCOUNTER — RESULT CHARGE (OUTPATIENT)
Age: 86
End: 2021-10-08

## 2021-10-08 ENCOUNTER — APPOINTMENT (OUTPATIENT)
Dept: ENDOCRINOLOGY | Facility: CLINIC | Age: 86
End: 2021-10-08
Payer: MEDICARE

## 2021-10-08 VITALS
DIASTOLIC BLOOD PRESSURE: 60 MMHG | HEART RATE: 73 BPM | OXYGEN SATURATION: 99 % | HEIGHT: 63 IN | SYSTOLIC BLOOD PRESSURE: 104 MMHG | WEIGHT: 161 LBS | BODY MASS INDEX: 28.53 KG/M2

## 2021-10-08 DIAGNOSIS — E11.319 TYPE 2 DIABETES MELLITUS WITH UNSPECIFIED DIABETIC RETINOPATHY W/OUT MACULAR EDEMA: ICD-10-CM

## 2021-10-08 DIAGNOSIS — E11.42 TYPE 2 DIABETES MELLITUS WITH DIABETIC POLYNEUROPATHY: ICD-10-CM

## 2021-10-08 DIAGNOSIS — E11.22 TYPE 2 DIABETES MELLITUS WITH DIABETIC CHRONIC KIDNEY DISEASE: ICD-10-CM

## 2021-10-08 LAB
GLUCOSE BLDC GLUCOMTR-MCNC: 162
HBA1C MFR BLD HPLC: 7.2

## 2021-10-08 PROCEDURE — 83036 HEMOGLOBIN GLYCOSYLATED A1C: CPT | Mod: NC,QW

## 2021-10-08 PROCEDURE — 82962 GLUCOSE BLOOD TEST: CPT

## 2021-10-08 PROCEDURE — 99205 OFFICE O/P NEW HI 60 MIN: CPT | Mod: 25

## 2021-10-08 PROCEDURE — 95251 CONT GLUC MNTR ANALYSIS I&R: CPT

## 2021-10-08 RX ORDER — FUROSEMIDE 40 MG/1
40 TABLET ORAL DAILY
Refills: 0 | Status: ACTIVE | COMMUNITY

## 2021-10-08 RX ORDER — METOPROLOL TARTRATE 100 MG/1
100 TABLET, FILM COATED ORAL DAILY
Refills: 0 | Status: ACTIVE | COMMUNITY

## 2021-10-08 RX ORDER — RAMIPRIL 2.5 MG/1
2.5 CAPSULE ORAL DAILY
Refills: 0 | Status: ACTIVE | COMMUNITY

## 2021-10-08 RX ORDER — CLOPIDOGREL BISULFATE 75 MG/1
75 TABLET, FILM COATED ORAL DAILY
Refills: 0 | Status: ACTIVE | COMMUNITY

## 2021-10-08 RX ORDER — EMPAGLIFLOZIN 25 MG/1
25 TABLET, FILM COATED ORAL DAILY
Refills: 0 | Status: ACTIVE | COMMUNITY

## 2021-10-08 RX ORDER — LEVOTHYROXINE SODIUM 0.1 MG/1
100 TABLET ORAL DAILY
Refills: 0 | Status: ACTIVE | COMMUNITY

## 2021-10-08 RX ORDER — ATORVASTATIN CALCIUM 80 MG/1
80 TABLET, FILM COATED ORAL DAILY
Refills: 0 | Status: ACTIVE | COMMUNITY

## 2021-10-10 ENCOUNTER — NON-APPOINTMENT (OUTPATIENT)
Age: 86
End: 2021-10-10

## 2021-10-12 LAB
25(OH)D3 SERPL-MCNC: 54.9 NG/ML
ALBUMIN SERPL ELPH-MCNC: 4.9 G/DL
ALP BLD-CCNC: 60 U/L
ALT SERPL-CCNC: 15 U/L
ANION GAP SERPL CALC-SCNC: 14 MMOL/L
AST SERPL-CCNC: 17 U/L
BASOPHILS # BLD AUTO: 0.03 K/UL
BASOPHILS NFR BLD AUTO: 0.5 %
BILIRUB SERPL-MCNC: 0.7 MG/DL
BUN SERPL-MCNC: 29 MG/DL
CA-I SERPL-SCNC: 1.17 MMOL/L
CALCIUM SERPL-MCNC: 9.4 MG/DL
CALCIUM SERPL-MCNC: 9.4 MG/DL
CHLORIDE SERPL-SCNC: 97 MMOL/L
CHOLEST SERPL-MCNC: 172 MG/DL
CO2 SERPL-SCNC: 27 MMOL/L
CREAT SERPL-MCNC: 1.48 MG/DL
CREAT SPEC-SCNC: 30 MG/DL
EOSINOPHIL # BLD AUTO: 0.22 K/UL
EOSINOPHIL NFR BLD AUTO: 3.9 %
ESTIMATED AVERAGE GLUCOSE: 171 MG/DL
GLUCOSE SERPL-MCNC: 113 MG/DL
HBA1C MFR BLD HPLC: 7.6 %
HCT VFR BLD CALC: 41.5 %
HDLC SERPL-MCNC: 54 MG/DL
HGB BLD-MCNC: 12.7 G/DL
IMM GRANULOCYTES NFR BLD AUTO: 0.4 %
LDLC SERPL CALC-MCNC: 96 MG/DL
LYMPHOCYTES # BLD AUTO: 0.76 K/UL
LYMPHOCYTES NFR BLD AUTO: 13.5 %
MAN DIFF?: NORMAL
MCHC RBC-ENTMCNC: 28.3 PG
MCHC RBC-ENTMCNC: 30.6 GM/DL
MCV RBC AUTO: 92.6 FL
MICROALBUMIN 24H UR DL<=1MG/L-MCNC: <1.2 MG/DL
MICROALBUMIN/CREAT 24H UR-RTO: NORMAL MG/G
MONOCYTES # BLD AUTO: 0.57 K/UL
MONOCYTES NFR BLD AUTO: 10.1 %
NEUTROPHILS # BLD AUTO: 4.04 K/UL
NEUTROPHILS NFR BLD AUTO: 71.6 %
NONHDLC SERPL-MCNC: 119 MG/DL
PARATHYROID HORMONE INTACT: 70 PG/ML
PLATELET # BLD AUTO: 235 K/UL
POTASSIUM SERPL-SCNC: 4.4 MMOL/L
PROT SERPL-MCNC: 7.1 G/DL
RBC # BLD: 4.48 M/UL
RBC # FLD: 14.7 %
SODIUM SERPL-SCNC: 137 MMOL/L
T4 FREE SERPL-MCNC: 1.5 NG/DL
TRIGL SERPL-MCNC: 114 MG/DL
TSH SERPL-ACNC: 1.48 UIU/ML
WBC # FLD AUTO: 5.64 K/UL

## 2021-11-19 ENCOUNTER — NON-APPOINTMENT (OUTPATIENT)
Age: 86
End: 2021-11-19

## 2021-12-17 ENCOUNTER — APPOINTMENT (OUTPATIENT)
Dept: ENDOCRINOLOGY | Facility: CLINIC | Age: 86
End: 2021-12-17
Payer: MEDICARE

## 2021-12-17 VITALS
SYSTOLIC BLOOD PRESSURE: 110 MMHG | HEIGHT: 63 IN | DIASTOLIC BLOOD PRESSURE: 70 MMHG | WEIGHT: 162 LBS | HEART RATE: 74 BPM | BODY MASS INDEX: 28.7 KG/M2 | OXYGEN SATURATION: 96 %

## 2021-12-17 LAB — GLUCOSE BLDC GLUCOMTR-MCNC: 242

## 2021-12-17 PROCEDURE — 82962 GLUCOSE BLOOD TEST: CPT

## 2021-12-17 PROCEDURE — 99214 OFFICE O/P EST MOD 30 MIN: CPT | Mod: 25

## 2022-02-08 ENCOUNTER — APPOINTMENT (OUTPATIENT)
Dept: ENDOCRINOLOGY | Facility: CLINIC | Age: 87
End: 2022-02-08

## 2022-03-11 PROBLEM — E11.59 TYPE 2 DIABETES MELLITUS WITH CARDIAC COMPLICATION: Status: ACTIVE | Noted: 2021-10-08

## 2022-03-11 PROBLEM — Z98.890 HISTORY OF TRICUSPID VALVE REPAIR: Status: ACTIVE | Noted: 2022-03-11

## 2022-03-11 PROBLEM — Z98.890 HISTORY OF MITRAL VALVE REPAIR: Status: ACTIVE | Noted: 2022-03-11

## 2022-03-11 PROBLEM — I25.10 CAD (CORONARY ARTERY DISEASE): Status: ACTIVE | Noted: 2022-03-11

## 2022-03-11 PROBLEM — Z95.1 HISTORY OF CORONARY ARTERY BYPASS GRAFT: Status: ACTIVE | Noted: 2022-03-11

## 2022-03-11 PROBLEM — I48.91 AFIB: Status: ACTIVE | Noted: 2022-03-11

## 2022-03-11 PROBLEM — G45.9 TIA (TRANSIENT ISCHEMIC ATTACK): Status: ACTIVE | Noted: 2022-03-11

## 2022-03-11 RX ORDER — FLASH GLUCOSE SENSOR
KIT MISCELLANEOUS
Qty: 2 | Refills: 2 | Status: COMPLETED | COMMUNITY
Start: 2022-03-10 | End: 2022-03-11

## 2022-03-11 RX ORDER — FLASH GLUCOSE SENSOR
KIT MISCELLANEOUS
Qty: 6 | Refills: 0 | Status: COMPLETED | COMMUNITY
Start: 2021-12-17 | End: 2022-03-11

## 2022-03-14 ENCOUNTER — APPOINTMENT (OUTPATIENT)
Dept: CARDIOTHORACIC SURGERY | Facility: CLINIC | Age: 87
End: 2022-03-14
Payer: MEDICARE

## 2022-03-14 VITALS
HEIGHT: 63 IN | HEART RATE: 72 BPM | DIASTOLIC BLOOD PRESSURE: 72 MMHG | OXYGEN SATURATION: 95 % | SYSTOLIC BLOOD PRESSURE: 122 MMHG | WEIGHT: 163 LBS | BODY MASS INDEX: 28.88 KG/M2 | TEMPERATURE: 98.3 F | RESPIRATION RATE: 16 BRPM

## 2022-03-14 DIAGNOSIS — G45.9 TRANSIENT CEREBRAL ISCHEMIC ATTACK, UNSPECIFIED: ICD-10-CM

## 2022-03-14 DIAGNOSIS — I25.10 ATHEROSCLEROTIC HEART DISEASE OF NATIVE CORONARY ARTERY W/OUT ANGINA PECTORIS: ICD-10-CM

## 2022-03-14 DIAGNOSIS — Z98.890 OTHER SPECIFIED POSTPROCEDURAL STATES: ICD-10-CM

## 2022-03-14 DIAGNOSIS — Z78.9 OTHER SPECIFIED HEALTH STATUS: ICD-10-CM

## 2022-03-14 DIAGNOSIS — E11.59 TYPE 2 DIABETES MELLITUS WITH OTHER CIRCULATORY COMPLICATIONS: ICD-10-CM

## 2022-03-14 DIAGNOSIS — Z82.49 FAMILY HISTORY OF ISCHEMIC HEART DISEASE AND OTHER DISEASES OF THE CIRCULATORY SYSTEM: ICD-10-CM

## 2022-03-14 DIAGNOSIS — I48.91 UNSPECIFIED ATRIAL FIBRILLATION: ICD-10-CM

## 2022-03-14 DIAGNOSIS — Z95.1 PRESENCE OF AORTOCORONARY BYPASS GRAFT: ICD-10-CM

## 2022-03-14 DIAGNOSIS — I10 ESSENTIAL (PRIMARY) HYPERTENSION: ICD-10-CM

## 2022-03-14 PROCEDURE — 99204 OFFICE O/P NEW MOD 45 MIN: CPT

## 2022-04-21 ENCOUNTER — RX RENEWAL (OUTPATIENT)
Age: 87
End: 2022-04-21

## 2022-04-26 NOTE — ED ADULT NURSE NOTE - TEMPLATE
What Type Of Note Output Would You Prefer (Optional)?: Standard Output How Severe Are Your Spot(S)?: mild Have Your Spot(S) Been Treated In The Past?: has not been treated Hpi Title: Evaluation of Skin Lesions Additional History: Yearly fup upper torso check. Cardiac

## 2022-04-29 ENCOUNTER — APPOINTMENT (OUTPATIENT)
Dept: ENDOCRINOLOGY | Facility: CLINIC | Age: 87
End: 2022-04-29

## 2022-05-31 ENCOUNTER — APPOINTMENT (OUTPATIENT)
Dept: ENDOCRINOLOGY | Facility: CLINIC | Age: 87
End: 2022-05-31
Payer: MEDICARE

## 2022-05-31 VITALS
WEIGHT: 165 LBS | BODY MASS INDEX: 29.23 KG/M2 | HEART RATE: 69 BPM | HEIGHT: 63 IN | DIASTOLIC BLOOD PRESSURE: 80 MMHG | OXYGEN SATURATION: 96 % | SYSTOLIC BLOOD PRESSURE: 120 MMHG

## 2022-05-31 LAB — GLUCOSE BLDC GLUCOMTR-MCNC: 238

## 2022-05-31 PROCEDURE — 99215 OFFICE O/P EST HI 40 MIN: CPT | Mod: 25

## 2022-05-31 PROCEDURE — 82962 GLUCOSE BLOOD TEST: CPT

## 2022-05-31 PROCEDURE — 95251 CONT GLUC MNTR ANALYSIS I&R: CPT

## 2022-05-31 RX ORDER — CYCLOBENZAPRINE HYDROCHLORIDE 5 MG/1
5 TABLET, FILM COATED ORAL
Qty: 10 | Refills: 0 | Status: ACTIVE | COMMUNITY
Start: 2022-02-10

## 2022-05-31 RX ORDER — ALPRAZOLAM 0.25 MG/1
0.25 TABLET ORAL
Qty: 30 | Refills: 0 | Status: ACTIVE | COMMUNITY
Start: 2022-01-03

## 2022-05-31 RX ORDER — METOPROLOL SUCCINATE 100 MG/1
100 TABLET, EXTENDED RELEASE ORAL
Qty: 180 | Refills: 0 | Status: ACTIVE | COMMUNITY
Start: 2022-01-03

## 2022-05-31 RX ORDER — RANOLAZINE 500 MG/1
500 TABLET, EXTENDED RELEASE ORAL
Qty: 360 | Refills: 0 | Status: ACTIVE | COMMUNITY
Start: 2021-12-20

## 2022-05-31 RX ORDER — NIRMATRELVIR AND RITONAVIR 300-100 MG
20 X 150 MG & KIT ORAL
Qty: 30 | Refills: 0 | Status: ACTIVE | COMMUNITY
Start: 2022-05-10

## 2022-05-31 RX ORDER — INSULIN DETEMIR 100 [IU]/ML
100 INJECTION, SOLUTION SUBCUTANEOUS
Qty: 15 | Refills: 0 | Status: ACTIVE | COMMUNITY
Start: 2021-09-07

## 2022-06-06 ENCOUNTER — RX RENEWAL (OUTPATIENT)
Age: 87
End: 2022-06-06

## 2022-06-14 ENCOUNTER — APPOINTMENT (OUTPATIENT)
Dept: ENDOCRINOLOGY | Facility: CLINIC | Age: 87
End: 2022-06-14

## 2022-07-27 ENCOUNTER — RX CHANGE (OUTPATIENT)
Age: 87
End: 2022-07-27

## 2022-07-27 RX ORDER — BLOOD SUGAR DIAGNOSTIC
STRIP MISCELLANEOUS
Qty: 100 | Refills: 2 | Status: ACTIVE | COMMUNITY
Start: 2022-07-27 | End: 1900-01-01

## 2022-07-27 RX ORDER — BLOOD SUGAR DIAGNOSTIC
STRIP MISCELLANEOUS
Qty: 100 | Refills: 2 | Status: DISCONTINUED | COMMUNITY
Start: 2022-07-26 | End: 2022-07-27

## 2022-08-31 ENCOUNTER — APPOINTMENT (OUTPATIENT)
Dept: ENDOCRINOLOGY | Facility: CLINIC | Age: 87
End: 2022-08-31

## 2022-08-31 VITALS
BODY MASS INDEX: 29.23 KG/M2 | SYSTOLIC BLOOD PRESSURE: 126 MMHG | HEIGHT: 63 IN | WEIGHT: 165 LBS | HEART RATE: 72 BPM | DIASTOLIC BLOOD PRESSURE: 84 MMHG

## 2022-08-31 PROCEDURE — 99214 OFFICE O/P EST MOD 30 MIN: CPT

## 2022-09-23 NOTE — ED ADULT NURSE NOTE - CAS EDP DISCH DISPOSITION ADMI
[Follow-Up] : a follow-up visit [Abnormal CXR/ Chest CT] : an abnormal CXR/ chest CT [Shortness of Breath] : shortness of breath [TextBox_44] : SOB is better Telemetry

## 2023-01-16 ENCOUNTER — APPOINTMENT (OUTPATIENT)
Dept: ENDOCRINOLOGY | Facility: CLINIC | Age: 88
End: 2023-01-16
Payer: MEDICARE

## 2023-01-16 VITALS
WEIGHT: 176 LBS | HEART RATE: 71 BPM | SYSTOLIC BLOOD PRESSURE: 122 MMHG | BODY MASS INDEX: 31.18 KG/M2 | OXYGEN SATURATION: 98 % | DIASTOLIC BLOOD PRESSURE: 68 MMHG | HEIGHT: 63 IN

## 2023-01-16 LAB — GLUCOSE BLDC GLUCOMTR-MCNC: 233

## 2023-01-16 PROCEDURE — 99214 OFFICE O/P EST MOD 30 MIN: CPT | Mod: 25

## 2023-01-16 PROCEDURE — 82962 GLUCOSE BLOOD TEST: CPT

## 2023-01-16 PROCEDURE — 95251 CONT GLUC MNTR ANALYSIS I&R: CPT

## 2023-05-16 ENCOUNTER — APPOINTMENT (OUTPATIENT)
Dept: ENDOCRINOLOGY | Facility: CLINIC | Age: 88
End: 2023-05-16

## 2023-05-29 ENCOUNTER — RX RENEWAL (OUTPATIENT)
Age: 88
End: 2023-05-29

## 2023-09-20 ENCOUNTER — APPOINTMENT (OUTPATIENT)
Dept: ENDOCRINOLOGY | Facility: CLINIC | Age: 88
End: 2023-09-20
Payer: MEDICARE

## 2023-09-20 VITALS
BODY MASS INDEX: 29.59 KG/M2 | DIASTOLIC BLOOD PRESSURE: 82 MMHG | SYSTOLIC BLOOD PRESSURE: 120 MMHG | WEIGHT: 167 LBS | HEIGHT: 63 IN | HEART RATE: 76 BPM

## 2023-09-20 DIAGNOSIS — E21.3 HYPERPARATHYROIDISM, UNSPECIFIED: ICD-10-CM

## 2023-09-20 PROCEDURE — 99214 OFFICE O/P EST MOD 30 MIN: CPT | Mod: 25

## 2023-09-20 PROCEDURE — 95251 CONT GLUC MNTR ANALYSIS I&R: CPT

## 2023-11-06 RX ORDER — INSULIN ASPART INJECTION 100 [IU]/ML
100 INJECTION, SOLUTION SUBCUTANEOUS
Qty: 1 | Refills: 1 | Status: ACTIVE | COMMUNITY
Start: 2023-11-06 | End: 1900-01-01

## 2023-12-12 LAB — HBA1C MFR BLD HPLC: 8.1

## 2023-12-13 ENCOUNTER — APPOINTMENT (OUTPATIENT)
Dept: ENDOCRINOLOGY | Facility: CLINIC | Age: 88
End: 2023-12-13
Payer: MEDICARE

## 2023-12-13 VITALS
HEART RATE: 90 BPM | HEIGHT: 63 IN | DIASTOLIC BLOOD PRESSURE: 76 MMHG | WEIGHT: 173 LBS | BODY MASS INDEX: 30.65 KG/M2 | SYSTOLIC BLOOD PRESSURE: 138 MMHG | OXYGEN SATURATION: 99 %

## 2023-12-13 DIAGNOSIS — I10 ESSENTIAL (PRIMARY) HYPERTENSION: ICD-10-CM

## 2023-12-13 LAB — GLUCOSE BLDC GLUCOMTR-MCNC: 124

## 2023-12-13 PROCEDURE — 99215 OFFICE O/P EST HI 40 MIN: CPT | Mod: 25

## 2023-12-13 PROCEDURE — 82962 GLUCOSE BLOOD TEST: CPT

## 2024-03-13 ENCOUNTER — APPOINTMENT (OUTPATIENT)
Dept: ENDOCRINOLOGY | Facility: CLINIC | Age: 89
End: 2024-03-13
Payer: MEDICARE

## 2024-03-13 VITALS
HEIGHT: 63 IN | HEART RATE: 64 BPM | SYSTOLIC BLOOD PRESSURE: 122 MMHG | OXYGEN SATURATION: 99 % | BODY MASS INDEX: 29.77 KG/M2 | DIASTOLIC BLOOD PRESSURE: 72 MMHG | WEIGHT: 168 LBS

## 2024-03-13 DIAGNOSIS — E78.5 HYPERLIPIDEMIA, UNSPECIFIED: ICD-10-CM

## 2024-03-13 DIAGNOSIS — Z79.4 TYPE 2 DIABETES MELLITUS W/OUT COMPLICATIONS: ICD-10-CM

## 2024-03-13 DIAGNOSIS — E11.9 TYPE 2 DIABETES MELLITUS W/OUT COMPLICATIONS: ICD-10-CM

## 2024-03-13 DIAGNOSIS — E03.9 HYPOTHYROIDISM, UNSPECIFIED: ICD-10-CM

## 2024-03-13 LAB — GLUCOSE BLDC GLUCOMTR-MCNC: 156

## 2024-03-13 PROCEDURE — 99214 OFFICE O/P EST MOD 30 MIN: CPT

## 2024-03-13 PROCEDURE — 95251 CONT GLUC MNTR ANALYSIS I&R: CPT

## 2024-03-13 PROCEDURE — 82962 GLUCOSE BLOOD TEST: CPT

## 2024-03-13 RX ORDER — GABAPENTIN 300 MG/1
300 CAPSULE ORAL
Qty: 90 | Refills: 0 | Status: DISCONTINUED | COMMUNITY
Start: 2022-03-29 | End: 2024-03-13

## 2024-03-13 RX ORDER — RANOLAZINE 1000 MG/1
1000 TABLET, EXTENDED RELEASE ORAL
Refills: 0 | Status: DISCONTINUED | COMMUNITY
End: 2024-03-13

## 2024-03-13 RX ORDER — WARFARIN 6 MG/1
6 TABLET ORAL DAILY
Refills: 0 | Status: DISCONTINUED | COMMUNITY
End: 2024-03-13

## 2024-03-13 RX ORDER — DOXYCYCLINE HYCLATE 100 MG/1
100 CAPSULE ORAL
Qty: 20 | Refills: 0 | Status: DISCONTINUED | COMMUNITY
Start: 2022-04-26 | End: 2024-03-13

## 2024-03-13 RX ORDER — WARFARIN 5 MG/1
5 TABLET ORAL
Qty: 90 | Refills: 0 | Status: DISCONTINUED | COMMUNITY
Start: 2022-01-21 | End: 2024-03-13

## 2024-03-13 RX ORDER — WARFARIN 2.5 MG/1
2.5 TABLET ORAL
Qty: 90 | Refills: 0 | Status: DISCONTINUED | COMMUNITY
Start: 2022-01-21 | End: 2024-03-13

## 2024-03-13 RX ORDER — GABAPENTIN 300 MG/1
300 CAPSULE ORAL
Qty: 90 | Refills: 1 | Status: DISCONTINUED | COMMUNITY
Start: 2023-09-20 | End: 2024-03-13

## 2024-04-09 ENCOUNTER — RX RENEWAL (OUTPATIENT)
Age: 89
End: 2024-04-09

## 2024-04-09 RX ORDER — INSULIN ASPART 100 [IU]/ML
100 INJECTION, SOLUTION INTRAVENOUS; SUBCUTANEOUS
Qty: 3 | Refills: 1 | Status: ACTIVE | COMMUNITY
Start: 2022-04-21 | End: 1900-01-01

## 2024-06-04 ENCOUNTER — INPATIENT (INPATIENT)
Facility: HOSPITAL | Age: 89
LOS: 3 days | Discharge: HOME | DRG: 603 | End: 2024-06-08
Attending: HOSPITALIST | Admitting: INTERNAL MEDICINE
Payer: MEDICARE

## 2024-06-04 VITALS
RESPIRATION RATE: 18 BRPM | TEMPERATURE: 98 F | DIASTOLIC BLOOD PRESSURE: 72 MMHG | SYSTOLIC BLOOD PRESSURE: 146 MMHG | HEIGHT: 63 IN | HEART RATE: 95 BPM | WEIGHT: 167.99 LBS | OXYGEN SATURATION: 97 %

## 2024-06-04 DIAGNOSIS — Z95.1 PRESENCE OF AORTOCORONARY BYPASS GRAFT: Chronic | ICD-10-CM

## 2024-06-04 DIAGNOSIS — Z98.890 OTHER SPECIFIED POSTPROCEDURAL STATES: Chronic | ICD-10-CM

## 2024-06-04 DIAGNOSIS — L03.90 CELLULITIS, UNSPECIFIED: ICD-10-CM

## 2024-06-04 DIAGNOSIS — Z90.49 ACQUIRED ABSENCE OF OTHER SPECIFIED PARTS OF DIGESTIVE TRACT: Chronic | ICD-10-CM

## 2024-06-04 DIAGNOSIS — E89.0 POSTPROCEDURAL HYPOTHYROIDISM: Chronic | ICD-10-CM

## 2024-06-04 LAB
ALBUMIN SERPL ELPH-MCNC: 4 G/DL — SIGNIFICANT CHANGE UP (ref 3.3–5.2)
ALP SERPL-CCNC: 80 U/L — SIGNIFICANT CHANGE UP (ref 40–120)
ALT FLD-CCNC: 9 U/L — SIGNIFICANT CHANGE UP
ANION GAP SERPL CALC-SCNC: 13 MMOL/L — SIGNIFICANT CHANGE UP (ref 5–17)
APTT BLD: 55.7 SEC — HIGH (ref 24.5–35.6)
AST SERPL-CCNC: 13 U/L — SIGNIFICANT CHANGE UP
BASE EXCESS BLDV CALC-SCNC: 3.7 MMOL/L — HIGH (ref -2–3)
BASOPHILS # BLD AUTO: 0.04 K/UL — SIGNIFICANT CHANGE UP (ref 0–0.2)
BASOPHILS NFR BLD AUTO: 0.6 % — SIGNIFICANT CHANGE UP (ref 0–2)
BILIRUB SERPL-MCNC: 0.5 MG/DL — SIGNIFICANT CHANGE UP (ref 0.4–2)
BUN SERPL-MCNC: 31.4 MG/DL — HIGH (ref 8–20)
CA-I SERPL-SCNC: 1.19 MMOL/L — SIGNIFICANT CHANGE UP (ref 1.15–1.33)
CALCIUM SERPL-MCNC: 9 MG/DL — SIGNIFICANT CHANGE UP (ref 8.4–10.5)
CHLORIDE BLDV-SCNC: 101 MMOL/L — SIGNIFICANT CHANGE UP (ref 96–108)
CHLORIDE SERPL-SCNC: 101 MMOL/L — SIGNIFICANT CHANGE UP (ref 96–108)
CO2 SERPL-SCNC: 26 MMOL/L — SIGNIFICANT CHANGE UP (ref 22–29)
CREAT SERPL-MCNC: 1.37 MG/DL — HIGH (ref 0.5–1.3)
EGFR: 37 ML/MIN/1.73M2 — LOW
EOSINOPHIL # BLD AUTO: 0.15 K/UL — SIGNIFICANT CHANGE UP (ref 0–0.5)
EOSINOPHIL NFR BLD AUTO: 2.1 % — SIGNIFICANT CHANGE UP (ref 0–6)
GAS PNL BLDV: 135 MMOL/L — LOW (ref 136–145)
GAS PNL BLDV: SIGNIFICANT CHANGE UP
GLUCOSE BLDV-MCNC: 162 MG/DL — HIGH (ref 70–99)
GLUCOSE SERPL-MCNC: 157 MG/DL — HIGH (ref 70–99)
HCO3 BLDV-SCNC: 29 MMOL/L — SIGNIFICANT CHANGE UP (ref 22–29)
HCT VFR BLD CALC: 38.4 % — SIGNIFICANT CHANGE UP (ref 34.5–45)
HCT VFR BLDA CALC: 38 % — SIGNIFICANT CHANGE UP
HGB BLD CALC-MCNC: 12.6 G/DL — SIGNIFICANT CHANGE UP (ref 11.7–16.1)
HGB BLD-MCNC: 12.3 G/DL — SIGNIFICANT CHANGE UP (ref 11.5–15.5)
IMM GRANULOCYTES NFR BLD AUTO: 0.3 % — SIGNIFICANT CHANGE UP (ref 0–0.9)
INR BLD: 3.97 RATIO — HIGH (ref 0.85–1.18)
LACTATE BLDV-MCNC: 0.9 MMOL/L — SIGNIFICANT CHANGE UP (ref 0.5–2)
LYMPHOCYTES # BLD AUTO: 1.24 K/UL — SIGNIFICANT CHANGE UP (ref 1–3.3)
LYMPHOCYTES # BLD AUTO: 17.7 % — SIGNIFICANT CHANGE UP (ref 13–44)
MCHC RBC-ENTMCNC: 26.3 PG — LOW (ref 27–34)
MCHC RBC-ENTMCNC: 32 GM/DL — SIGNIFICANT CHANGE UP (ref 32–36)
MCV RBC AUTO: 82.1 FL — SIGNIFICANT CHANGE UP (ref 80–100)
MONOCYTES # BLD AUTO: 0.62 K/UL — SIGNIFICANT CHANGE UP (ref 0–0.9)
MONOCYTES NFR BLD AUTO: 8.9 % — SIGNIFICANT CHANGE UP (ref 2–14)
NEUTROPHILS # BLD AUTO: 4.92 K/UL — SIGNIFICANT CHANGE UP (ref 1.8–7.4)
NEUTROPHILS NFR BLD AUTO: 70.4 % — SIGNIFICANT CHANGE UP (ref 43–77)
PCO2 BLDV: 53 MMHG — HIGH (ref 39–42)
PH BLDV: 7.35 — SIGNIFICANT CHANGE UP (ref 7.32–7.43)
PLATELET # BLD AUTO: 208 K/UL — SIGNIFICANT CHANGE UP (ref 150–400)
PO2 BLDV: 42 MMHG — SIGNIFICANT CHANGE UP (ref 25–45)
POTASSIUM BLDV-SCNC: 4.3 MMOL/L — SIGNIFICANT CHANGE UP (ref 3.5–5.1)
POTASSIUM SERPL-MCNC: 4.4 MMOL/L — SIGNIFICANT CHANGE UP (ref 3.5–5.3)
POTASSIUM SERPL-SCNC: 4.4 MMOL/L — SIGNIFICANT CHANGE UP (ref 3.5–5.3)
PROT SERPL-MCNC: 6.7 G/DL — SIGNIFICANT CHANGE UP (ref 6.6–8.7)
PROTHROM AB SERPL-ACNC: 42.3 SEC — HIGH (ref 9.5–13)
RBC # BLD: 4.68 M/UL — SIGNIFICANT CHANGE UP (ref 3.8–5.2)
RBC # FLD: 16.5 % — HIGH (ref 10.3–14.5)
SAO2 % BLDV: 66.3 % — SIGNIFICANT CHANGE UP
SODIUM SERPL-SCNC: 139 MMOL/L — SIGNIFICANT CHANGE UP (ref 135–145)
WBC # BLD: 6.99 K/UL — SIGNIFICANT CHANGE UP (ref 3.8–10.5)
WBC # FLD AUTO: 6.99 K/UL — SIGNIFICANT CHANGE UP (ref 3.8–10.5)

## 2024-06-04 PROCEDURE — 73630 X-RAY EXAM OF FOOT: CPT | Mod: 26,RT

## 2024-06-04 PROCEDURE — 99223 1ST HOSP IP/OBS HIGH 75: CPT

## 2024-06-04 PROCEDURE — 99285 EMERGENCY DEPT VISIT HI MDM: CPT

## 2024-06-04 PROCEDURE — 99497 ADVNCD CARE PLAN 30 MIN: CPT | Mod: 25

## 2024-06-04 RX ORDER — ACETAMINOPHEN 500 MG
1000 TABLET ORAL ONCE
Refills: 0 | Status: COMPLETED | OUTPATIENT
Start: 2024-06-04 | End: 2024-06-06

## 2024-06-04 RX ORDER — PIPERACILLIN AND TAZOBACTAM 4; .5 G/20ML; G/20ML
3.38 INJECTION, POWDER, LYOPHILIZED, FOR SOLUTION INTRAVENOUS ONCE
Refills: 0 | Status: COMPLETED | OUTPATIENT
Start: 2024-06-04 | End: 2024-06-04

## 2024-06-04 RX ORDER — ACETAMINOPHEN 500 MG
650 TABLET ORAL EVERY 6 HOURS
Refills: 0 | Status: DISCONTINUED | OUTPATIENT
Start: 2024-06-04 | End: 2024-06-08

## 2024-06-04 RX ORDER — VANCOMYCIN HCL 1 G
1000 VIAL (EA) INTRAVENOUS ONCE
Refills: 0 | Status: COMPLETED | OUTPATIENT
Start: 2024-06-04 | End: 2024-06-04

## 2024-06-04 RX ORDER — ONDANSETRON 8 MG/1
4 TABLET, FILM COATED ORAL EVERY 8 HOURS
Refills: 0 | Status: DISCONTINUED | OUTPATIENT
Start: 2024-06-04 | End: 2024-06-08

## 2024-06-04 RX ORDER — LANOLIN ALCOHOL/MO/W.PET/CERES
3 CREAM (GRAM) TOPICAL AT BEDTIME
Refills: 0 | Status: DISCONTINUED | OUTPATIENT
Start: 2024-06-04 | End: 2024-06-08

## 2024-06-04 RX ADMIN — Medication 250 MILLIGRAM(S): at 20:07

## 2024-06-04 RX ADMIN — PIPERACILLIN AND TAZOBACTAM 3.38 GRAM(S): 4; .5 INJECTION, POWDER, LYOPHILIZED, FOR SOLUTION INTRAVENOUS at 22:08

## 2024-06-04 RX ADMIN — PIPERACILLIN AND TAZOBACTAM 200 GRAM(S): 4; .5 INJECTION, POWDER, LYOPHILIZED, FOR SOLUTION INTRAVENOUS at 20:06

## 2024-06-04 RX ADMIN — Medication 650 MILLIGRAM(S): at 22:32

## 2024-06-04 RX ADMIN — Medication 3 MILLIGRAM(S): at 22:32

## 2024-06-04 RX ADMIN — Medication 650 MILLIGRAM(S): at 22:53

## 2024-06-04 NOTE — ED PROVIDER NOTE - ATTENDING CONTRIBUTION TO CARE
Merlin: I performed a face to face bedside interview with patient regarding history of present illness, review of symptoms and past medical history. I completed an independent physical exam and ordered tests/medications as needed.  I have discussed patient's plan of care with the resident. The resident assisted in  executing the discussed plan. I was available for any questions or issues that may have arose during the execution of the plan of care.

## 2024-06-04 NOTE — ED ADULT TRIAGE NOTE - WEIGHT IN KG
12/3/2019  Yeison Bryant    Cee Fuentes RD, [facility] Nutrition and Diabetes Education     76.2

## 2024-06-04 NOTE — H&P ADULT - HISTORY OF PRESENT ILLNESS
88 y/o female with PMH of A-fib on Coumadin, CAD, HTN, HLD, DM-2, hypothyroidism was sent to the ED by PCP for evaluation of right foot cellulitis. Patient drooped a jar on her foot 3 weeks ago, developed hematoma, seen by PCP who prescribed Doxycycline x 7days without improvement. She got outpatient MRI to rule out fracture/OM; showed cellulitis surrounding a hematoma. PCP sent to the ED for IV antibiotic.      Patient seen and examined before midnight.     89 y/o female with PMH of A-fib on Coumadin, CAD s/p stents, HTN, HLD, DM-2, hypothyroidism was sent to the ED by PCP for evaluation of right foot cellulitis. Patient drooped a jar on her foot 2 weeks ago, developed hematoma, seen by PCP who prescribed Doxycycline x 7days without improvement. She got outpatient MRI to rule out fracture/OM; showed cellulitis surrounding a hematoma. PCP sent to the ED for IV antibiotic. She has no fever, chills, nausea, vomiting, chest pain, shortness of breath, palpitation, abdominal pain, change in bowel/urinary habit.

## 2024-06-04 NOTE — ED PROVIDER NOTE - CONSIDERATION OF ADMISSION OBSERVATION
Patient admitted to the hospital for cellulitis failing outpatient antibiotics Consideration of Admission/Observation

## 2024-06-04 NOTE — H&P ADULT - TIME BILLING
chart, labs and imaging reviewed. Physical examination, medication reconciliation and documentation. Discussion  with patient and ER nurse.

## 2024-06-04 NOTE — ED ADULT NURSE NOTE - NSICDXPASTMEDICALHX_GEN_ALL_CORE_FT
PAST MEDICAL HISTORY:  Afib     DM (diabetes mellitus)     HLD (hyperlipidemia)     HTN (hypertension)     Hypothyroidism     Stage 3 chronic kidney disease

## 2024-06-04 NOTE — ED PROVIDER NOTE - OBJECTIVE STATEMENT
89-year-old female history of A-fib on Coumadin, CAD, hypertension, diabetes presenting with cellulitis to right foot feeling outpatient antibiotics.  3 weeks ago dropped a jar on her foot and developed hematoma, unsure if there was an abrasion there.  It became erythematous and painful, started on doxycycline twice daily 7 days ago without improvement.  MRI on May 30 with no fracture or osteomyelitis but with positive cellulitis surrounding a hematoma.  Patient sent to ED for IV antibiotics.  Endorses fever and chills 1 to 2 days ago.  Denies chest pain, shortness of breath, palpitations, cough.

## 2024-06-04 NOTE — H&P ADULT - ASSESSMENT
89 y/o female with PMH of A-fib on Coumadin, CAD s/p stents, HTN, HLD, DM-2, hypothyroidism was sent to the ED by PCP for evaluation of right foot cellulitis. Patient drooped a jar on her foot 2 weeks ago, developed hematoma, seen by PCP who prescribed Doxycycline x 7days without improvement. She got outpatient MRI to rule out fracture/OM; showed cellulitis surrounding a hematoma. PCP sent to the ED for IV antibiotic.     Right foot cellulitis failed outpatient therapy   Admit to medical floor   Continue antibiotic   Pain control   Blood culture sent, follow up and adjust antibiotic as needed.     Hx of afib   Rate controlled   On Warfarin 2.5mg (M/W/Sat); 5mg (Sun/T/Th/F)   Monitor INR     HTN/HLD/CAD/CHF   Plavix 75mg   Atorvastatin 80mg   Ranolazine 500mg bid   Metoprolol tartrate 100mg daily (as per her list)   Zetia 10mg    Lasix 40mg     DM-2   Hold Jardiance 25mg   Levermir 10 units HS   Novolog 10units with meal   Insulin sliding scale     CKD-3   Cr. 1.3 7 (baseline 1.2-1.57)   Stable   Monitor renal function     Hypothyroidism   Synthroid 100mcg     Supportive   DVT prophylaxis: on Warfarin   Diet: DASH/CHO    Plan of care discussed with patient and ER nurse.

## 2024-06-04 NOTE — ED PROVIDER NOTE - PHYSICAL EXAMINATION
Gen: Well appearing in NAD  Head: NC/AT  Neck: trachea midline  Resp:  No distress, CTAB  CV: RRR  GI: soft, NTND  Ext: right  anterior foot with erythema/warmth/tenderness/edema. Normal DP pulse.  Sensation intact.  Neuro:  A&O appears non focal  Skin:  Warm and dry as visualized  Psych:  Normal affect and mood

## 2024-06-04 NOTE — ED ADULT TRIAGE NOTE - CHIEF COMPLAINT QUOTE
pt sent for infection to top of foot, pt had a jar fall onto her foot, 3 week old injury, sent for evaluation,  A&Ox3, resp wnl, right foot swollen, HX DM pt had MRI no FX no osteo

## 2024-06-04 NOTE — H&P ADULT - NSHPPHYSICALEXAM_GEN_ALL_CORE
Vital Signs Last 24 Hrs  T(C): 36.6 (04 Jun 2024 15:49), Max: 36.6 (04 Jun 2024 15:49)  T(F): 97.8 (04 Jun 2024 15:49), Max: 97.8 (04 Jun 2024 15:49)  HR: 75 (04 Jun 2024 22:08) (75 - 95)  BP: 155/83 (04 Jun 2024 22:08) (146/72 - 155/83)  BP(mean): --  RR: 20 (04 Jun 2024 22:08) (18 - 20)  SpO2: 97% (04 Jun 2024 22:08) (97% - 97%)    Parameters below as of 04 Jun 2024 22:08  Patient On (Oxygen Delivery Method): room air

## 2024-06-04 NOTE — ED PROVIDER NOTE - CLINICAL SUMMARY MEDICAL DECISION MAKING FREE TEXT BOX
patient is diabetic with a right foot cellulitis worsening despite 1 week of p.o. doxycycline.  Area demarcated with skin marker, ordered for labs, IV antibiotics, x-ray, patient to require admission.

## 2024-06-04 NOTE — ED PROVIDER NOTE - INDEPENDENTLY INTERPRETED
DISCHARGE INSTRUCTIONS     As part of your transition home, we are providing you with this set of discharge instructions, as a guide to help you in that process. In addition to the care provided during your hospital stay, there may be upcoming clinic visits, laboratory appointments, and/or results noted on this After Visit Summary (AVS).     To assist the physicians caring for you during this transition, we would like you remind you of the followin. Please call a Provider for help if you experience any of the following: Any questions or concerns  2. Activity Instructions: Normal activity as tolerated  3. Dressing/Wound Instructions: Not applicable  4. Lifting & Weight-bearing Instructions: No restrictions  5. Diet: Return to previous diet    If you have any questions 24-48 hours after discharge, please feel free to contact the hospital unit/department you were discharged from.       [facility] [phone], will call you to set up 1st appointment.         
Yes

## 2024-06-05 LAB
ANION GAP SERPL CALC-SCNC: 13 MMOL/L — SIGNIFICANT CHANGE UP (ref 5–17)
APTT BLD: 54 SEC — HIGH (ref 24.5–35.6)
BUN SERPL-MCNC: 32.2 MG/DL — HIGH (ref 8–20)
CALCIUM SERPL-MCNC: 8.6 MG/DL — SIGNIFICANT CHANGE UP (ref 8.4–10.5)
CHLORIDE SERPL-SCNC: 100 MMOL/L — SIGNIFICANT CHANGE UP (ref 96–108)
CO2 SERPL-SCNC: 23 MMOL/L — SIGNIFICANT CHANGE UP (ref 22–29)
CREAT SERPL-MCNC: 1.33 MG/DL — HIGH (ref 0.5–1.3)
EGFR: 39 ML/MIN/1.73M2 — LOW
GLUCOSE BLDC GLUCOMTR-MCNC: 163 MG/DL — HIGH (ref 70–99)
GLUCOSE BLDC GLUCOMTR-MCNC: 166 MG/DL — HIGH (ref 70–99)
GLUCOSE BLDC GLUCOMTR-MCNC: 245 MG/DL — HIGH (ref 70–99)
GLUCOSE BLDC GLUCOMTR-MCNC: 321 MG/DL — HIGH (ref 70–99)
GLUCOSE BLDC GLUCOMTR-MCNC: 68 MG/DL — LOW (ref 70–99)
GLUCOSE BLDC GLUCOMTR-MCNC: 78 MG/DL — SIGNIFICANT CHANGE UP (ref 70–99)
GLUCOSE SERPL-MCNC: 172 MG/DL — HIGH (ref 70–99)
HCT VFR BLD CALC: 36 % — SIGNIFICANT CHANGE UP (ref 34.5–45)
HGB BLD-MCNC: 11.2 G/DL — LOW (ref 11.5–15.5)
INR BLD: 3.72 RATIO — HIGH (ref 0.85–1.18)
MCHC RBC-ENTMCNC: 25.6 PG — LOW (ref 27–34)
MCHC RBC-ENTMCNC: 31.1 GM/DL — LOW (ref 32–36)
MCV RBC AUTO: 82.2 FL — SIGNIFICANT CHANGE UP (ref 80–100)
PLATELET # BLD AUTO: 187 K/UL — SIGNIFICANT CHANGE UP (ref 150–400)
POTASSIUM SERPL-MCNC: 4.2 MMOL/L — SIGNIFICANT CHANGE UP (ref 3.5–5.3)
POTASSIUM SERPL-SCNC: 4.2 MMOL/L — SIGNIFICANT CHANGE UP (ref 3.5–5.3)
PROTHROM AB SERPL-ACNC: 39.7 SEC — HIGH (ref 9.5–13)
RBC # BLD: 4.38 M/UL — SIGNIFICANT CHANGE UP (ref 3.8–5.2)
RBC # FLD: 16.2 % — HIGH (ref 10.3–14.5)
SODIUM SERPL-SCNC: 136 MMOL/L — SIGNIFICANT CHANGE UP (ref 135–145)
WBC # BLD: 6.52 K/UL — SIGNIFICANT CHANGE UP (ref 3.8–10.5)
WBC # FLD AUTO: 6.52 K/UL — SIGNIFICANT CHANGE UP (ref 3.8–10.5)

## 2024-06-05 PROCEDURE — 99233 SBSQ HOSP IP/OBS HIGH 50: CPT

## 2024-06-05 RX ORDER — GLUCAGON INJECTION, SOLUTION 0.5 MG/.1ML
1 INJECTION, SOLUTION SUBCUTANEOUS ONCE
Refills: 0 | Status: DISCONTINUED | OUTPATIENT
Start: 2024-06-05 | End: 2024-06-08

## 2024-06-05 RX ORDER — INSULIN LISPRO 100/ML
VIAL (ML) SUBCUTANEOUS
Refills: 0 | Status: DISCONTINUED | OUTPATIENT
Start: 2024-06-05 | End: 2024-06-08

## 2024-06-05 RX ORDER — ALPRAZOLAM 0.25 MG
0.25 TABLET ORAL DAILY
Refills: 0 | Status: DISCONTINUED | OUTPATIENT
Start: 2024-06-05 | End: 2024-06-08

## 2024-06-05 RX ORDER — CLOPIDOGREL BISULFATE 75 MG/1
75 TABLET, FILM COATED ORAL DAILY
Refills: 0 | Status: DISCONTINUED | OUTPATIENT
Start: 2024-06-05 | End: 2024-06-08

## 2024-06-05 RX ORDER — SODIUM CHLORIDE 9 MG/ML
1000 INJECTION, SOLUTION INTRAVENOUS
Refills: 0 | Status: DISCONTINUED | OUTPATIENT
Start: 2024-06-05 | End: 2024-06-08

## 2024-06-05 RX ORDER — CHOLECALCIFEROL (VITAMIN D3) 125 MCG
1000 CAPSULE ORAL DAILY
Refills: 0 | Status: DISCONTINUED | OUTPATIENT
Start: 2024-06-05 | End: 2024-06-08

## 2024-06-05 RX ORDER — INSULIN LISPRO 100/ML
VIAL (ML) SUBCUTANEOUS AT BEDTIME
Refills: 0 | Status: DISCONTINUED | OUTPATIENT
Start: 2024-06-05 | End: 2024-06-08

## 2024-06-05 RX ORDER — DEXTROSE 50 % IN WATER 50 %
15 SYRINGE (ML) INTRAVENOUS ONCE
Refills: 0 | Status: DISCONTINUED | OUTPATIENT
Start: 2024-06-05 | End: 2024-06-08

## 2024-06-05 RX ORDER — DEXTROSE 50 % IN WATER 50 %
12.5 SYRINGE (ML) INTRAVENOUS ONCE
Refills: 0 | Status: DISCONTINUED | OUTPATIENT
Start: 2024-06-05 | End: 2024-06-08

## 2024-06-05 RX ORDER — FUROSEMIDE 40 MG
40 TABLET ORAL DAILY
Refills: 0 | Status: DISCONTINUED | OUTPATIENT
Start: 2024-06-05 | End: 2024-06-08

## 2024-06-05 RX ORDER — LEVOTHYROXINE SODIUM 125 MCG
100 TABLET ORAL DAILY
Refills: 0 | Status: DISCONTINUED | OUTPATIENT
Start: 2024-06-05 | End: 2024-06-08

## 2024-06-05 RX ORDER — RANOLAZINE 500 MG/1
500 TABLET, FILM COATED, EXTENDED RELEASE ORAL
Refills: 0 | Status: DISCONTINUED | OUTPATIENT
Start: 2024-06-05 | End: 2024-06-08

## 2024-06-05 RX ORDER — ATORVASTATIN CALCIUM 80 MG/1
80 TABLET, FILM COATED ORAL AT BEDTIME
Refills: 0 | Status: DISCONTINUED | OUTPATIENT
Start: 2024-06-05 | End: 2024-06-08

## 2024-06-05 RX ORDER — INSULIN LISPRO 100/ML
10 VIAL (ML) SUBCUTANEOUS
Refills: 0 | Status: DISCONTINUED | OUTPATIENT
Start: 2024-06-05 | End: 2024-06-08

## 2024-06-05 RX ORDER — METOPROLOL TARTRATE 50 MG
100 TABLET ORAL DAILY
Refills: 0 | Status: DISCONTINUED | OUTPATIENT
Start: 2024-06-05 | End: 2024-06-08

## 2024-06-05 RX ORDER — DEXTROSE 50 % IN WATER 50 %
25 SYRINGE (ML) INTRAVENOUS ONCE
Refills: 0 | Status: DISCONTINUED | OUTPATIENT
Start: 2024-06-05 | End: 2024-06-08

## 2024-06-05 RX ORDER — LISINOPRIL 2.5 MG/1
10 TABLET ORAL DAILY
Refills: 0 | Status: DISCONTINUED | OUTPATIENT
Start: 2024-06-05 | End: 2024-06-08

## 2024-06-05 RX ORDER — CEFAZOLIN SODIUM 1 G
1000 VIAL (EA) INJECTION EVERY 8 HOURS
Refills: 0 | Status: DISCONTINUED | OUTPATIENT
Start: 2024-06-05 | End: 2024-06-06

## 2024-06-05 RX ORDER — DEXTROSE 10 % IN WATER 10 %
125 INTRAVENOUS SOLUTION INTRAVENOUS ONCE
Refills: 0 | Status: DISCONTINUED | OUTPATIENT
Start: 2024-06-05 | End: 2024-06-08

## 2024-06-05 RX ORDER — INSULIN GLARGINE 100 [IU]/ML
10 INJECTION, SOLUTION SUBCUTANEOUS AT BEDTIME
Refills: 0 | Status: DISCONTINUED | OUTPATIENT
Start: 2024-06-05 | End: 2024-06-08

## 2024-06-05 RX ADMIN — ATORVASTATIN CALCIUM 80 MILLIGRAM(S): 80 TABLET, FILM COATED ORAL at 21:59

## 2024-06-05 RX ADMIN — INSULIN GLARGINE 10 UNIT(S): 100 INJECTION, SOLUTION SUBCUTANEOUS at 23:09

## 2024-06-05 RX ADMIN — Medication 650 MILLIGRAM(S): at 05:28

## 2024-06-05 RX ADMIN — Medication 4: at 12:52

## 2024-06-05 RX ADMIN — Medication 40 MILLIGRAM(S): at 05:30

## 2024-06-05 RX ADMIN — Medication 1000 MILLIGRAM(S): at 12:52

## 2024-06-05 RX ADMIN — Medication 1000 MILLIGRAM(S): at 05:28

## 2024-06-05 RX ADMIN — Medication 2: at 10:23

## 2024-06-05 RX ADMIN — LISINOPRIL 10 MILLIGRAM(S): 2.5 TABLET ORAL at 05:29

## 2024-06-05 RX ADMIN — RANOLAZINE 500 MILLIGRAM(S): 500 TABLET, FILM COATED, EXTENDED RELEASE ORAL at 17:13

## 2024-06-05 RX ADMIN — Medication 650 MILLIGRAM(S): at 21:58

## 2024-06-05 RX ADMIN — Medication 650 MILLIGRAM(S): at 17:46

## 2024-06-05 RX ADMIN — CLOPIDOGREL BISULFATE 75 MILLIGRAM(S): 75 TABLET, FILM COATED ORAL at 10:25

## 2024-06-05 RX ADMIN — Medication 1000 MILLIGRAM(S): at 00:28

## 2024-06-05 RX ADMIN — Medication 10 UNIT(S): at 10:23

## 2024-06-05 RX ADMIN — RANOLAZINE 500 MILLIGRAM(S): 500 TABLET, FILM COATED, EXTENDED RELEASE ORAL at 05:28

## 2024-06-05 RX ADMIN — Medication 100 MICROGRAM(S): at 05:29

## 2024-06-05 RX ADMIN — Medication 1000 MILLIGRAM(S): at 21:59

## 2024-06-05 RX ADMIN — Medication 100 MILLIGRAM(S): at 05:30

## 2024-06-05 RX ADMIN — Medication 650 MILLIGRAM(S): at 22:41

## 2024-06-05 RX ADMIN — Medication 10 UNIT(S): at 12:51

## 2024-06-05 RX ADMIN — Medication 1000 UNIT(S): at 10:25

## 2024-06-05 RX ADMIN — Medication 3 MILLIGRAM(S): at 21:59

## 2024-06-05 NOTE — PATIENT PROFILE ADULT - FUNCTIONAL ASSESSMENT - DAILY ACTIVITY ASSESSMENT TYPE
INTERVAL HPI/OVERNIGHT EVENTS:  In past 24 hours, received 0 prn doses of Oxy     SUBJECTIVE AND OBJECTIVE:  Patient declined Arabic ; preferred for daughter at bedside to translate.   Patient with improvement of pain with dizziness improving a bit with decreased dose of ATC oxycodone dose.   Patient and daughter agree that can d/c ATC oxycodone as pain improving and dizziness improves with decreased oxycodone doses   Denies nausea/ dyspnea. Last bowel movement today.     Allergies    No Known Allergies    Intolerances      MEDICATIONS  (STANDING):  acetaminophen     Tablet .. 975 milliGRAM(s) Oral every 8 hours  bisacodyl 5 milliGRAM(s) Oral at bedtime  chlorhexidine 2% Cloths 1 Application(s) Topical daily  cholecalciferol 400 Unit(s) Oral daily  dextrose 5%. 1000 milliLiter(s) (50 mL/Hr) IV Continuous <Continuous>  dextrose 5%. 1000 milliLiter(s) (100 mL/Hr) IV Continuous <Continuous>  dextrose 50% Injectable 25 Gram(s) IV Push once  dextrose 50% Injectable 12.5 Gram(s) IV Push once  dextrose 50% Injectable 25 Gram(s) IV Push once  enoxaparin Injectable 30 milliGRAM(s) SubCutaneous every 24 hours  gabapentin 300 milliGRAM(s) Oral three times a day  glucagon  Injectable 1 milliGRAM(s) IntraMuscular once  influenza   Vaccine 0.5 milliLiter(s) IntraMuscular once  insulin glargine Injectable (LANTUS) 4 Unit(s) SubCutaneous at bedtime  insulin lispro (ADMELOG) corrective regimen sliding scale   SubCutaneous three times a day before meals  insulin lispro (ADMELOG) corrective regimen sliding scale   SubCutaneous at bedtime  insulin lispro Injectable (ADMELOG) 1 Unit(s) SubCutaneous three times a day before meals  lidocaine   4% Patch 1 Patch Transdermal daily  methadone    Tablet 10 milliGRAM(s) Oral every 12 hours  montelukast 10 milliGRAM(s) Oral at bedtime  multivitamin 1 Tablet(s) Oral daily  ondansetron   Disintegrating Tablet 4 milliGRAM(s) Oral every 8 hours  pantoprazole    Tablet 40 milliGRAM(s) Oral before breakfast  polyethylene glycol 3350 17 Gram(s) Oral two times a day  povidone iodine 5% Nasal Swab 1 Application(s) Both Nostrils once  senna 2 Tablet(s) Oral at bedtime    MEDICATIONS  (PRN):  dextrose Oral Gel 15 Gram(s) Oral once PRN Blood Glucose LESS THAN 70 milliGRAM(s)/deciliter  meclizine 12.5 milliGRAM(s) Oral every 6 hours PRN Dizziness  naloxone 1 mG/mL Injection for Intranasal Use 0.4 milliGRAM(s) IntraNasal every 3 minutes PRN overdose  oxyCODONE    IR 5 milliGRAM(s) Oral every 4 hours PRN Moderate Pain (4 - 6)  oxyCODONE    IR 10 milliGRAM(s) Oral every 4 hours PRN Severe Pain (7 - 10)  prochlorperazine   Injectable 5 milliGRAM(s) IV Push every 8 hours PRN refractory nausea      ITEMS UNCHECKED ARE NOT PRESENT    PRESENT SYMPTOMS: [ ]Unable to self-report due to altered mental status- see [ ] CPOT [ ] PAINADS [ ] RDOS  Source if other than patient:  [ ]Family   [ ]Team     Pain: [ x]yes [ ]no .  QOL impact - mild-moderate  Location - left thigh                     Aggravating factors - movement   Quality - tingling   Radiation - left lower extremity and lower back  Timing- constant with intermittent worsening episodes   Severity (0-10 scale): 10  Minimal acceptable level / Pain Goal (0-10 scale):  7-8    Dyspnea:                           [ ]Mild [ ]Moderate [ ]Severe  Anxiety:                             [ ]Mild [ ]Moderate [ ]Severe  Agitation:                          [ ]Mild [ ]Moderate [ ]Severe  Fatigue:                             [ ]Mild [ ]Moderate [ ]Severe  Nausea:                             [ ]Mild [ ]Moderate [ ]Severe  Loss of appetite:              [ ]Mild [ ]Moderate [ ]Severe  Constipation:                   [ ]Mild [ ]Moderate [ ]Severe  Diarrhea:                          [ ]Mild [ ]Moderate [ ]Severe    CPOT:    https://www.[facility].org/getattachment/[email or web address]/Critical-Care-Pain-Observation-Tool-(CPOT)    PCSSQ[Palliative Care Spiritual Screening Question]   Severity (0-10):  Score of 4 or > indicate consideration of Chaplaincy referral.  Chaplaincy Referral: [ ] yes [ ] refused [ ] following [x ] deferred    Caregiver [place]? : [ ] yes [ ] no [ ] Declined [x ] Deferred              Social work referral [ ] Patient & Family Centered Care Referral [ ]     Anticipatory Grief present?:  [x ] yes [ ] no  [ ] Deferred                  Social work referral [x ] Chaplaincy Referral[ ]    Other Symptoms:  [ x]All other review of systems negative     PHYSICAL EXAM:  Vital Signs Last 24 Hrs  T(C): 36.7 (30 Jan 2023 10:47), Max: 37.1 (29 Jan 2023 22:57)  T(F): 98 (30 Jan 2023 10:47), Max: 98.7 (29 Jan 2023 22:57)  HR: 74 (30 Jan 2023 10:47) (71 - 90)  BP: 96/63 (30 Jan 2023 10:47) (96/63 - 111/63)  BP(mean): --  RR: 18 (30 Jan 2023 10:47) (17 - 18)  SpO2: 100% (30 Jan 2023 10:47) (100% - 100%)    Parameters below as of 30 Jan 2023 10:47  Patient On (Oxygen Delivery Method): room air        GENERAL:  [x ]Alert  [x ]Oriented x 3  [ ]Lethargic  [ ]Cachexia  [ ]Unarousable  [x ]Verbal  [ ]Non-Verbal    Behavioral:   [ ] Anxiety  [ ] Delirium [ ] Agitation [ x] Calm  [ ] Other  HEENT:  [ x]Normal  [ ] Temporal Wasting  [ ]Dry mouth   [ ]ET Tube/Trach  [ ]Oral lesions  [ ] Mucositis  PULMONARY:   [ x]Clear [ ]Tachypnea  [ ]Audible excessive secretions   [ ]Rhonchi        [ ]Right [ ]Left [ ]Bilateral  [ ]Crackles        [ ]Right [ ]Left [ ]Bilateral  [ ]Wheezing     [ ]Right [ ]Left [ ]Bilateral  [ ]Diminished breath sounds [ ]right [ ]left [ ]bilateral  CARDIOVASCULAR:    [ x]Regular [ ]Irregular [ ]Tachy  [ ]Dmitry [ ]Murmur [ ]Other  GASTROINTESTINAL:  [ x]Soft  [ ]Distended   [ ]+BS  [x ]Non tender [ ]Tender  [ ]PEG [ ]OGT/ NGT  Last BM: 1/30  GENITOURINARY:   [x ]Normal [ ] Incontinent   [ ]Oliguria/Anuria   [ ][place]  MUSCULOSKELETAL:   [ ]Normal   [x ]Weakness with limited left lower extremity movement s/p left hip surgery  [ ]Bed/Wheelchair bound [ ]Edema  [  ] amputation  [  ] contraction  NEUROLOGIC:   [ x]No focal deficits  [ ]Cognitive impairment  [ ]Dysphagia [ ]Dysarthria [ ]Paresis [ ]Other   SKIN: See Nursing Skin Assessment for further details  [x ]Normal    [ ]Rash  [ ]Pressure ulcer(s)       Present on admission [ ]y [ ]n   [  ]  Wound    [  ] hyperpigmentation      CRITICAL CARE:  [ ]Shock Present  [ ]Septic [ ]Cardiogenic [ ]Neurologic [ ]Hypovolemic  [ ]Vasopressors [ ]Inotropes  [ ]Respiratory failure present [ ]Mechanical Ventilation [ ]Non-invasive ventilatory support [ ]High-Flow   [ ]Acute  [ ]Chronic [ ]Hypoxic  [ ]Hypercarbic [ ]Other  [ ]Other organ failure     LABS:                        10.5   5.38  )-----------( 101      ( 30 Jan 2023 07:15 )             31.8       01-30    134<L>  |  100  |  8   ----------------------------<  165<H>  3.6   |  27  |  0.42<L>    Ca    8.6      30 Jan 2023 07:15  Phos  3.5     01-30  Mg     1.70     01-30        RADIOLOGY & ADDITIONAL STUDIES: reviewed     Protein Calorie Malnutrition Present: [ ]mild [ ]moderate [ ]severe [ ]underweight [ ]morbid obesity  https://www.andeal.org/vault/9920/web/files/ONC/Table_Clinical%20Characteristics%20to%20Document%20Malnutrition-White%20JV%20et%20al%202012.pdf    Height (cm): 149 (12-29-22 @ 22:41), 149 (12-27-22 @ 16:21), 147.3 (10-22-22 @ 08:57)  Weight (kg): 43.5 (12-27-22 @ 16:21), 47 (10-22-22 @ 08:57), 45.9 (07-20-22 @ 11:51)  BMI (kg/m2): 19.6 (12-29-22 @ 22:41), 19.6 (12-27-22 @ 16:21), 21.7 (10-22-22 @ 08:57)    [ ]PPSV2 < or = 30%  [ ]significant weight loss [ ]poor nutritional intake [ ]anasarca   [ ]Artificial Nutrition    REFERRALS:   [ ]Chaplaincy  [ ]Hospice  [ ]Child Life  [ ]Social Work  [ x]Case management [ ]Holistic Therapy      Admission

## 2024-06-05 NOTE — GOALS OF CARE CONVERSATION - ADVANCED CARE PLANNING - CONVERSATION DETAILS
Advance care directive discussed with patient. She named her daughter (Josselyn Reyes @[phone]) as the health care proxy. Patient has not had this discussion with her family in the past but said she like to live. She has no restriction on medical management. Patient is FULL CODE.

## 2024-06-05 NOTE — PATIENT PROFILE ADULT - FALL HARM RISK - HARM RISK INTERVENTIONS

## 2024-06-05 NOTE — ED ADULT NURSE REASSESSMENT NOTE - NS ED NURSE REASSESS COMMENT FT1
A 2 RN skin check has been performed on admission to ED with RN witness PATI BRIZUELA.  A pressure injury WAS NOT identified.  redness noted to right foot - cellulitis. safety maintained.

## 2024-06-05 NOTE — PROGRESS NOTE ADULT - ASSESSMENT
88 y/o female with PMH of A-fib on Coumadin, CAD s/p stents, HTN, HLD, DM-2, hypothyroidism was sent to the ED by PCP for evaluation of right foot cellulitis. Patient drooped a jar on her foot 2 weeks ago, developed hematoma, seen by PCP who prescribed Doxycycline x 7days without improvement. She got outpatient MRI to rule out fracture/OM; showed cellulitis surrounding a hematoma. PCP sent to the ED for IV antibiotic.     Right foot cellulitis failed outpatient therapy   Continue ancef  Pain control   Blood culture sent, follow up and adjust antibiotic as needed.   cbc    Hx of afib   Rate controlled   inr high 3.7. hold coumadin.f/u inr  On Warfarin 2.5mg (M/W/Sat); 5mg (Sun/T/Th/F)   Monitor INR     HTN/HLD/CAD/CHF(unknown type)  Plavix 75mg   Atorvastatin 80mg   Ranolazine 500mg bid   Metoprolol tartrate 100mg daily (as per her list)   Zetia 10mg    Lasix 40mg   TTE-No prior TTE    DM-2   Hold Jardiance 25mg   Levermir 10 units HS   Novolog 10units with meal   Insulin sliding scale     CKD-3   Cr. 1.3 7 (baseline 1.2-1.57)   Stable   Monitor renal function     Hypothyroidism   Synthroid 100mcg     Supportive   DVT prophylaxis: on Warfarin   Diet: DASH/CHO       89 y/o female with PMH of A-fib on Coumadin, CAD s/p stents, HTN, HLD, DM-2, hypothyroidism was sent to the ED by PCP for evaluation of right foot cellulitis. Patient drooped a jar on her foot 2 weeks ago, developed hematoma, seen by PCP who prescribed Doxycycline x 7days without improvement. She got outpatient MRI to rule out fracture/OM; showed cellulitis surrounding a hematoma. PCP sent to the ED for IV antibiotic.     Right foot cellulitis failed outpatient therapy   Continue iv ancef  Pain control   Blood culture sent, follow up and adjust antibiotic as needed.   cbc    Hx of afib   Rate controlled   inr high 3.7. hold coumadin.f/u inr  On Warfarin 2.5mg (M/W/Sat); 5mg (Sun/T/Th/F)   Monitor INR     HTN/HLD/CAD  CHF(unknown type)  Plavix 75mg   Atorvastatin 80mg   Ranolazine 500mg bid   Metoprolol tartrate 100mg daily (as per her list)   Zetia 10mg    Lasix 40mg   TTE-No prior TTE    DM-2   Hold Jardiance 25mg   Levermir 10 units HS   Novolog 10units with meal   Insulin sliding scale     CKD-3   Cr. 1.3 7 (baseline 1.2-1.57)   Stable   Monitor renal function     Hypothyroidism   Synthroid 100mcg     Supportive   DVT prophylaxis: on Warfarin -hold for high inr  Diet: DASH/CHO    PT eval  disposition: active  Plan of care dw pt

## 2024-06-05 NOTE — GOALS OF CARE CONVERSATION - ADVANCED CARE PLANNING - DECISION MAKER
Consultation - Radiation Oncology      MRD:[id number] : 1941  Encounter: [id number]  Patient Information: Kye Acevedo      CHIEF COMPLAINT  Chief Complaint   Patient presents with   • Consult     Radiation Oncology     Cancer Staging   Primary malignant neoplasm of right kidney with metastasis from kidney to other site [facility])  Staging form: Kidney, AJCC 8th Edition  - Clinical: Stage IV (cT3b, cNX, pM1) - Signed by Rigoberto Kang MD on 2023  Histologic grade (G): G3  Histologic grading system: 4 grade system           History of Present Illness   Kye Acevedo is a 80 y o  male with a past history of melanoma and 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 who initially presented in 2018 with left lower extremity pain  Bone scan in 2018 reportedly demonstrated uptake in the left femoral neck, intertrochanteric area and proximal diaphysis  MRI demonstrated abnormal marrow signal in the left femoral neck, intertrochanteric region with extension to the proximal diaphysis with mild bone expansion and endosteal scalloping  Staging PET/CT on 2018 demonstrated a moderate-sized hypermetabolic mass in the superior pole of the right kidney and extending into the region of the right adrenal gland  There was an additional hypermetabolic mass in the inferior pole of the right kidney  There was a large hypermetabolic, necrotic left upper lobe lung mass and an additional hypermetabolic nodule within the medial aspect of the superior segment of the right lower lobe  There was a mild FDG-avid nodule within the posterior lateral aspect of the superior segment of the right lower lobe  There were bilateral hypermetabolic adrenal metastases  There was a moderate-sized hypermetabolic osseous metastasis extending from the left femoral head into the left femoral neck and left proximal femur   There was a hypermetabolic focus in the left anterior pelvis, with a peritoneal nodule adjacent to this focus  There were two hypermetabolic foci in the wall of the greater curvature of the stomach  Pathology from CT-guided biopsy of the right kidney on 7/26/28 confirmed high-grade renal cell carcinoma, clear cell subtype with possibility of sarcomatoid variant  This pathology has been internally reviewed at [facility] (7/26/2018) and favored poorly differentiated renal cell carcinoma  He reportedly underwent “embolization” of the tumor in the left hip on 8/1/2018  This was followed by left total hip arthroplasty on 8/2/2018  He reportedly received postoperative radiation to this area (records indicate 15 fractions)    He began treatment with dual immunotherapy followed by maintenance nivolumab  PET scan on 05/23/2022 showed necrotic left upper lobe lung mass measuring 2 5 x 2 4 cm with significant decrease from the initial PET scan with mild uptake nodule in the right lower lobe of the lung measuring 1 1 x 0 7 cm unchanged, nodule within the superior segment of the right lower lobe of the lung measuring 0 9 x 0 7 cm, mild uptake in the inferior pole of the right kidney measuring 1 7 x 1 6 cm, mild mesenteric, retroperitoneal, iliac, inguinal lymph node right adrenal nodule measuring 1 0 x 0 7 cm, left adrenal nodule measuring 2 2 x 1 6 cm and stable uptake in the left femoral head into the neck  He transferred his care to [facility]  He had to discontinue immunotherapy due to diarrhea and colitis in 7/2022  Surveillance CT in 11/7/22 demonstrated mild enlargement of a right middle lobe pulmonary nodule measuring 6 x 4 mm, previously measuring 5 x 3 mm with stable left adrenal nodule  He continued watchful observation and CT in 2/17/23 demonstrated a dominant lingular metastasis measuring 2 7 cm, previously measuring 2 7 cm  There was a stable 7 mm right middle lobe nodule without new pulmonary nodules    There was no evidence of progressive metastatic disease within the Patient abdomen and pelvis  Upon interview today, he endorses the above history  He now has regular bowel movements and has started to regain weight  He has mild left knee discomfort but left hip pain is minimal   He denies cough, shortness of breath or hemoptysis  He is without additional acute concerns  Historical Information   Oncology History   Primary malignant neoplasm of right kidney with metastasis from kidney to other site [facility])   7/8/2022 Initial Diagnosis    Primary malignant neoplasm of right kidney with metastasis from kidney to other site [facility])     8/8/2022 - 8/8/2022 Chemotherapy    nivolumab (OPDIVO) IVPB, 480 mg, Intravenous, Once, 0 of 6 cycles      Chemotherapy    nivolumab (OPDIVO) IVPB, 480 mg, Intravenous, Once, 0 of 6 cycles       2/24/2023 -  Cancer Staged    Staging form: Kidney, AJCC 8th Edition  - Clinical: Stage IV (cT3b, cNX, pM1) - Signed by Santiago Weldon MD on 2/24/2023  Histologic grade (G): G3  Histologic grading system: 4 grade system             Past Medical History:   Diagnosis Date   • Hypertension    • Kidney cancer, primary, with metastasis from kidney to other site [facility])    • MI (myocardial infarction) ([facility] [address] ) 2001     Past Surgical History:   Procedure Laterality Date   • HIP SURGERY Left 2018       History reviewed  No pertinent family history      Social History   Social History     Substance and Sexual Activity   Alcohol Use Never     Social History     Substance and Sexual Activity   Drug Use Never     Social History     Tobacco Use   Smoking Status Former   • Packs/day: 0 50   • Years: 15 00   • Pack years: 7 50   • Types: Cigarettes   Smokeless Tobacco Never   Tobacco Comments    Quit 40 years prior         Meds/Allergies     Current Outpatient Medications:   •  aspirin 81 mg chewable tablet, Chew 81 mg every other day, Disp: , Rfl:   •  atenolol (TENORMIN) 25 mg tablet, Take 25 mg by mouth daily, Disp: , Rfl:   •  calcium carbonate (OS-LITO) 600 MG tablet, Take 600 mg by mouth every other day, Disp: , Rfl:   •  cholecalciferol (VITAMIN D3) 400 units tablet, Take 400 Units by mouth every other day, Disp: , Rfl:   •  fluorouracil (EFUDEX) 5 % cream, Apply topically 2 (two) times a day, Disp: 40 g, Rfl: 0  •  lisinopril (ZESTRIL) 20 mg tablet, Take 20 mg by mouth daily, Disp: , Rfl:   •  pravastatin (PRAVACHOL) 10 mg tablet, Take 10 mg by mouth daily, Disp: , Rfl:   •  ondansetron (Zofran ODT) 4 mg disintegrating tablet, Take 1 tablet (4 mg total) by mouth every 6 (six) hours as needed for nausea or vomiting (Patient not taking: Reported on 3/10/2023), Disp: 30 tablet, Rfl: 1  •  predniSONE 10 mg tablet, Take 3 tablets (30 mg total) by mouth in the morning with food (Patient not taking: Reported on 3/10/2023), Disp: 60 tablet, Rfl: 0  Allergies   Allergen Reactions   • Latex Rash         Review of Systems   Constitutional: Negative for appetite change and fatigue  HENT: Negative  Eyes:        Wears glasses   Respiratory: Negative for cough, shortness of breath and wheezing  Cardiovascular: Negative  Gastrointestinal: Negative  Endocrine: Negative  Genitourinary: Negative  Musculoskeletal: Negative  Skin: Negative  Bruises frequently to hands and arms   Allergic/Immunologic: Negative  Neurological: Negative  Hematological: Bruises/bleeds easily  Psychiatric/Behavioral: Negative  OBJECTIVE:   /78 (BP Location: Left arm, Patient Position: Sitting, Cuff Size: Standard)   Pulse 67   Temp 97 8 °F (36 6 °C) (Temporal)   Resp 18   Ht 5' 8" (1 727 m)   Wt 74 kg (163 lb 3 2 oz)   SpO2 99%   BMI 24 81 kg/m²   Pain Assessment:  0  Performance Status: Karnofsky: 80 - Normal activity with effort; some signs or symptoms of disease    Physical Exam  HENT:      Head: Normocephalic and atraumatic  Eyes:      General: No scleral icterus  Extraocular Movements: Extraocular movements intact     Cardiovascular:      Rate and Rhythm: Normal rate    Pulmonary:      Effort: Pulmonary effort is normal    Abdominal:      General: Abdomen is flat  There is no distension  Musculoskeletal:         General: Normal range of motion  Cervical back: Normal range of motion  Skin:     General: Skin is warm and dry  Neurological:      General: No focal deficit present  Mental Status: He is alert  Psychiatric:         Mood and Affect: Mood normal               RESULTS  Lab Results    Chemistry        Component Value Date/Time    K 4 4 02/21/2023 1131     02/21/2023 1131    CO2 28 02/21/2023 1131    BUN 15 02/21/2023 1131    CREATININE 0 97 02/21/2023 1131        Component Value Date/Time    CALCIUM 9 1 02/21/2023 1131    ALKPHOS 81 02/21/2023 1131    AST 21 02/21/2023 1131    ALT 39 02/21/2023 1131            Lab Results   Component Value Date    WBC 6 21 02/21/2023    HGB 13 4 02/21/2023    HCT 40 9 02/21/2023    MCV 98 02/21/2023     02/21/2023         Imaging Studies  CT chest abdomen pelvis wo contrast    Result Date: 2/27/2023  Narrative: CT CHEST, ABDOMEN AND PELVIS WITHOUT IV CONTRAST INDICATION:   C64 1: Malignant neoplasm of right kidney, except renal pelvis  History of known metastatic clear cell renal cell carcinoma  The patient continues to be maintained on immunotherapy  Assess response to treatment  COMPARISON:  CT, dated 11/7/2022  CT, dated 7/25/2022  PET CT, dated May 23, 2022  TECHNIQUE: CT examination of the chest, abdomen and pelvis was performed without intravenous contrast  Axial, sagittal, and coronal 2D reformatted images were created from the source data and submitted for interpretation  Radiation dose length product (DLP) for this visit:  586 mGy-cm   This examination, like all CT scans performed in the [facility], was performed utilizing techniques to minimize radiation dose exposure, including the use of iterative reconstruction and automated exposure control   Enteric contrast was administered  FINDINGS: CHEST LUNGS:  Redemonstrated is the dominant lingular known metastasis  It measures approximately 27 x 26 mm (series 4, image 114), previously approximately 27 x 26 mm on the comparison PET/CT from May 2022  Also redemonstrated is associated traction bronchiectasis and scarring surrounding this metastasis  Also redemonstrated is the 7 mm right middle lobe hilar pulmonary nodule (series 4, image 158), stable  No new pulmonary nodules  Stable scarring in the superior segment of the right lower lobe  No lung consolidations  Mild bronchial wall thickening  Trachea is normal  PLEURA:  Unremarkable  HEART/GREAT VESSELS: Heart is enlarged  No pericardial effusion  Coronary arterial atherosclerotic calcifications  No thoracic aortic aneurysm  MEDIASTINUM AND KASEY:  Unremarkable  CHEST WALL AND LOWER NECK:  Unremarkable  ABDOMEN LIVER/BILIARY TREE:  Unremarkable  GALLBLADDER:  No calcified gallstones  No pericholecystic inflammatory change  SPLEEN:  Unremarkable  PANCREAS:  Unremarkable  ADRENAL GLANDS:  Redemonstrated is the calcified lesion involving the lateral david of the left adrenal gland, stable  Right adrenal gland is normal  KIDNEYS/URETERS:  No exophytic solid renal mass  Upper pole scarring likely relates to distant partial nephrectomy  No hydronephrosis  Left upper pole simple cyst  STOMACH AND BOWEL:  Sigmoid diverticulosis  APPENDIX:  No findings to suggest appendicitis  ABDOMINOPELVIC CAVITY:  No ascites  No pneumoperitoneum  No lymphadenopathy  VESSELS:  Atherosclerotic changes are present  No evidence of aneurysm  PELVIS REPRODUCTIVE ORGANS:  Prostate is enlarged  URINARY BLADDER:  Unremarkable  ABDOMINAL WALL/INGUINAL REGIONS:  Unremarkable  OSSEOUS STRUCTURES:  No acute fracture or destructive osseous lesion  Spinal degenerative changes are noted  Left hip ORIF  Impression: Chest: Stable size of the presumed metastasis  No new concerning pulmonary nodules   Abdomen and pelvis: Nothing to suggest progressive metastatic disease, within the confines of an examination without contrast  Workstation performed: [id number]         Pathology: See above      ASSESSMENT  1  Primary malignant neoplasm of right kidney with metastasis from kidney to other site [facility])  Ambulatory referral to Radiation Oncology        Cancer Staging   Primary malignant neoplasm of right kidney with metastasis from kidney to other site [facility])  Staging form: Kidney, AJCC 8th Edition  - Clinical: Stage IV (cT3b, cNX, pM1) - Signed by Ravin Kolb MD on 2/24/2023  Histologic grade (G): G3  Histologic grading system: 4 grade system        PLAN/DISCUSSION  No orders of the defined types were placed in this encounter  Jorge Chisholm is a 80 y o  male with a past history of melanoma and 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  He was treated with initial dual immunotherapy followed by maintenance nivolumab with treatment response  Nivolumab has been discontinued secondary to colitis  His most recent CT imaging demonstrates stable disease  He continues medical oncology follow up  I reviewed radiotherapy treatment options in the setting of metastatic RCC  For those with a limited number of metastatic sites (typically 1-3) there may be a role for SBRT consolidation following systemic therapy  However, given his presenting disease burden, there are limited data to support consolidation of all sites in this instance  Bob Smallwood may also be an indication for ablative radiotherapy; however, his most recent CT scan demonstrates stable disease  XRT may be considered for palliative purposes, relieving symptomatic sites or pain  He presently feels well without pain  After reviewing these XRT options, he mutually agreed to continue observation  Radiotherapy can be reserved for definitively progressing or symptomatic sites in the future      All questions were answered to his satisfaction  Thank you for involving me in Mr  Jesse Marker care  Olimpia Schumacher MD  3/10/2023,10:39 AM      Portions of the record may have been created with voice recognition software  Occasional wrong word or "sound a like" substitutions may have occurred due to the inherent limitations of voice recognition software  Read the chart carefully and recognize, using context, where substitutions have occurred

## 2024-06-05 NOTE — PATIENT PROFILE ADULT - HEALTH LITERACY
Transplant Hepatology Consult    Date: 2/6/2018    Referring Provider: Mitchel Braun MD    CC/Reason for Consult: AMS    HPI:  Silva Werner is a 60 year old AA female with a PMHx signficant for H/O EtOH cirrhosis. Patient's cirrhosis is complicated by portal HTN, EV, ascites, H/O SBP (7/2017), volume overload, and hepatic encephalopathy. Last EtOH use in 6/2016. She has multiple past admissions for encephalopathy from noncompliance with lactulose.     PMH further significant for COPD on home O2, HTN, and CAD S/P PCI 2011.      Patient presented to [facility] ED on 2/6 with AMS and abdominal pain. Per EMS the patient has been non-compliant with her lactulose. She was admitted for further work up. Hepatology was consulted. No subjective was able to be obtained due to her AMS.    Patient Active Problem List    Diagnosis Date Noted   • Altered mental status 12/31/2017     Priority: Low   • Chronic kidney disease, stage III (moderate) 10/05/2017     Priority: Low   • Alcoholic cirrhosis of liver with ascites (CMS/HCC) 09/25/2017     Priority: Low   • ALD (alcoholic liver disease) (CMS/HCC) 07/13/2017     Priority: Low   • Edema of both legs 05/17/2017     Priority: Low   • COPD (chronic obstructive pulmonary disease) (CMS/HCC) 02/22/2016     Priority: Low     Past Medical History:   Diagnosis Date   • Alcohol abuse     Quit June 2016   • Alcoholic liver disease (CMS/HCC)    • Chronic kidney disease    • COPD (chronic obstructive pulmonary disease) (CMS/HCC) 2/22/2016   • Depression    • Essential (primary) hypertension      Past Surgical History:   Procedure Laterality Date   • CHOLECYSTECTOMY     • PARACENTESIS      last 9/21/17     Prior to Admission medications    Medication Sig Start Date End Date Taking? Authorizing Provider   propranolol (INDERAL) 10 MG tablet Take 1 tablet by mouth 2 times daily. 2/5/18   Jr Vo MD   lactulose (CHRONULAC) 10 GM/15ML solution Take 45 mLs by mouth 3 times daily. Take  45mls by mouth 3 times daily--titrate for 4-5 bowel movements daily 2/5/18   Jr Vo MD   acetaminophen (TYLENOL) 325 MG tablet Take 650 mg by mouth every 6 hours as needed for Pain or Fever. Not to exceed 3,000mg per day    Outside Provider   bisacodyl (DULCOLAX) 10 MG suppository Place 10 mg rectally daily as needed for Constipation.    Outside Provider   Magnesium Hydroxide (MILK OF MAGNESIA PO)     Outside Provider   Multiple Vitamins-Minerals (MULTIVITAMIN ADULT PO) Take 1 tablet by mouth daily.    Outside Provider   sulfamethoxazole-trimethoprim (BACTRIM) 400-80 MG per tablet Take 1 tablet by mouth daily.    Outside Provider   zinc sulfate (ZINCATE) 220 (50 Zn) MG capsule Take 1 capsule by mouth daily (with breakfast). 12/25/17   Kiran Gardiner MD   furosemide (LASIX) 40 MG tablet Take 1 tablet by mouth daily. 11/24/17   Heather Quintero MD   spironolactone (ALDACTONE) 100 MG tablet Take 1 tablet by mouth daily. 10/11/17   Conrado Malhotra MD   lidocaine (XYLOCAINE) 2 % jelly Apply to hands and feet as needed once daily for pain/cramps 9/26/17   rJ Vo MD   pantoprazole (PROTONIX) 40 MG tablet TAKE ONE TABLET BY MOUTH AT BEDTIME 8/30/17   Jr Vo MD   rifAXIMin (XIFAXAN) 550 MG Tab Take 1 tablet by mouth every 12 hours. 7/10/17   Neymar Serrano PA-C   albuterol 108 (90 BASE) MCG/ACT inhaler Inhale 2 puffs into the lungs every 4 hours as needed for Shortness of Breath.     Outside Provider   aspirin 81 MG tablet Take 81 mg by mouth daily.    Outside Provider   fluticasone-salmeterol (ADVAIR) 250-50 MCG/DOSE AEPB Inhale 1 puff into the lungs two times daily.    Outside Provider   tiotropium (SPIRIVA) 18 MCG inhalation capsule Place 18 mcg into inhaler and inhale daily.    Outside Provider     ALLERGIES:  No Known Allergies  Social History   Substance Use Topics   • Smoking status: Current Every Day Smoker     Packs/day: 0.50     Types: Cigarettes   • Smokeless tobacco: Never  Used      Comment: 4 cigsdaily   • Alcohol use No      Comment:  last 6/16     Family History   Problem Relation Age of Onset   • Stroke/TIA Father    • Heart disease Paternal Grandmother        Review of Systems  Unable to obtain due to AMS    Objective:    Vitals:    02/06/18 1330   BP: 129/56   Pulse: 84   Resp: 26   Temp:        Physical Exam  General- Lethargic in NAD. Calm/cooperative. Well nourished.  Skin- Warm and dry to touch. No rashes/lesions. No jaundice.   Head- Atraumatic and normocephalic.   Eyes- No scleral icterus. EOMs intact.   Throat/Neck- Neck is supple and without JVD/Adenopathy. No masses, trachea midline and supple.  CV- S1S2, RRR. No murmurs, rubs or gallops to auscultation.  Pulmonary- Clear breath sounds to auscultation. On RA. Normal Respiratory Effort.  Abdomen- Round, firm, and moderately distended with palpation. + BS. Moderately tender diffusely.   Extremities- + PP (radial/pedal). No erythema. No edema.   Neuro-  No focal deficits. Strength and sensation grossly intact. Speech garbled. Answers \"[facility]\" to all questions. +wrist clonus  Psych- Patient not oriented.    Imaging/Diagnostics:   2/6/18 CXR  1.  Mild central pulmonary vasculature prominence.  2.  Ill-defined right infrahilar interstitial opacities. Findings are nonspecific, although the differential includes pneumonitis among other etiologies.    Assessment/Plan:  1. EtOH cirrhosis. MELD-Na 17  -- Last EtOH use 6/2016. ETOH cessation reinforced.   -- Patient is not an OLT candidate due to psychosocial concerns and severe COPD requiring home O2.   2. Hepatic Encephalopathy. Grade 3   -- HE is chronic, secondary to cirrhosis, and without hepatic coma   -- Panculture to rule out infection. Paracentesis to rule out SBP  -- Lactulose 30 g Q4H and Rifaximin.  -- Started on empiric Zosyn, Vanco, and Micafungin in ED due to concerns for infection  4. Portal HTN / Ascites / H/O SBP.   -- Significant abdominal distension and  tenderness. Order Paracentesis to rule out SBP  -- Continue Lasix 40 mg and Aldactone 100 mg daily  -- Recommend 2 g Na restriction when appropriate.   -- Bactrim SS QD for SBP prophylaxis.   5. Portal HTN / EV.  -- EGD 10/2/17 noted grade I-II EV and mild portal HTN gastropathy.  -- Continue Propranolol 10 mg BID and Protonix 40 mg QD.   6. HCC Screening.   -- Last AFP 4 (1/3/18)  -- Triphasic CT Abd (7/3/17) without focal lesion. Due for re-imaging, will repeat during this admission once medically stable  7. Severe COPD on home O2.   8. CAD s/p PCI in 2011    Neymar Serrano PA-C  Abdominal Transplant & Hepatology  Pager [phone]  2/6/2018    After 3:00 PM please call the answering service and page the [facility] on call for the service.    Patient has been evaluated. Agree with history, exam, assessment and plan as outlined above in PA'S note.  Patient was admitted with lower abdominal pain. Patient claims to remain abstinent with alcohol.  She has history of noncompliance with the medications, including lactulose.  We will panculture the patient, including an ultrasound to rule out significant ascites.  Patient has been started on broad-spectrum antibiotics.  Mai Perez MD   no

## 2024-06-06 LAB
ANION GAP SERPL CALC-SCNC: 14 MMOL/L — SIGNIFICANT CHANGE UP (ref 5–17)
APTT BLD: 48.3 SEC — HIGH (ref 24.5–35.6)
BUN SERPL-MCNC: 38.5 MG/DL — HIGH (ref 8–20)
CALCIUM SERPL-MCNC: 8.3 MG/DL — LOW (ref 8.4–10.5)
CHLORIDE SERPL-SCNC: 101 MMOL/L — SIGNIFICANT CHANGE UP (ref 96–108)
CO2 SERPL-SCNC: 22 MMOL/L — SIGNIFICANT CHANGE UP (ref 22–29)
CREAT SERPL-MCNC: 1.66 MG/DL — HIGH (ref 0.5–1.3)
EGFR: 30 ML/MIN/1.73M2 — LOW
GLUCOSE BLDC GLUCOMTR-MCNC: 128 MG/DL — HIGH (ref 70–99)
GLUCOSE BLDC GLUCOMTR-MCNC: 163 MG/DL — HIGH (ref 70–99)
GLUCOSE BLDC GLUCOMTR-MCNC: 166 MG/DL — HIGH (ref 70–99)
GLUCOSE BLDC GLUCOMTR-MCNC: 169 MG/DL — HIGH (ref 70–99)
GLUCOSE SERPL-MCNC: 137 MG/DL — HIGH (ref 70–99)
HCT VFR BLD CALC: 35.6 % — SIGNIFICANT CHANGE UP (ref 34.5–45)
HGB BLD-MCNC: 11.2 G/DL — LOW (ref 11.5–15.5)
INR BLD: 2.2 RATIO — HIGH (ref 0.85–1.18)
MCHC RBC-ENTMCNC: 25.5 PG — LOW (ref 27–34)
MCHC RBC-ENTMCNC: 31.5 GM/DL — LOW (ref 32–36)
MCV RBC AUTO: 81.1 FL — SIGNIFICANT CHANGE UP (ref 80–100)
MRSA PCR RESULT.: SIGNIFICANT CHANGE UP
PLATELET # BLD AUTO: 195 K/UL — SIGNIFICANT CHANGE UP (ref 150–400)
POTASSIUM SERPL-MCNC: 4.2 MMOL/L — SIGNIFICANT CHANGE UP (ref 3.5–5.3)
POTASSIUM SERPL-SCNC: 4.2 MMOL/L — SIGNIFICANT CHANGE UP (ref 3.5–5.3)
PROTHROM AB SERPL-ACNC: 23.9 SEC — HIGH (ref 9.5–13)
RBC # BLD: 4.39 M/UL — SIGNIFICANT CHANGE UP (ref 3.8–5.2)
RBC # FLD: 16.3 % — HIGH (ref 10.3–14.5)
S AUREUS DNA NOSE QL NAA+PROBE: SIGNIFICANT CHANGE UP
SODIUM SERPL-SCNC: 137 MMOL/L — SIGNIFICANT CHANGE UP (ref 135–145)
WBC # BLD: 6.52 K/UL — SIGNIFICANT CHANGE UP (ref 3.8–10.5)
WBC # FLD AUTO: 6.52 K/UL — SIGNIFICANT CHANGE UP (ref 3.8–10.5)

## 2024-06-06 PROCEDURE — 99233 SBSQ HOSP IP/OBS HIGH 50: CPT

## 2024-06-06 PROCEDURE — 99223 1ST HOSP IP/OBS HIGH 75: CPT

## 2024-06-06 RX ORDER — SODIUM CHLORIDE 9 MG/ML
250 INJECTION INTRAMUSCULAR; INTRAVENOUS; SUBCUTANEOUS ONCE
Refills: 0 | Status: COMPLETED | OUTPATIENT
Start: 2024-06-06 | End: 2024-06-06

## 2024-06-06 RX ORDER — CEFAZOLIN SODIUM 1 G
2000 VIAL (EA) INJECTION EVERY 12 HOURS
Refills: 0 | Status: DISCONTINUED | OUTPATIENT
Start: 2024-06-06 | End: 2024-06-08

## 2024-06-06 RX ORDER — WARFARIN SODIUM 2.5 MG/1
2.5 TABLET ORAL ONCE
Refills: 0 | Status: COMPLETED | OUTPATIENT
Start: 2024-06-06 | End: 2024-06-06

## 2024-06-06 RX ADMIN — RANOLAZINE 500 MILLIGRAM(S): 500 TABLET, FILM COATED, EXTENDED RELEASE ORAL at 05:32

## 2024-06-06 RX ADMIN — Medication 1: at 09:35

## 2024-06-06 RX ADMIN — WARFARIN SODIUM 2.5 MILLIGRAM(S): 2.5 TABLET ORAL at 22:17

## 2024-06-06 RX ADMIN — LISINOPRIL 10 MILLIGRAM(S): 2.5 TABLET ORAL at 05:32

## 2024-06-06 RX ADMIN — Medication 10 UNIT(S): at 09:34

## 2024-06-06 RX ADMIN — SODIUM CHLORIDE 1000 MILLILITER(S): 9 INJECTION INTRAMUSCULAR; INTRAVENOUS; SUBCUTANEOUS at 14:06

## 2024-06-06 RX ADMIN — INSULIN GLARGINE 10 UNIT(S): 100 INJECTION, SOLUTION SUBCUTANEOUS at 22:18

## 2024-06-06 RX ADMIN — Medication 1000 MILLIGRAM(S): at 23:20

## 2024-06-06 RX ADMIN — Medication 650 MILLIGRAM(S): at 06:31

## 2024-06-06 RX ADMIN — Medication 100 MICROGRAM(S): at 05:31

## 2024-06-06 RX ADMIN — Medication 1000 MILLIGRAM(S): at 05:32

## 2024-06-06 RX ADMIN — Medication 650 MILLIGRAM(S): at 05:31

## 2024-06-06 RX ADMIN — CLOPIDOGREL BISULFATE 75 MILLIGRAM(S): 75 TABLET, FILM COATED ORAL at 10:08

## 2024-06-06 RX ADMIN — Medication 40 MILLIGRAM(S): at 05:32

## 2024-06-06 RX ADMIN — Medication 100 MILLIGRAM(S): at 05:32

## 2024-06-06 RX ADMIN — RANOLAZINE 500 MILLIGRAM(S): 500 TABLET, FILM COATED, EXTENDED RELEASE ORAL at 17:44

## 2024-06-06 RX ADMIN — Medication 1000 UNIT(S): at 10:09

## 2024-06-06 RX ADMIN — Medication 0.25 MILLIGRAM(S): at 21:40

## 2024-06-06 RX ADMIN — Medication 1: at 17:06

## 2024-06-06 RX ADMIN — Medication 10 UNIT(S): at 17:05

## 2024-06-06 RX ADMIN — ATORVASTATIN CALCIUM 80 MILLIGRAM(S): 80 TABLET, FILM COATED ORAL at 21:40

## 2024-06-06 RX ADMIN — Medication 400 MILLIGRAM(S): at 22:20

## 2024-06-06 RX ADMIN — Medication 2000 MILLIGRAM(S): at 17:49

## 2024-06-06 NOTE — CONSULT NOTE ADULT - ASSESSMENT
89y  Female with h/o A-fib, CAD s/p stents, HTN, HLD, DM-2, hypothyroidism. Patient was sent to the ED by PCP for evaluation of right foot cellulitis. Patient dropped a jar on her foot 2 weeks ago, developed hematoma, seen by PCP who prescribed Doxycycline x 7days without improvement. She got outpatient MRI to rule out fracture/OM; showed cellulitis surrounding a hematoma. PCP sent to the ED for IV antibiotic. She has no fever, chills, nausea, vomiting, chest pain, shortness of breath, palpitation, abdominal pain, change in bowel/urinary habit. Patient has been afebrile, no leukocytosis. Has been on IV Cefazolin.      Rt Foot Cellulitis   RACHEL       - Try to obtain outpatient imaging   - Blood cultures 6/4 no growth   - XR Rt foot with no acute findings   - Continue Cefazolin, dose adjusted to renal function  - Need Vascular surgery eval, can be outpatient.   - Hold off on PICC/Midline for now unless needed for reasons other than infectious diseases  - Follow up cultures  - Trend Fever  - Trend WBC      Thank you for allowing me to participate in the care of your patient.   Will Follow    Discussed treatment plan with: Dr Driscoll    89y  Female with h/o A-fib, CAD s/p stents, HTN, HLD, DM-2, hypothyroidism. Patient was sent to the ED by PCP for evaluation of right foot cellulitis. Patient dropped a jar on her foot 2 weeks ago, developed hematoma, seen by PCP who prescribed Doxycycline x 7days without improvement. She got outpatient MRI to rule out fracture/OM; showed cellulitis surrounding a hematoma. PCP sent to the ED for IV antibiotic. She has no fever, chills, nausea, vomiting, chest pain, shortness of breath, palpitation, abdominal pain, change in bowel/urinary habit. Patient has been afebrile, no leukocytosis. Has been on IV Cefazolin.      Rt Foot Cellulitis   RACHEL       - Try to obtain outpatient imaging   - Blood cultures 6/4 no growth   - XR Rt foot with no acute findings   - Continue Cefazolin, dose adjusted to renal function  - Need Vascular surgery eval, can be outpatient.   - Hold off on PICC/Midline for now unless needed for reasons other than infectious diseases  - Follow up cultures  - Trend Fever  - Trend WBC      Addendum 1300  MRI from outpatient facility reporting no OM or acute findings.   On D/C can switch to Keflex.       Thank you for allowing me to participate in the care of your patient.   Will follow      Discussed treatment plan with: Dr Driscoll

## 2024-06-06 NOTE — CONSULT NOTE ADULT - SUBJECTIVE AND OBJECTIVE BOX
[facility]  INFECTIOUS DISEASES at [place] / [place] / [place]  =======================================================                               Omero Cortes MD#   Marissa Blas MD*                             Danica Robertson MD*   Lyn Chavez MD*                              Professor Emeritus:  Dr Lavon Flores MD^            Diplomates American Board of Internal Medicine & Infectious Diseases                # [place] Office - Appt - Tel  [phone] Fax [phone]                * [place] Office - Appt - Tel [phone] Fax [phone]               ^ [place] Office - Appt - Tel  [phone] Fax [phone]                                  Hospital Consult line:  [phone]  =======================================================      [id number]  KARL WINKLER    CC: Patient is a 89y old  Female who presents with a chief complaint of     89y  Female with h/o A-fib, CAD s/p stents, HTN, HLD, DM-2, hypothyroidism. Patient was sent to the ED by PCP for evaluation of right foot cellulitis. Patient dropped a jar on her foot 2 weeks ago, developed hematoma, seen by PCP who prescribed Doxycycline x 7days without improvement. She got outpatient MRI to rule out fracture/OM; showed cellulitis surrounding a hematoma. PCP sent to the ED for IV antibiotic. She has no fever, chills, nausea, vomiting, chest pain, shortness of breath, palpitation, abdominal pain, change in bowel/urinary habit. Patient has been afebrile, no leukocytosis. Has been on IV Cefazolin. ID input requested 6/6/24.       Past Medical & Surgical Hx:  HTN (hypertension)  HLD (hyperlipidemia)  Afib  Hypothyroidism  Stage 3 chronic kidney disease  DM (diabetes mellitus)      Social Hx:  Denies smoking       FAMILY HISTORY:  FH: heart disease (Father)      Allergies  Brilinta (Unknown)       REVIEW OF SYSTEMS:  CONSTITUTIONAL:  No Fever or chills  HEENT:  No diplopia or blurred vision.  No earache, sore throat or runny nose.  CARDIOVASCULAR:  No chest pain  RESPIRATORY:  No cough, shortness of breath  GASTROINTESTINAL:  No nausea, vomiting or diarrhea.  GENITOURINARY:  No dysuria, frequency or urgency. No Blood in urine  MUSCULOSKELETAL:  no joint aches, no muscle pain  SKIN:  Rt Foot erythema and swelling   NEUROLOGIC:  No Headaches      Physical Exam:  GEN: NAD  HEENT: normocephalic and atraumatic. EOMI. PERRL.    NECK: Supple.   LUNGS: CTA B/L.  HEART: RRR  ABDOMEN: Soft, NT, ND.  +BS.    : No CVA tenderness  EXTREMITIES: Without  edema.  MSK: No joint swelling  NEUROLOGIC: No Focal Deficits   SKIN: Rt Foot erythema and swelling       Vitals:  T(F): 97.5 (06 Jun 2024 08:34), Max: 98.2 (05 Jun 2024 18:27)  HR: 71 (06 Jun 2024 08:34)  BP: 121/80 (06 Jun 2024 08:34)  RR: 18 (06 Jun 2024 08:34)  SpO2: 98% (06 Jun 2024 08:34) (95% - 98%)  temp max in last 48H T(F): , Max: 98.2 (06-05-24 @ 18:27)      Current Antibiotics:  ceFAZolin  Injectable. 1000 milliGRAM(s) IV Push every 8 hours    Other medications:  acetaminophen   IVPB .. 1000 milliGRAM(s) IV Intermittent once  atorvastatin 80 milliGRAM(s) Oral at bedtime  cholecalciferol 1000 Unit(s) Oral daily  clopidogrel Tablet 75 milliGRAM(s) Oral daily  dextrose 10% Bolus 125 milliLiter(s) IV Bolus once  dextrose 5%. 1000 milliLiter(s) IV Continuous <Continuous>  dextrose 5%. 1000 milliLiter(s) IV Continuous <Continuous>  dextrose 50% Injectable 25 Gram(s) IV Push once  dextrose 50% Injectable 12.5 Gram(s) IV Push once  furosemide    Tablet 40 milliGRAM(s) Oral daily  glucagon  Injectable 1 milliGRAM(s) IntraMuscular once  insulin glargine Injectable (LANTUS) 10 Unit(s) SubCutaneous at bedtime  insulin lispro (ADMELOG) corrective regimen sliding scale   SubCutaneous three times a day before meals  insulin lispro (ADMELOG) corrective regimen sliding scale   SubCutaneous at bedtime  insulin lispro Injectable (ADMELOG) 10 Unit(s) SubCutaneous three times a day before meals  levothyroxine 100 MICROGram(s) Oral daily  lisinopril 10 milliGRAM(s) Oral daily  metoprolol tartrate 100 milliGRAM(s) Oral daily  ranolazine 500 milliGRAM(s) Oral two times a day  warfarin 2.5 milliGRAM(s) Oral once                            11.2   6.52  )-----------( 195      ( 06 Jun 2024 02:57 )             35.6     06-06    137  |  101  |  38.5<H>  ----------------------------<  137<H>  4.2   |  22.0  |  1.66<H>    Ca    8.3<L>      06 Jun 2024 02:57    TPro  6.7  /  Alb  4.0  /  TBili  0.5  /  DBili  x   /  AST  13  /  ALT  9   /  AlkPhos  80  06-04    RECENT CULTURES:  06-04 @ 19:44 .Blood Blood-Peripheral     No growth at 24 hours    WBC Count: 6.52 K/uL (06-06-24 @ 02:57)  WBC Count: 6.52 K/uL (06-05-24 @ 02:57)  WBC Count: 6.99 K/uL (06-04-24 @ 19:44)    Creatinine: 1.66 mg/dL (06-06-24 @ 02:57)  Creatinine: 1.33 mg/dL (06-05-24 @ 02:57)  Creatinine: 1.37 mg/dL (06-04-24 @ 19:44)               < from: Xray Foot AP + Lateral + Oblique, Right (06.04.24 @ 20:50) >  ACC: [id number] EXAM:  XR FOOT COMP MIN 3 VIEWS RT   ORDERED BY: JORGE DC     PROCEDURE DATE:  06/04/2024          INTERPRETATION:  Right foot    HISTORY: Foot infection     3 views of the right foot show no evidence of fracture nor destructive   change. The joint spaces are maintained.    IMPRESSION: No acute bony pathology.        Thank you for this referral.    --- End of Report ---    < end of copied text >

## 2024-06-06 NOTE — CONSULT NOTE ADULT - CONSULT REQUESTED DATE/TIME
06-Jun-2024 10:11 Nasal mucosa clear. Mouth with normal mucosa. Throat has no vesicles, no oropharyngeal exudates and uvula is midline.

## 2024-06-06 NOTE — PROGRESS NOTE ADULT - ASSESSMENT
89 y/o female with PMH of A-fib on Coumadin, CAD s/p stents, HTN, HLD, DM-2, hypothyroidism was sent to the ED by PCP for evaluation of right foot cellulitis. Patient drooped a jar on her foot 2 weeks ago, developed hematoma, seen by PCP who prescribed Doxycycline x 7days without improvement. She got outpatient MRI to rule out fracture/OM; showed cellulitis surrounding a hematoma. PCP sent to the ED for IV antibiotic.     Right foot cellulitis failed outpatient therapy   Continue iv ancef  Pain control   Blood culture sent, follow up and adjust antibiotic as needed.   cbc  c/s ID          Hx of afib   Rate controlled   INR 2.2 .Will resume  coumadin.f/u inr  On Warfarin 2.5mg (M/W/Sat); 5mg (Sun/T/Th/F)   Monitor INR     HTN/HLD/CAD  CHF(unknown type)  Plavix 75mg   Atorvastatin 80mg   Ranolazine 500mg bid   Metoprolol tartrate 100mg daily (as per her list)   Zetia 10mg    Lasix 40mg   TTE-No prior TTE    DM-2   Hold Jardiance 25mg   Levermir 10 units HS   Novolog 10units with meal   Insulin sliding scale           CKD-3   Cr. 1.6 (baseline 1.2-1.57)   Monitor renal function     Hypothyroidism   Synthroid 100mcg     Supportive   DVT prophylaxis: on Warfarin -hold for high inr  Diet: DASH/CHO    PT eval  disposition: active  Plan of care alirio pt   88 y/o female with PMH of A-fib on Coumadin, CAD s/p stents, HTN, HLD, DM-2, hypothyroidism was sent to the ED by PCP for evaluation of right foot cellulitis. Patient drooped a jar on her foot 2 weeks ago, developed hematoma, seen by PCP who prescribed Doxycycline x 7days without improvement. She got outpatient MRI to rule out fracture/OM; showed cellulitis surrounding a hematoma. PCP sent to the ED for IV antibiotic.     Right foot cellulitis failed outpatient therapy   Continue iv ancef  Pain control   Blood culture sent, follow up and adjust antibiotic as needed.   MRI foot w/o contrast  05/30/24  from outpatient facility reporting no OM or acute fx. severe soft tissue edema/hematoma.can not exclude phlegmon/infected fluid by noncontrast imaging.  c/s ID          Hx of afib   Rate controlled   INR 2.2 .Will resume  coumadin.f/u inr  On Warfarin 2.5mg (M/W/Sat); 5mg (Sun/T/Th/F)   Monitor INR     HTN/HLD/CAD  CHF(unknown type)  Plavix 75mg   Atorvastatin 80mg   Ranolazine 500mg bid   Metoprolol tartrate 100mg daily (as per her list)   Zetia 10mg    Lasix 40mg   TTE-No prior TTE    DM-2   Hold Jardiance 25mg   Levermir 10 units HS   Novolog 10units with meal   Insulin sliding scale           CKD-3   Cr. 1.6 (baseline 1.2-1.57)   Monitor renal function     Hypothyroidism   Synthroid 100mcg     Supportive   DVT prophylaxis: on Warfarin -hold for high inr  Diet: DASH/CHO    PT eval  disposition: active  Plan of care dw pt  dw id

## 2024-06-06 NOTE — PHYSICAL THERAPY INITIAL EVALUATION ADULT - GENERAL OBSERVATIONS, REHAB EVAL
received semi esquivel position in bed, NAD, agreeable to PT Cami Vann  Urology  [address] [place] [address] [address] [place] [id number]  Phone: [phone]  Fax: [phone]  Follow Up Time:

## 2024-06-07 ENCOUNTER — TRANSCRIPTION ENCOUNTER (OUTPATIENT)
Age: 89
End: 2024-06-07

## 2024-06-07 LAB
ANION GAP SERPL CALC-SCNC: 13 MMOL/L — SIGNIFICANT CHANGE UP (ref 5–17)
APTT BLD: 40 SEC — HIGH (ref 24.5–35.6)
BUN SERPL-MCNC: 32.2 MG/DL — HIGH (ref 8–20)
CALCIUM SERPL-MCNC: 8.4 MG/DL — SIGNIFICANT CHANGE UP (ref 8.4–10.5)
CHLORIDE SERPL-SCNC: 103 MMOL/L — SIGNIFICANT CHANGE UP (ref 96–108)
CO2 SERPL-SCNC: 22 MMOL/L — SIGNIFICANT CHANGE UP (ref 22–29)
CREAT SERPL-MCNC: 1.33 MG/DL — HIGH (ref 0.5–1.3)
EGFR: 39 ML/MIN/1.73M2 — LOW
GLUCOSE BLDC GLUCOMTR-MCNC: 143 MG/DL — HIGH (ref 70–99)
GLUCOSE BLDC GLUCOMTR-MCNC: 143 MG/DL — HIGH (ref 70–99)
GLUCOSE BLDC GLUCOMTR-MCNC: 228 MG/DL — HIGH (ref 70–99)
GLUCOSE BLDC GLUCOMTR-MCNC: 71 MG/DL — SIGNIFICANT CHANGE UP (ref 70–99)
GLUCOSE SERPL-MCNC: 132 MG/DL — HIGH (ref 70–99)
HCT VFR BLD CALC: 35.5 % — SIGNIFICANT CHANGE UP (ref 34.5–45)
HGB BLD-MCNC: 11.1 G/DL — LOW (ref 11.5–15.5)
INR BLD: 1.51 RATIO — HIGH (ref 0.85–1.18)
MCHC RBC-ENTMCNC: 25.5 PG — LOW (ref 27–34)
MCHC RBC-ENTMCNC: 31.3 GM/DL — LOW (ref 32–36)
MCV RBC AUTO: 81.6 FL — SIGNIFICANT CHANGE UP (ref 80–100)
PLATELET # BLD AUTO: 180 K/UL — SIGNIFICANT CHANGE UP (ref 150–400)
POTASSIUM SERPL-MCNC: 3.9 MMOL/L — SIGNIFICANT CHANGE UP (ref 3.5–5.3)
POTASSIUM SERPL-SCNC: 3.9 MMOL/L — SIGNIFICANT CHANGE UP (ref 3.5–5.3)
PROTHROM AB SERPL-ACNC: 16.6 SEC — HIGH (ref 9.5–13)
RBC # BLD: 4.35 M/UL — SIGNIFICANT CHANGE UP (ref 3.8–5.2)
RBC # FLD: 16.3 % — HIGH (ref 10.3–14.5)
SODIUM SERPL-SCNC: 138 MMOL/L — SIGNIFICANT CHANGE UP (ref 135–145)
WBC # BLD: 6.42 K/UL — SIGNIFICANT CHANGE UP (ref 3.8–10.5)
WBC # FLD AUTO: 6.42 K/UL — SIGNIFICANT CHANGE UP (ref 3.8–10.5)

## 2024-06-07 PROCEDURE — 99232 SBSQ HOSP IP/OBS MODERATE 35: CPT

## 2024-06-07 RX ORDER — ACETAMINOPHEN 500 MG
2 TABLET ORAL
Qty: 0 | Refills: 0 | DISCHARGE
Start: 2024-06-07

## 2024-06-07 RX ORDER — WARFARIN SODIUM 2.5 MG/1
5 TABLET ORAL ONCE
Refills: 0 | Status: COMPLETED | OUTPATIENT
Start: 2024-06-07 | End: 2024-06-07

## 2024-06-07 RX ORDER — CEPHALEXIN 500 MG
1 CAPSULE ORAL
Qty: 9 | Refills: 0
Start: 2024-06-07

## 2024-06-07 RX ADMIN — RANOLAZINE 500 MILLIGRAM(S): 500 TABLET, FILM COATED, EXTENDED RELEASE ORAL at 17:24

## 2024-06-07 RX ADMIN — Medication 3 MILLIGRAM(S): at 23:05

## 2024-06-07 RX ADMIN — Medication 2000 MILLIGRAM(S): at 05:13

## 2024-06-07 RX ADMIN — INSULIN GLARGINE 10 UNIT(S): 100 INJECTION, SOLUTION SUBCUTANEOUS at 22:02

## 2024-06-07 RX ADMIN — Medication 10 UNIT(S): at 12:09

## 2024-06-07 RX ADMIN — Medication 650 MILLIGRAM(S): at 15:52

## 2024-06-07 RX ADMIN — LISINOPRIL 10 MILLIGRAM(S): 2.5 TABLET ORAL at 05:14

## 2024-06-07 RX ADMIN — WARFARIN SODIUM 5 MILLIGRAM(S): 2.5 TABLET ORAL at 22:50

## 2024-06-07 RX ADMIN — Medication 2000 MILLIGRAM(S): at 17:24

## 2024-06-07 RX ADMIN — Medication 100 MILLIGRAM(S): at 05:14

## 2024-06-07 RX ADMIN — Medication 100 MICROGRAM(S): at 05:14

## 2024-06-07 RX ADMIN — Medication 40 MILLIGRAM(S): at 05:16

## 2024-06-07 RX ADMIN — CLOPIDOGREL BISULFATE 75 MILLIGRAM(S): 75 TABLET, FILM COATED ORAL at 12:03

## 2024-06-07 RX ADMIN — Medication 10 UNIT(S): at 08:37

## 2024-06-07 RX ADMIN — ATORVASTATIN CALCIUM 80 MILLIGRAM(S): 80 TABLET, FILM COATED ORAL at 22:50

## 2024-06-07 RX ADMIN — Medication 650 MILLIGRAM(S): at 16:52

## 2024-06-07 RX ADMIN — Medication 650 MILLIGRAM(S): at 23:05

## 2024-06-07 RX ADMIN — Medication 1000 UNIT(S): at 12:03

## 2024-06-07 RX ADMIN — RANOLAZINE 500 MILLIGRAM(S): 500 TABLET, FILM COATED, EXTENDED RELEASE ORAL at 05:14

## 2024-06-07 NOTE — PROGRESS NOTE ADULT - SUBJECTIVE AND OBJECTIVE BOX
[facility]  INFECTIOUS DISEASES at [place] / [place] / [place]  =======================================================                               Omero Cortes MD#   Marissa Blas MD*                             Danica Robertson MD*   Lyn Chavez MD*                              Professor Emeritus:  Dr Lavon Flores MD^            Diplomates American Board of Internal Medicine & Infectious Diseases                # [place] Office - Appt - Tel  [phone] Fax [phone]                * [place] Office - Appt - Tel [phone] Fax [phone]                      ^[place] Office - Tel  [phone] Fax [phone]                                  Hospital Consult line:  [phone]  =======================================================    KARL WINKLER [id number]    Follow up: Cellulitis    No complaints       Allergies:  Brilinta (Unknown)       REVIEW OF SYSTEMS:  CONSTITUTIONAL:  No Fever or chills  HEENT:  No diplopia or blurred vision.  No earache, sore throat or runny nose.  CARDIOVASCULAR:  No chest pain  RESPIRATORY:  No cough, shortness of breath  GASTROINTESTINAL:  No nausea, vomiting or diarrhea.  GENITOURINARY:  No dysuria, frequency or urgency. No Blood in urine  MUSCULOSKELETAL:  no joint aches, no muscle pain  SKIN:  Rt Foot erythema and swelling   NEUROLOGIC:  No Headaches      Physical Exam:  GEN: NAD  HEENT: normocephalic and atraumatic. EOMI. PERRL.    NECK: Supple.   LUNGS: CTA B/L.  HEART: RRR  ABDOMEN: Soft, NT, ND.  +BS.    : No CVA tenderness  EXTREMITIES: Without  edema.  MSK: No joint swelling  NEUROLOGIC: No Focal Deficits   SKIN: Rt Foot erythema and swelling, improved     Vitals:  T(F): 97.7 (07 Jun 2024 04:25), Max: 98.5 (07 Jun 2024 00:00)  HR: 78 (07 Jun 2024 04:25)  BP: 144/83 (07 Jun 2024 04:25)  RR: 18 (07 Jun 2024 04:25)  SpO2: 98% (07 Jun 2024 04:25) (94% - 99%)  temp max in last 48H T(F): , Max: 98.5 (06-07-24 @ 00:00)    Current Antibiotics:  ceFAZolin  Injectable. 2000 milliGRAM(s) IV Push every 12 hours    Other medications:  atorvastatin 80 milliGRAM(s) Oral at bedtime  cholecalciferol 1000 Unit(s) Oral daily  clopidogrel Tablet 75 milliGRAM(s) Oral daily  dextrose 10% Bolus 125 milliLiter(s) IV Bolus once  dextrose 5%. 1000 milliLiter(s) IV Continuous <Continuous>  dextrose 5%. 1000 milliLiter(s) IV Continuous <Continuous>  dextrose 50% Injectable 25 Gram(s) IV Push once  dextrose 50% Injectable 12.5 Gram(s) IV Push once  furosemide    Tablet 40 milliGRAM(s) Oral daily  glucagon  Injectable 1 milliGRAM(s) IntraMuscular once  insulin glargine Injectable (LANTUS) 10 Unit(s) SubCutaneous at bedtime  insulin lispro (ADMELOG) corrective regimen sliding scale   SubCutaneous at bedtime  insulin lispro (ADMELOG) corrective regimen sliding scale   SubCutaneous three times a day before meals  insulin lispro Injectable (ADMELOG) 10 Unit(s) SubCutaneous three times a day before meals  levothyroxine 100 MICROGram(s) Oral daily  lisinopril 10 milliGRAM(s) Oral daily  metoprolol tartrate 100 milliGRAM(s) Oral daily  ranolazine 500 milliGRAM(s) Oral two times a day               11.1   6.42  )-----------( 180      ( 07 Jun 2024 04:00 )             35.5     06-07    138  |  103  |  32.2<H>  ----------------------------<  132<H>  3.9   |  22.0  |  1.33<H>    Ca    8.4      07 Jun 2024 04:00      RECENT CULTURES:  06-04 @ 19:44 .Blood Blood-Peripheral     No growth at 48 Hours      WBC Count: 6.42 K/uL (06-07-24 @ 04:00)  WBC Count: 6.52 K/uL (06-06-24 @ 02:57)  WBC Count: 6.52 K/uL (06-05-24 @ 02:57)  WBC Count: 6.99 K/uL (06-04-24 @ 19:44)    Creatinine: 1.33 mg/dL (06-07-24 @ 04:00)  Creatinine: 1.66 mg/dL (06-06-24 @ 02:57)  Creatinine: 1.33 mg/dL (06-05-24 @ 02:57)  Creatinine: 1.37 mg/dL (06-04-24 @ 19:44)               
Patient is a 89y old  Female who presents with a chief complaint of     c/o  rt foot pain.no fever,chill  REVIEW OF SYSTEMS: All systems are reviewed and found to be negative except above    MEDICATIONS  (STANDING):  acetaminophen   IVPB .. 1000 milliGRAM(s) IV Intermittent once  atorvastatin 80 milliGRAM(s) Oral at bedtime  ceFAZolin  Injectable. 2000 milliGRAM(s) IV Push every 12 hours  cholecalciferol 1000 Unit(s) Oral daily  clopidogrel Tablet 75 milliGRAM(s) Oral daily  dextrose 10% Bolus 125 milliLiter(s) IV Bolus once  dextrose 5%. 1000 milliLiter(s) (100 mL/Hr) IV Continuous <Continuous>  dextrose 5%. 1000 milliLiter(s) (50 mL/Hr) IV Continuous <Continuous>  dextrose 50% Injectable 25 Gram(s) IV Push once  dextrose 50% Injectable 12.5 Gram(s) IV Push once  furosemide    Tablet 40 milliGRAM(s) Oral daily  glucagon  Injectable 1 milliGRAM(s) IntraMuscular once  insulin glargine Injectable (LANTUS) 10 Unit(s) SubCutaneous at bedtime  insulin lispro (ADMELOG) corrective regimen sliding scale   SubCutaneous three times a day before meals  insulin lispro (ADMELOG) corrective regimen sliding scale   SubCutaneous at bedtime  insulin lispro Injectable (ADMELOG) 10 Unit(s) SubCutaneous three times a day before meals  levothyroxine 100 MICROGram(s) Oral daily  lisinopril 10 milliGRAM(s) Oral daily  metoprolol tartrate 100 milliGRAM(s) Oral daily  ranolazine 500 milliGRAM(s) Oral two times a day  warfarin 2.5 milliGRAM(s) Oral once    MEDICATIONS  (PRN):  acetaminophen     Tablet .. 650 milliGRAM(s) Oral every 6 hours PRN Temp greater or equal to 38C (100.4F), Mild Pain (1 - 3)  ALPRAZolam 0.25 milliGRAM(s) Oral daily PRN for anxiety  aluminum hydroxide/magnesium hydroxide/simethicone Suspension 30 milliLiter(s) Oral every 4 hours PRN Dyspepsia  dextrose Oral Gel 15 Gram(s) Oral once PRN Blood Glucose LESS THAN 70 milliGRAM(s)/deciliter  melatonin 3 milliGRAM(s) Oral at bedtime PRN Insomnia  ondansetron Injectable 4 milliGRAM(s) IV Push every 8 hours PRN Nausea and/or Vomiting      CAPILLARY BLOOD GLUCOSE      POCT Blood Glucose.: 128 mg/dL (06 Jun 2024 12:03)  POCT Blood Glucose.: 169 mg/dL (06 Jun 2024 09:31)  POCT Blood Glucose.: 163 mg/dL (05 Jun 2024 22:41)  POCT Blood Glucose.: 78 mg/dL (05 Jun 2024 17:06)  POCT Blood Glucose.: 68 mg/dL (05 Jun 2024 17:05)    I&O's Summary      PHYSICAL EXAM:  Vital Signs Last 24 Hrs  T(C): 36.5 (06 Jun 2024 11:03), Max: 36.8 (05 Jun 2024 18:27)  T(F): 97.7 (06 Jun 2024 11:03), Max: 98.2 (05 Jun 2024 18:27)  HR: 57 (06 Jun 2024 11:03) (57 - 74)  BP: 121/71 (06 Jun 2024 11:03) (100/60 - 137/80)  BP(mean): --  RR: 18 (06 Jun 2024 11:03) (18 - 18)  SpO2: 99% (06 Jun 2024 11:03) (95% - 99%)    Parameters below as of 06 Jun 2024 11:03  Patient On (Oxygen Delivery Method): room air        CONSTITUTIONAL: NAD,  EYES: PERRLA; conjunctiva and sclera clear  ENMT: Moist oral mucosa,   RESPIRATORY: Normal respiratory effort; lungs are clear to auscultation bilaterally  CARDIOVASCULAR: Regular rate and rhythm, normal S1 and S2, no murmur   EXTS: rt foot: dorsum - swelling, tender, mild erythema ; Peripheral pulses are 2+ bilaterally  ABDOMEN: Nontender to palpation, normoactive bowel sounds, no rebound/guarding;   MUSCLOSKELETAL:  no joint swelling or tenderness to palpation  PSYCH: affect appropriate  NEUROLOGY: A+O to person, place, and time; CN 2-12 are intact and symmetric; no gross sensory deficits;       LABS:                        11.2   6.52  )-----------( 195      ( 06 Jun 2024 02:57 )             35.6     06-06    137  |  101  |  38.5<H>  ----------------------------<  137<H>  4.2   |  22.0  |  1.66<H>    Ca    8.3<L>      06 Jun 2024 02:57    TPro  6.7  /  Alb  4.0  /  TBili  0.5  /  DBili  x   /  AST  13  /  ALT  9   /  AlkPhos  80  06-04    PT/INR - ( 06 Jun 2024 02:57 )   PT: 23.9 sec;   INR: 2.20 ratio         PTT - ( 06 Jun 2024 02:57 )  PTT:48.3 sec      Urinalysis Basic - ( 06 Jun 2024 02:57 )    Color: x / Appearance: x / SG: x / pH: x  Gluc: 137 mg/dL / Ketone: x  / Bili: x / Urobili: x   Blood: x / Protein: x / Nitrite: x   Leuk Esterase: x / RBC: x / WBC x   Sq Epi: x / Non Sq Epi: x / Bacteria: x        Culture - Blood (collected 04 Jun 2024 19:44)  Source: .Blood Blood-Peripheral  Preliminary Report (06 Jun 2024 02:02):    No growth at 24 hours    Culture - Blood (collected 04 Jun 2024 19:44)  Source: .Blood Blood-Peripheral  Preliminary Report (06 Jun 2024 02:02):    No growth at 24 hours        RADIOLOGY & ADDITIONAL TESTS:  Results Reviewed:     
Patient is a 89y old  Female who presents with a chief complaint of     pt is c/o  rt foot pain. denies fever,chill  REVIEW OF SYSTEMS: All systems are reviewed and found to be negative except above    MEDICATIONS  (STANDING):  atorvastatin 80 milliGRAM(s) Oral at bedtime  ceFAZolin  Injectable. 2000 milliGRAM(s) IV Push every 12 hours  cholecalciferol 1000 Unit(s) Oral daily  clopidogrel Tablet 75 milliGRAM(s) Oral daily  dextrose 10% Bolus 125 milliLiter(s) IV Bolus once  dextrose 5%. 1000 milliLiter(s) (100 mL/Hr) IV Continuous <Continuous>  dextrose 5%. 1000 milliLiter(s) (50 mL/Hr) IV Continuous <Continuous>  dextrose 50% Injectable 25 Gram(s) IV Push once  dextrose 50% Injectable 12.5 Gram(s) IV Push once  furosemide    Tablet 40 milliGRAM(s) Oral daily  glucagon  Injectable 1 milliGRAM(s) IntraMuscular once  insulin glargine Injectable (LANTUS) 10 Unit(s) SubCutaneous at bedtime  insulin lispro (ADMELOG) corrective regimen sliding scale   SubCutaneous at bedtime  insulin lispro (ADMELOG) corrective regimen sliding scale   SubCutaneous three times a day before meals  insulin lispro Injectable (ADMELOG) 10 Unit(s) SubCutaneous three times a day before meals  levothyroxine 100 MICROGram(s) Oral daily  lisinopril 10 milliGRAM(s) Oral daily  metoprolol tartrate 100 milliGRAM(s) Oral daily  ranolazine 500 milliGRAM(s) Oral two times a day  warfarin 5 milliGRAM(s) Oral once    MEDICATIONS  (PRN):  acetaminophen     Tablet .. 650 milliGRAM(s) Oral every 6 hours PRN Temp greater or equal to 38C (100.4F), Mild Pain (1 - 3)  ALPRAZolam 0.25 milliGRAM(s) Oral daily PRN for anxiety  aluminum hydroxide/magnesium hydroxide/simethicone Suspension 30 milliLiter(s) Oral every 4 hours PRN Dyspepsia  dextrose Oral Gel 15 Gram(s) Oral once PRN Blood Glucose LESS THAN 70 milliGRAM(s)/deciliter  melatonin 3 milliGRAM(s) Oral at bedtime PRN Insomnia  ondansetron Injectable 4 milliGRAM(s) IV Push every 8 hours PRN Nausea and/or Vomiting      CAPILLARY BLOOD GLUCOSE      POCT Blood Glucose.: 143 mg/dL (07 Jun 2024 12:07)  POCT Blood Glucose.: 143 mg/dL (07 Jun 2024 08:36)  POCT Blood Glucose.: 163 mg/dL (06 Jun 2024 22:15)  POCT Blood Glucose.: 166 mg/dL (06 Jun 2024 16:58)    I&O's Summary    06 Jun 2024 07:01  -  07 Jun 2024 07:00  --------------------------------------------------------  IN: 0 mL / OUT: 350 mL / NET: -350 mL        PHYSICAL EXAM:  Vital Signs Last 24 Hrs  T(C): 36.6 (07 Jun 2024 09:05), Max: 36.9 (06 Jun 2024 19:54)  T(F): 97.9 (07 Jun 2024 09:05), Max: 98.5 (07 Jun 2024 00:00)  HR: 76 (07 Jun 2024 09:05) (76 - 88)  BP: 106/66 (07 Jun 2024 09:05) (101/63 - 153/92)  BP(mean): --  RR: 19 (07 Jun 2024 09:05) (18 - 19)  SpO2: 97% (07 Jun 2024 09:05) (94% - 98%)    Parameters below as of 07 Jun 2024 09:05  Patient On (Oxygen Delivery Method): room air        CONSTITUTIONAL: NAD,  EYES: PERRLA; conjunctiva and sclera clear  ENMT: Moist oral mucosa,   RESPIRATORY: Normal respiratory effort; lungs are clear to auscultation bilaterally  CARDIOVASCULAR: Regular rate and rhythm, normal S1 and S2, no murmur   EXTS: rt foot- swelling,tender.  Peripheral pulses are 2+ bilaterally  ABDOMEN: Nontender to palpation, normoactive bowel sounds, no rebound/guarding;   PSYCH: affect appropriate  NEUROLOGY: A+O to person, place, and time; CN 2-12 are intact and symmetric; no gross sensory deficits;       LABS:                        11.1   6.42  )-----------( 180      ( 07 Jun 2024 04:00 )             35.5     06-07    138  |  103  |  32.2<H>  ----------------------------<  132<H>  3.9   |  22.0  |  1.33<H>    Ca    8.4      07 Jun 2024 04:00      PT/INR - ( 07 Jun 2024 04:00 )   PT: 16.6 sec;   INR: 1.51 ratio         PTT - ( 07 Jun 2024 04:00 )  PTT:40.0 sec      Urinalysis Basic - ( 07 Jun 2024 04:00 )    Color: x / Appearance: x / SG: x / pH: x  Gluc: 132 mg/dL / Ketone: x  / Bili: x / Urobili: x   Blood: x / Protein: x / Nitrite: x   Leuk Esterase: x / RBC: x / WBC x   Sq Epi: x / Non Sq Epi: x / Bacteria: x        Culture - Blood (collected 04 Jun 2024 19:44)  Source: .Blood Blood-Peripheral  Preliminary Report (07 Jun 2024 02:01):    No growth at 48 Hours    Culture - Blood (collected 04 Jun 2024 19:44)  Source: .Blood Blood-Peripheral  Preliminary Report (07 Jun 2024 02:01):    No growth at 48 Hours        RADIOLOGY & ADDITIONAL TESTS:  Results Reviewed:     
Patient is a 89y old  Female who presents with a chief complaint of     c/o rt foot pain,swelling s/p trauma  denies fever,chill.  REVIEW OF SYSTEMS: All systems are reviewed and found to be negative except above    MEDICATIONS  (STANDING):  acetaminophen   IVPB .. 1000 milliGRAM(s) IV Intermittent once  atorvastatin 80 milliGRAM(s) Oral at bedtime  ceFAZolin  Injectable. 1000 milliGRAM(s) IV Push every 8 hours  cholecalciferol 1000 Unit(s) Oral daily  clopidogrel Tablet 75 milliGRAM(s) Oral daily  dextrose 10% Bolus 125 milliLiter(s) IV Bolus once  dextrose 5%. 1000 milliLiter(s) (100 mL/Hr) IV Continuous <Continuous>  dextrose 5%. 1000 milliLiter(s) (50 mL/Hr) IV Continuous <Continuous>  dextrose 50% Injectable 12.5 Gram(s) IV Push once  dextrose 50% Injectable 25 Gram(s) IV Push once  furosemide    Tablet 40 milliGRAM(s) Oral daily  glucagon  Injectable 1 milliGRAM(s) IntraMuscular once  insulin glargine Injectable (LANTUS) 10 Unit(s) SubCutaneous at bedtime  insulin lispro (ADMELOG) corrective regimen sliding scale   SubCutaneous at bedtime  insulin lispro (ADMELOG) corrective regimen sliding scale   SubCutaneous three times a day before meals  insulin lispro Injectable (ADMELOG) 10 Unit(s) SubCutaneous three times a day before meals  levothyroxine 100 MICROGram(s) Oral daily  lisinopril 10 milliGRAM(s) Oral daily  metoprolol tartrate 100 milliGRAM(s) Oral daily  ranolazine 500 milliGRAM(s) Oral two times a day    MEDICATIONS  (PRN):  acetaminophen     Tablet .. 650 milliGRAM(s) Oral every 6 hours PRN Temp greater or equal to 38C (100.4F), Mild Pain (1 - 3)  ALPRAZolam 0.25 milliGRAM(s) Oral daily PRN for anxiety  aluminum hydroxide/magnesium hydroxide/simethicone Suspension 30 milliLiter(s) Oral every 4 hours PRN Dyspepsia  dextrose Oral Gel 15 Gram(s) Oral once PRN Blood Glucose LESS THAN 70 milliGRAM(s)/deciliter  melatonin 3 milliGRAM(s) Oral at bedtime PRN Insomnia  ondansetron Injectable 4 milliGRAM(s) IV Push every 8 hours PRN Nausea and/or Vomiting      CAPILLARY BLOOD GLUCOSE      POCT Blood Glucose.: 321 mg/dL (05 Jun 2024 12:46)  POCT Blood Glucose.: 245 mg/dL (05 Jun 2024 10:11)  POCT Blood Glucose.: 166 mg/dL (05 Jun 2024 01:37)    I&O's Summary      PHYSICAL EXAM:  Vital Signs Last 24 Hrs  T(C): 36.6 (05 Jun 2024 11:21), Max: 36.6 (04 Jun 2024 15:49)  T(F): 97.9 (05 Jun 2024 11:21), Max: 97.9 (05 Jun 2024 04:30)  HR: 65 (05 Jun 2024 11:21) (59 - 95)  BP: 107/65 (05 Jun 2024 11:21) (107/65 - 155/83)  BP(mean): --  RR: 18 (05 Jun 2024 11:21) (15 - 20)  SpO2: 97% (05 Jun 2024 11:21) (95% - 98%)    Parameters below as of 05 Jun 2024 11:21  Patient On (Oxygen Delivery Method): room air        CONSTITUTIONAL: NAD,  EYES: PERRLA; conjunctiva and sclera clear  ENMT: Moist oral mucosa,   RESPIRATORY: Normal respiratory effort; lungs are clear to auscultation bilaterally  CARDIOVASCULAR: , normal S1 and S2, no murmur   EXTS: + rt foot erythema,swelling,tender,firm,o open area see. Peripheral pulses are 2+ bilaterally  ABDOMEN: Nontender to palpation, normoactive bowel sounds, no rebound/guarding;   MUSCLOSKELETAL:  no joint swelling or tenderness to palpation  PSYCH: affect appropriate  NEUROLOGY: A+O to person, place, and time; CN 2-12 are intact and symmetric; no gross sensory deficits;       LABS:                        11.2   6.52  )-----------( 187      ( 05 Jun 2024 02:57 )             36.0     06-05    136  |  100  |  32.2<H>  ----------------------------<  172<H>  4.2   |  23.0  |  1.33<H>    Ca    8.6      05 Jun 2024 02:57    TPro  6.7  /  Alb  4.0  /  TBili  0.5  /  DBili  x   /  AST  13  /  ALT  9   /  AlkPhos  80  06-04    PT/INR - ( 05 Jun 2024 02:57 )   PT: 39.7 sec;   INR: 3.72 ratio         PTT - ( 05 Jun 2024 02:57 )  PTT:54.0 sec      Urinalysis Basic - ( 05 Jun 2024 02:57 )    Color: x / Appearance: x / SG: x / pH: x  Gluc: 172 mg/dL / Ketone: x  / Bili: x / Urobili: x   Blood: x / Protein: x / Nitrite: x   Leuk Esterase: x / RBC: x / WBC x   Sq Epi: x / Non Sq Epi: x / Bacteria: x          RADIOLOGY & ADDITIONAL TESTS:  Results Reviewed:   Imaging Personally Reviewed:  Electrocardiogram Personally Reviewed:    COORDINATION OF CARE:  Care Discussed with Consultants/Other Providers [Y/N]:  Prior or Outpatient Records Reviewed [Y/N]:

## 2024-06-07 NOTE — PROGRESS NOTE ADULT - TIME BILLING
This includes chart review, patient assessment, discussion with Dr Driscoll. Antibiotic dosing and management with clinical pharmacy.

## 2024-06-07 NOTE — DISCHARGE NOTE PROVIDER - PROVIDER TOKENS
FREE:[LAST:[PMD],PHONE:[(   )    -],FAX:[(   )    -]] FREE:[LAST:[PMD],PHONE:[(   )    -],FAX:[(   )    -]],PROVIDER:[TOKEN:[[id number]]],PROVIDER:[TOKEN:[[id number]]]

## 2024-06-07 NOTE — DISCHARGE NOTE PROVIDER - NSDCMRMEDTOKEN_GEN_ALL_CORE_FT
atorvastatin 80 mg oral tablet: 1 tab(s) orally once a day (at bedtime)  cholecalciferol 25 mcg (1000 intl units) oral tablet: 1 tab(s) orally once a day  clopidogrel 75 mg oral tablet: 1 tab(s) orally once a day  Jardiance 25 mg oral tablet: 1 tab(s) orally once a day  Lasix 40 mg oral tablet: 1 tab(s) orally once a day  Levemir FlexPen 100 units/mL subcutaneous solution: 10 unit(s) subcutaneous once a day (at bedtime)  levothyroxine 100 mcg (0.1 mg) oral tablet: 1 tab(s) orally once a day  Metoprolol Tartrate 100 mg oral tablet: 1 tab(s) orally once a day  NovoLOG 100 units/mL injectable solution: 10 unit(s) injectable 3 times a day  ramipril 2.5 mg oral tablet: orally once a day  ranolazine 500 mg oral granule, extended release: orally 2 times a day  warfarin 2.5 mg oral tablet: 1 tab(s) orally once a day (at bedtime)  warfarin 5 mg oral tablet: 1 tab(s) orally once a day (at bedtime)  Xanax 0.25 mg oral tablet: 1 tab(s) orally once a day as needed for  anxiety  Zetia 10 mg oral tablet: 1 tab(s) orally once a day (at bedtime)   acetaminophen 325 mg oral tablet: 2 tab(s) orally every 6 hours As needed Temp greater or equal to 38C (100.4F), Mild Pain (1 - 3)  atorvastatin 80 mg oral tablet: 1 tab(s) orally once a day (at bedtime)  cephalexin 500 mg oral tablet: 1 tab(s) orally 3 times a day till 06/10/24  cholecalciferol 25 mcg (1000 intl units) oral tablet: 1 tab(s) orally once a day  clopidogrel 75 mg oral tablet: 1 tab(s) orally once a day  Jardiance 25 mg oral tablet: 1 tab(s) orally once a day  Lasix 40 mg oral tablet: 1 tab(s) orally once a day  Levemir FlexPen 100 units/mL subcutaneous solution: 10 unit(s) subcutaneous once a day (at bedtime)  levothyroxine 100 mcg (0.1 mg) oral tablet: 1 tab(s) orally once a day  Metoprolol Tartrate 100 mg oral tablet: 1 tab(s) orally once a day  NovoLOG 100 units/mL injectable solution: 10 unit(s) injectable 3 times a day  ramipril 2.5 mg oral tablet: orally once a day  ranolazine 500 mg oral granule, extended release: orally 2 times a day  warfarin 2.5 mg oral tablet: 1 tab(s) orally once a day (at bedtime)  warfarin 5 mg oral tablet: 1 tab(s) orally once a day (at bedtime)  Xanax 0.25 mg oral tablet: 1 tab(s) orally once a day as needed for  anxiety  Zetia 10 mg oral tablet: 1 tab(s) orally once a day (at bedtime)   acetaminophen 325 mg oral tablet: 2 tab(s) orally every 6 hours As needed Temp greater or equal to 38C (100.4F), Mild Pain (1 - 3)  atorvastatin 80 mg oral tablet: 1 tab(s) orally once a day (at bedtime)  cephalexin 500 mg oral tablet: 1 tab(s) orally 3 times a day till 06/10/24  cholecalciferol 25 mcg (1000 intl units) oral tablet: 1 tab(s) orally once a day  clopidogrel 75 mg oral tablet: 1 tab(s) orally once a day  Jardiance 25 mg oral tablet: 1 tab(s) orally once a day  Lasix 40 mg oral tablet: 1 tab(s) orally once a day  Levemir FlexPen 100 units/mL subcutaneous solution: 10 unit(s) subcutaneous once a day (at bedtime)  levothyroxine 100 mcg (0.1 mg) oral tablet: 1 tab(s) orally once a day  Metoprolol Tartrate 100 mg oral tablet: 1 tab(s) orally once a day  NovoLOG 100 units/mL injectable solution: 10 unit(s) injectable 3 times a day  ramipril 2.5 mg oral tablet: orally once a day  ranolazine 500 mg oral granule, extended release: orally 2 times a day  traMADol 25 mg oral tablet: 1 tab(s) orally every 8 hours as needed for moderate pain  warfarin 2.5 mg oral tablet: 1 tab(s) orally once a day (at bedtime)  warfarin 5 mg oral tablet: 1 tab(s) orally once a day (at bedtime)  Zetia 10 mg oral tablet: 1 tab(s) orally once a day (at bedtime)

## 2024-06-07 NOTE — DISCHARGE NOTE PROVIDER - HOSPITAL COURSE
89 y/o female with PMH of A-fib on Coumadin, CAD s/p stents, HTN, HLD, DM-2, hypothyroidism was sent to the ED by PCP for evaluation of right foot cellulitis. Patient drooped a jar on her foot 2 weeks ago, developed hematoma, seen by PCP who prescribed Doxycycline x 7days without improvement. She got outpatient MRI to rule out fracture/OM; showed cellulitis surrounding a hematoma. PCP sent to the ED for IV antibiotic. Pt admitted for Right foot cellulitis failed outpatient therapy ,severe soft tissue edema/hematoma sec trauma  s/p  iv ancef, changed to keflex on discharge till 06/10/24.MRI foot w/o contrast  05/30/24  from outpatient facility reporting no OM or acute fx. severe soft tissue edema/hematoma.can not exclude phlegmon/infected fluid by noncontrast imaging.seen by ID.Blood c/s ngtd.Hb stable. Pt is on coumadin for AFib  coumadin was on hold for supratherpeutic inr,resume 06/06.Home--On Warfarin 2.5mg (M/W/Sat); 5mg (Sun/T/Th/F).Monitor INR 89y/oF PMH of A-fib on Coumadin, CAD s/p stents, HTN, HLD, DM-2, hypothyroidism was sent to the ED by PCP for evaluation of right foot cellulitis. Patient drooped a jar on her foot 2 weeks ago, developed hematoma, seen by PCP who prescribed Doxycycline x 7days without improvement. She got outpatient MRI to rule out fracture/OM; showed cellulitis surrounding a hematoma. PCP sent to the ED for IV antibiotic. Pt admitted for Right foot cellulitis failed outpatient therapy ,severe soft tissue edema/hematoma sec trauma  s/p  iv ancef, changed to keflex on discharge till 06/10/24. MRI foot w/o contrast  05/30/24  from outpatient facility reporting no OM or acute fx. severe soft tissue edema/hematoma.can not exclude phlegmon/infected fluid by noncontrast imaging. seen by ID. Blood c/s ngtd.Hb stable. Pt is on coumadin for AFib  coumadin was on hold for supratherpeutic inr, now resumed 06/06. home regimen: Warfarin 2.5mg (M/W/Sat); 5mg (Sun/T/Th/F). Monitor INR. pt to dc to [facility] 89y/oF PMH of A-fib on Coumadin, CAD s/p stents, HTN, HLD, DM-2, hypothyroidism was sent to the ED by PCP for evaluation of right foot cellulitis. Patient drooped a jar on her foot 2 weeks ago, developed hematoma, seen by PCP who prescribed Doxycycline x 7days without improvement. She got outpatient MRI to rule out fracture/OM; showed cellulitis surrounding a hematoma. PCP sent to the ED for IV antibiotic. Pt admitted for Right foot cellulitis failed outpatient therapy ,severe soft tissue edema/hematoma sec trauma  s/p  iv ancef, changed to keflex on discharge till 06/10/24. MRI foot w/o contrast  05/30/24  from outpatient facility reporting no OM or acute fx. severe soft tissue edema/hematoma.can not exclude phlegmon/infected fluid by noncontrast imaging. seen by ID. Blood c/s ngtd. Hb stable. Pt is on coumadin for AFib  coumadin was on hold for supratherapeutic inr, now resumed 06/06. home regimen: Warfarin 2.5mg (M/W/Sat); 5mg (Sun/T/Th/F). Monitor INR. pt to dc to [facility]

## 2024-06-07 NOTE — PROGRESS NOTE ADULT - ASSESSMENT
88 y/o female with PMH of A-fib on Coumadin, CAD s/p stents, HTN, HLD, DM-2, hypothyroidism was sent to the ED by PCP for evaluation of right foot cellulitis. Patient drooped a jar on her foot 2 weeks ago, developed hematoma, seen by PCP who prescribed Doxycycline x 7days without improvement. She got outpatient MRI to rule out fracture/OM; showed cellulitis surrounding a hematoma. PCP sent to the ED for IV antibiotic.     Right foot cellulitis failed outpatient therapy   severe soft tissue edema/hematoma sec trauma  Continue iv ancef  Pain control   Blood culture sent, follow up and adjust antibiotic as needed.   MRI foot w/o contrast  05/30/24  from outpatient facility reporting no OM or acute fx. severe soft tissue edema/hematoma.can not exclude phlegmon/infected fluid by noncontrast imaging.  per ID can DC with Keflex. till 6/10/24  -pt rec wendy          Hx of afib   Rate controlled   coumadin was on hold for supratherpeutic inr,resume 06/06  INR 1.5.Will give 5 mg coumadin today  .f/u f/u inr  Home--On Warfarin 2.5mg (M/W/Sat); 5mg (Sun/T/Th/F)   Monitor INR     HTN/HLD/CAD  CHF(unknown type)  Plavix 75mg   Atorvastatin 80mg   Ranolazine 500mg bid   Metoprolol tartrate 100mg daily (as per her list)   Zetia 10mg    Lasix 40mg   TTE-No prior TTE    DM-2   Hold Jardiance 25mg   Levermir 10 units HS   Novolog 10units with meal   Insulin sliding scale           CKD-3   Cr. 1.5 (baseline 1.2-1.57)   Monitor renal function     Hypothyroidism   Synthroid 100mcg     Supportive   DVT prophylaxis: on Warfarin -hold for high inr  Diet: DASH/CHO    PT eval  disposition: WENDY MCGREGOR for safe discharge  Plan of care dw pt     88 y/o female with PMH of A-fib on Coumadin, CAD s/p stents, HTN, HLD, DM-2, hypothyroidism was sent to the ED by PCP for evaluation of right foot cellulitis. Patient drooped a jar on her foot 2 weeks ago, developed hematoma, seen by PCP who prescribed Doxycycline x 7days without improvement. She got outpatient MRI to rule out fracture/OM; showed cellulitis surrounding a hematoma. PCP sent to the ED for IV antibiotic.     Right foot cellulitis failed outpatient therapy   severe soft tissue edema/hematoma sec trauma  Continue iv ancef  Pain control   Blood culture sent, follow up and adjust antibiotic as needed.   MRI foot w/o contrast  05/30/24  from outpatient facility reporting no OM or acute fx. severe soft tissue edema/hematoma.can not exclude phlegmon/infected fluid by noncontrast imaging.  per ID can DC with Keflex. till 6/10/24  -pt rec wendy          Hx of afib   Rate controlled   coumadin was on hold for supratherpeutic inr,resume 06/06  INR 1.5.Will give 5 mg coumadin today  .f/u f/u inr  Home--On Warfarin 2.5mg (M/W/Sat); 5mg (Sun/T/Th/F)   Monitor INR     HTN/HLD/CAD  CHF(unknown type)  Plavix 75mg   Atorvastatin 80mg   Ranolazine 500mg bid   Metoprolol tartrate 100mg daily (as per her list)   Zetia 10mg    Lasix 40mg   TTE-No prior TTE    DM-2   Hold Jardiance 25mg   Levermir 10 units HS   Novolog 10units with meal   Insulin sliding scale           CKD-3   Cr. 1.5 (baseline 1.2-1.57)   Monitor renal function     Hypothyroidism   Synthroid 100mcg     Supportive   DVT prophylaxis: on Warfarin -hold for high inr  Diet: DASH/CHO    PT eval  disposition: WENDY MCGREGOR for safe discharge  Plan of care alirio pt. Pt agreed with WENDY.

## 2024-06-07 NOTE — PROGRESS NOTE ADULT - ASSESSMENT
89y  Female with h/o A-fib, CAD s/p stents, HTN, HLD, DM-2, hypothyroidism. Patient was sent to the ED by PCP for evaluation of right foot cellulitis. Patient dropped a jar on her foot 2 weeks ago, developed hematoma, seen by PCP who prescribed Doxycycline x 7days without improvement. She got outpatient MRI to rule out fracture/OM; showed cellulitis surrounding a hematoma. PCP sent to the ED for IV antibiotic. She has no fever, chills, nausea, vomiting, chest pain, shortness of breath, palpitation, abdominal pain, change in bowel/urinary habit. Patient has been afebrile, no leukocytosis. Has been on IV Cefazolin.      Rt Foot Cellulitis   RACHEL       - MRI from outpatient facility reporting no OM or acute findings.   - Blood cultures 6/4 no growth   - XR Rt foot with no acute findings   - Continue Cefazolin, dose adjusted to renal function  - On D/C can switch to Keflex.   - Last day of antibiotics 6/10/24  - Need Vascular surgery eval, can be outpatient.   - Hold off on PICC/Midline for now unless needed for reasons other than infectious diseases  - Follow up cultures  - Trend Fever  - Trend WBC      Will sign off. Please call PRN.        Discussed treatment plan with: Dr Driscoll

## 2024-06-07 NOTE — DISCHARGE NOTE PROVIDER - CARE PROVIDER_API CALL
PMD,   Phone: (   )    -  Fax: (   )    -  Follow Up Time:    PATY,   Phone: (   )    -  Fax: (   )    -  Follow Up Time:     Bryn Lilly  Podiatric Medicine and Surgery  [address] [place] [id number]  Phone: [phone]  Fax: [phone]  Follow Up Time:     Robert Lind  Surgery  [address] [place] [address], [address]  [place] [address]  Phone: [phone]  Fax: [phone]  Follow Up Time:

## 2024-06-07 NOTE — DISCHARGE NOTE PROVIDER - CARE PROVIDERS DIRECT ADDRESSES
,DirectAddress_Unknown ,DirectAddress_Unknown,[email or web address],DirectAddress_Unknown

## 2024-06-07 NOTE — DISCHARGE NOTE PROVIDER - ATTENDING DISCHARGE PHYSICAL EXAMINATION:
Vital Signs Last 24 Hrs  T(C): 36.7 (08 Jun 2024 09:31), Max: 36.8 (07 Jun 2024 15:55)  T(F): 98 (08 Jun 2024 09:31), Max: 98.2 (07 Jun 2024 15:55)  HR: 62 (08 Jun 2024 09:31) (62 - 90)  BP: 136/75 (08 Jun 2024 09:31) (112/73 - 141/65)  BP(mean): 86 (07 Jun 2024 23:00) (86 - 86)  RR: 18 (08 Jun 2024 09:31) (16 - 18)  SpO2: 96% (08 Jun 2024 09:31) (95% - 96%)    Parameters below as of 08 Jun 2024 09:31  Patient On (Oxygen Delivery Method): room air    CONSTITUTIONAL: NAD  CARDIAC: Regular rate, regular rhythm.  normal +S1, S2  RESPIRATORY: Clear to auscultation bilaterally  GASTROINTESTINAL: Abdomen soft, non-tender, no guarding  NEUROLOGICAL: Awake, alert  SKIN: warm, dry

## 2024-06-07 NOTE — DISCHARGE NOTE PROVIDER - NSDCCPCAREPLAN_GEN_ALL_CORE_FT
PRINCIPAL DISCHARGE DIAGNOSIS  Diagnosis: Cellulitis  Assessment and Plan of Treatment:       SECONDARY DISCHARGE DIAGNOSES  Diagnosis: Hematoma  Assessment and Plan of Treatment:     Diagnosis: DM (diabetes mellitus)  Assessment and Plan of Treatment:     Diagnosis: HTN (hypertension)  Assessment and Plan of Treatment:     Diagnosis: Chronic atrial fibrillation  Assessment and Plan of Treatment:      PRINCIPAL DISCHARGE DIAGNOSIS  Diagnosis: Cellulitis  Assessment and Plan of Treatment: complete course of antibiotics      SECONDARY DISCHARGE DIAGNOSES  Diagnosis: Hematoma  Assessment and Plan of Treatment:     Diagnosis: DM (diabetes mellitus)  Assessment and Plan of Treatment:     Diagnosis: HTN (hypertension)  Assessment and Plan of Treatment:     Diagnosis: Chronic atrial fibrillation  Assessment and Plan of Treatment:

## 2024-06-08 ENCOUNTER — TRANSCRIPTION ENCOUNTER (OUTPATIENT)
Age: 89
End: 2024-06-08

## 2024-06-08 VITALS
DIASTOLIC BLOOD PRESSURE: 91 MMHG | HEART RATE: 79 BPM | OXYGEN SATURATION: 99 % | RESPIRATION RATE: 18 BRPM | SYSTOLIC BLOOD PRESSURE: 152 MMHG | TEMPERATURE: 98 F

## 2024-06-08 LAB
ANION GAP SERPL CALC-SCNC: 14 MMOL/L — SIGNIFICANT CHANGE UP (ref 5–17)
APTT BLD: 38.5 SEC — HIGH (ref 24.5–35.6)
BUN SERPL-MCNC: 20.5 MG/DL — HIGH (ref 8–20)
CALCIUM SERPL-MCNC: 8.1 MG/DL — LOW (ref 8.4–10.5)
CHLORIDE SERPL-SCNC: 104 MMOL/L — SIGNIFICANT CHANGE UP (ref 96–108)
CO2 SERPL-SCNC: 22 MMOL/L — SIGNIFICANT CHANGE UP (ref 22–29)
CREAT SERPL-MCNC: 1.17 MG/DL — SIGNIFICANT CHANGE UP (ref 0.5–1.3)
EGFR: 45 ML/MIN/1.73M2 — LOW
GLUCOSE BLDC GLUCOMTR-MCNC: 162 MG/DL — HIGH (ref 70–99)
GLUCOSE BLDC GLUCOMTR-MCNC: 186 MG/DL — HIGH (ref 70–99)
GLUCOSE BLDC GLUCOMTR-MCNC: 94 MG/DL — SIGNIFICANT CHANGE UP (ref 70–99)
GLUCOSE SERPL-MCNC: 198 MG/DL — HIGH (ref 70–99)
HCT VFR BLD CALC: 35.4 % — SIGNIFICANT CHANGE UP (ref 34.5–45)
HGB BLD-MCNC: 11.2 G/DL — LOW (ref 11.5–15.5)
INR BLD: 1.47 RATIO — HIGH (ref 0.85–1.18)
MCHC RBC-ENTMCNC: 25.9 PG — LOW (ref 27–34)
MCHC RBC-ENTMCNC: 31.6 GM/DL — LOW (ref 32–36)
MCV RBC AUTO: 81.8 FL — SIGNIFICANT CHANGE UP (ref 80–100)
PLATELET # BLD AUTO: 179 K/UL — SIGNIFICANT CHANGE UP (ref 150–400)
POTASSIUM SERPL-MCNC: 4 MMOL/L — SIGNIFICANT CHANGE UP (ref 3.5–5.3)
POTASSIUM SERPL-SCNC: 4 MMOL/L — SIGNIFICANT CHANGE UP (ref 3.5–5.3)
PROTHROM AB SERPL-ACNC: 16.1 SEC — HIGH (ref 9.5–13)
RBC # BLD: 4.33 M/UL — SIGNIFICANT CHANGE UP (ref 3.8–5.2)
RBC # FLD: 16.5 % — HIGH (ref 10.3–14.5)
SODIUM SERPL-SCNC: 140 MMOL/L — SIGNIFICANT CHANGE UP (ref 135–145)
WBC # BLD: 6.3 K/UL — SIGNIFICANT CHANGE UP (ref 3.8–10.5)
WBC # FLD AUTO: 6.3 K/UL — SIGNIFICANT CHANGE UP (ref 3.8–10.5)

## 2024-06-08 PROCEDURE — 84295 ASSAY OF SERUM SODIUM: CPT

## 2024-06-08 PROCEDURE — 82330 ASSAY OF CALCIUM: CPT

## 2024-06-08 PROCEDURE — 85025 COMPLETE CBC W/AUTO DIFF WBC: CPT

## 2024-06-08 PROCEDURE — 85730 THROMBOPLASTIN TIME PARTIAL: CPT

## 2024-06-08 PROCEDURE — 82435 ASSAY OF BLOOD CHLORIDE: CPT

## 2024-06-08 PROCEDURE — 84132 ASSAY OF SERUM POTASSIUM: CPT

## 2024-06-08 PROCEDURE — 87640 STAPH A DNA AMP PROBE: CPT

## 2024-06-08 PROCEDURE — 96368 THER/DIAG CONCURRENT INF: CPT

## 2024-06-08 PROCEDURE — 36415 COLL VENOUS BLD VENIPUNCTURE: CPT

## 2024-06-08 PROCEDURE — 99285 EMERGENCY DEPT VISIT HI MDM: CPT

## 2024-06-08 PROCEDURE — 87040 BLOOD CULTURE FOR BACTERIA: CPT

## 2024-06-08 PROCEDURE — 85018 HEMOGLOBIN: CPT

## 2024-06-08 PROCEDURE — 82803 BLOOD GASES ANY COMBINATION: CPT

## 2024-06-08 PROCEDURE — 83605 ASSAY OF LACTIC ACID: CPT

## 2024-06-08 PROCEDURE — 87641 MR-STAPH DNA AMP PROBE: CPT

## 2024-06-08 PROCEDURE — 85027 COMPLETE CBC AUTOMATED: CPT

## 2024-06-08 PROCEDURE — 85610 PROTHROMBIN TIME: CPT

## 2024-06-08 PROCEDURE — 99239 HOSP IP/OBS DSCHRG MGMT >30: CPT

## 2024-06-08 PROCEDURE — 80048 BASIC METABOLIC PNL TOTAL CA: CPT

## 2024-06-08 PROCEDURE — 96365 THER/PROPH/DIAG IV INF INIT: CPT

## 2024-06-08 PROCEDURE — 80053 COMPREHEN METABOLIC PANEL: CPT

## 2024-06-08 PROCEDURE — 82947 ASSAY GLUCOSE BLOOD QUANT: CPT

## 2024-06-08 PROCEDURE — 73630 X-RAY EXAM OF FOOT: CPT

## 2024-06-08 PROCEDURE — 82962 GLUCOSE BLOOD TEST: CPT

## 2024-06-08 PROCEDURE — 97163 PT EVAL HIGH COMPLEX 45 MIN: CPT

## 2024-06-08 PROCEDURE — 85014 HEMATOCRIT: CPT

## 2024-06-08 RX ORDER — WARFARIN SODIUM 2.5 MG/1
5 TABLET ORAL ONCE
Refills: 0 | Status: DISCONTINUED | OUTPATIENT
Start: 2024-06-08 | End: 2024-06-08

## 2024-06-08 RX ADMIN — Medication 10 UNIT(S): at 12:32

## 2024-06-08 RX ADMIN — Medication 10 UNIT(S): at 17:42

## 2024-06-08 RX ADMIN — Medication 2000 MILLIGRAM(S): at 05:41

## 2024-06-08 RX ADMIN — Medication 100 MILLIGRAM(S): at 05:42

## 2024-06-08 RX ADMIN — Medication 0.25 MILLIGRAM(S): at 12:42

## 2024-06-08 RX ADMIN — Medication 40 MILLIGRAM(S): at 05:41

## 2024-06-08 RX ADMIN — LISINOPRIL 10 MILLIGRAM(S): 2.5 TABLET ORAL at 05:41

## 2024-06-08 RX ADMIN — Medication 100 MICROGRAM(S): at 05:41

## 2024-06-08 RX ADMIN — Medication 10 UNIT(S): at 09:20

## 2024-06-08 RX ADMIN — Medication 2000 MILLIGRAM(S): at 17:44

## 2024-06-08 RX ADMIN — Medication 1: at 09:21

## 2024-06-08 RX ADMIN — Medication 1: at 12:32

## 2024-06-08 RX ADMIN — Medication 1000 UNIT(S): at 12:35

## 2024-06-08 RX ADMIN — RANOLAZINE 500 MILLIGRAM(S): 500 TABLET, FILM COATED, EXTENDED RELEASE ORAL at 05:42

## 2024-06-08 RX ADMIN — CLOPIDOGREL BISULFATE 75 MILLIGRAM(S): 75 TABLET, FILM COATED ORAL at 12:33

## 2024-06-08 RX ADMIN — RANOLAZINE 500 MILLIGRAM(S): 500 TABLET, FILM COATED, EXTENDED RELEASE ORAL at 17:44

## 2024-06-08 NOTE — DISCHARGE NOTE NURSING/CASE MANAGEMENT/SOCIAL WORK - PATIENT PORTAL LINK FT
You can access the FollowMyHealth Patient Portal offered by [facility] by registering at the following website: [email or web address]. By joining [facility]’s FollowMyHealth portal, you will also be able to view your health information using other applications (apps) compatible with our system.

## 2024-06-08 NOTE — DISCHARGE NOTE NURSING/CASE MANAGEMENT/SOCIAL WORK - NSDCPEFALRISK_GEN_ALL_CORE
For information on Fall & Injury Prevention, visit: [email or web address] OR  [email or web address] OR  https://www.cdc.gov/steadi/patient.html

## 2024-06-10 LAB
CULTURE RESULTS: SIGNIFICANT CHANGE UP
CULTURE RESULTS: SIGNIFICANT CHANGE UP
SPECIMEN SOURCE: SIGNIFICANT CHANGE UP
SPECIMEN SOURCE: SIGNIFICANT CHANGE UP

## 2024-06-13 RX ORDER — FLASH GLUCOSE SENSOR
KIT MISCELLANEOUS
Qty: 6 | Refills: 1 | Status: ACTIVE | COMMUNITY
Start: 2022-01-06 | End: 1900-01-01

## 2024-06-17 RX ORDER — FLASH GLUCOSE SCANNING READER
EACH MISCELLANEOUS
Qty: 1 | Refills: 0 | Status: ACTIVE | COMMUNITY
Start: 2022-03-10 | End: 1900-01-01

## 2024-07-02 RX ORDER — EZETIMIBE 10 MG/1
1 TABLET ORAL
Refills: 0 | DISCHARGE

## 2024-07-02 RX ORDER — ATORVASTATIN CALCIUM 80 MG/1
1 TABLET, FILM COATED ORAL
Qty: 0 | Refills: 0 | DISCHARGE

## 2024-07-02 RX ORDER — WARFARIN SODIUM 2.5 MG/1
1 TABLET ORAL
Refills: 0 | DISCHARGE

## 2024-07-02 RX ORDER — INSULIN LISPRO 100/ML
6 VIAL (ML) SUBCUTANEOUS
Qty: 0 | Refills: 0 | DISCHARGE

## 2024-07-02 RX ORDER — RAMIPRIL 5 MG
0 CAPSULE ORAL
Refills: 0 | DISCHARGE

## 2024-07-02 RX ORDER — GLIMEPIRIDE 1 MG
1 TABLET ORAL
Qty: 0 | Refills: 0 | DISCHARGE

## 2024-07-02 RX ORDER — FUROSEMIDE 40 MG
1 TABLET ORAL
Qty: 0 | Refills: 0 | DISCHARGE

## 2024-07-02 RX ORDER — RANOLAZINE 500 MG/1
0 TABLET, FILM COATED, EXTENDED RELEASE ORAL
Refills: 0 | DISCHARGE

## 2024-07-02 RX ORDER — CLOPIDOGREL BISULFATE 75 MG/1
1 TABLET, FILM COATED ORAL
Refills: 0 | DISCHARGE

## 2024-07-02 RX ORDER — INSULIN DETEMIR 100/ML (3)
40 INSULIN PEN (ML) SUBCUTANEOUS
Qty: 0 | Refills: 0 | DISCHARGE

## 2024-07-02 RX ORDER — METOPROLOL TARTRATE 50 MG
1 TABLET ORAL
Refills: 0 | DISCHARGE

## 2024-07-02 RX ORDER — RAMIPRIL 5 MG
1 CAPSULE ORAL
Qty: 0 | Refills: 0 | DISCHARGE

## 2024-07-02 RX ORDER — LEVOTHYROXINE SODIUM 125 MCG
1 TABLET ORAL
Qty: 0 | Refills: 0 | DISCHARGE

## 2024-07-02 RX ORDER — EMPAGLIFLOZIN 10 MG/1
1 TABLET, FILM COATED ORAL
Refills: 0 | DISCHARGE

## 2024-07-02 RX ORDER — ATORVASTATIN CALCIUM 80 MG/1
1 TABLET, FILM COATED ORAL
Refills: 0 | DISCHARGE

## 2024-07-02 RX ORDER — CHOLECALCIFEROL (VITAMIN D3) 125 MCG
1 CAPSULE ORAL
Refills: 0 | DISCHARGE

## 2024-07-02 RX ORDER — CLOPIDOGREL BISULFATE 75 MG/1
1 TABLET, FILM COATED ORAL
Qty: 0 | Refills: 0 | DISCHARGE

## 2024-07-02 RX ORDER — ASPIRIN/CALCIUM CARB/MAGNESIUM 324 MG
1 TABLET ORAL
Qty: 0 | Refills: 0 | DISCHARGE

## 2024-07-02 RX ORDER — NITROGLYCERIN 6.5 MG
1 CAPSULE, EXTENDED RELEASE ORAL
Qty: 0 | Refills: 0 | DISCHARGE

## 2024-07-02 RX ORDER — LEVOTHYROXINE SODIUM 125 MCG
1 TABLET ORAL
Refills: 0 | DISCHARGE

## 2024-07-02 RX ORDER — ALPRAZOLAM 0.25 MG
1 TABLET ORAL
Refills: 0 | DISCHARGE

## 2024-07-02 RX ORDER — INSULIN DETEMIR 100/ML (3)
44 INSULIN PEN (ML) SUBCUTANEOUS
Qty: 0 | Refills: 0 | DISCHARGE

## 2024-07-02 RX ORDER — SPIRONOLACTONE 25 MG/1
1 TABLET, FILM COATED ORAL
Qty: 0 | Refills: 0 | DISCHARGE

## 2024-07-02 RX ORDER — TICAGRELOR 90 MG/1
0 TABLET ORAL
Qty: 0 | Refills: 0 | DISCHARGE

## 2024-07-02 RX ORDER — INSULIN DETEMIR 100/ML (3)
10 INSULIN PEN (ML) SUBCUTANEOUS
Refills: 0 | DISCHARGE

## 2024-07-02 RX ORDER — METOPROLOL TARTRATE 50 MG
1 TABLET ORAL
Qty: 0 | Refills: 0 | DISCHARGE

## 2024-07-02 RX ORDER — INSULIN ASPART 100 [IU]/ML
10 INJECTION, SOLUTION SUBCUTANEOUS
Refills: 0 | DISCHARGE

## 2024-07-02 RX ORDER — FUROSEMIDE 40 MG
1 TABLET ORAL
Refills: 0 | DISCHARGE

## 2024-07-02 RX ORDER — ALLOPURINOL 300 MG
100 TABLET ORAL
Qty: 0 | Refills: 0 | DISCHARGE

## 2024-07-02 RX ORDER — TRAMADOL HYDROCHLORIDE 50 MG/1
1 TABLET ORAL
Qty: 0 | Refills: 0 | DISCHARGE
End: 2024-06-10

## 2024-07-09 LAB — HBA1C MFR BLD HPLC: 7.4

## 2024-07-10 ENCOUNTER — APPOINTMENT (OUTPATIENT)
Dept: ENDOCRINOLOGY | Facility: CLINIC | Age: 89
End: 2024-07-10
Payer: MEDICARE

## 2024-07-10 VITALS
DIASTOLIC BLOOD PRESSURE: 72 MMHG | WEIGHT: 165 LBS | SYSTOLIC BLOOD PRESSURE: 122 MMHG | BODY MASS INDEX: 29.23 KG/M2 | HEART RATE: 62 BPM | HEIGHT: 63 IN | OXYGEN SATURATION: 95 %

## 2024-07-10 DIAGNOSIS — E03.9 HYPOTHYROIDISM, UNSPECIFIED: ICD-10-CM

## 2024-07-10 DIAGNOSIS — E78.5 HYPERLIPIDEMIA, UNSPECIFIED: ICD-10-CM

## 2024-07-10 DIAGNOSIS — Z79.4 TYPE 2 DIABETES MELLITUS W/OUT COMPLICATIONS: ICD-10-CM

## 2024-07-10 DIAGNOSIS — E11.9 TYPE 2 DIABETES MELLITUS W/OUT COMPLICATIONS: ICD-10-CM

## 2024-07-10 LAB — GLUCOSE BLDC GLUCOMTR-MCNC: 176

## 2024-07-10 PROCEDURE — 95251 CONT GLUC MNTR ANALYSIS I&R: CPT

## 2024-07-10 PROCEDURE — 99204 OFFICE O/P NEW MOD 45 MIN: CPT

## 2024-07-10 PROCEDURE — 82962 GLUCOSE BLOOD TEST: CPT

## 2024-08-05 ENCOUNTER — APPOINTMENT (OUTPATIENT)
Dept: ORTHOPEDIC SURGERY | Facility: CLINIC | Age: 89
End: 2024-08-05

## 2024-08-05 PROCEDURE — G2211 COMPLEX E/M VISIT ADD ON: CPT

## 2024-08-05 PROCEDURE — 99204 OFFICE O/P NEW MOD 45 MIN: CPT

## 2024-08-05 PROCEDURE — 73502 X-RAY EXAM HIP UNI 2-3 VIEWS: CPT

## 2024-08-05 NOTE — HISTORY OF PRESENT ILLNESS
[Pain Location] : pain [Worsening] : worsening [8] : a current pain level of 8/10 [Walking] : worsened by walking [Opioid Analgesics] : relieved by opioid analgesics [Rest] : relieved by rest [de-identified] : This is 89-year-old female with severe b/l  hip pain for about 1 year.  Left worse than right. She is walking with walker.  She states that she is currently enrolled in palliative care from the cardiac standpoint she does not wishes to have any surgery she is looking for any remedies to decrease her pain Patient takes Plavix and Coumadin. Pt is taking tramadol.

## 2024-08-05 NOTE — DISCUSSION/SUMMARY
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 The left hip is giving her more pain than the right. The nature of condition and treatment options were discussed in detail. Pt is a candidate for b/l TAB. However, pt's cardiologist asked the pt to stay away from any operation at this time considering her condition. I discussed conservative treatment such as cortisone injections, PT, anti-inflammatories, and low impact exercise. Pt has had physical therapy.  She should continue to do low impact exercises. She may take Tylenol and NSAIDs as needed. I ordered a US-guided IA injection for her left hip. F/u in 3 months for re-evaluation.   The patient is a 89 year individual with end stage arthritis of their bilateral hip joint. Based upon the patient's continued symptoms and failure to respond to conservative treatment (such as cortisone injections, HA injections, PT, low impact exercise) I have recommended a bilateral total hip arthroplasty for this patient. A long discussion took place with the patient describing what a total joint replacement is and what the procedure would entail. A total hip arthroplasty model, similar to the implant that will be used during the operation, was utilized to demonstrate and to discuss the various bearing surfaces of the implants. The hospitalization and post-operative care and rehabilitation were also discussed. The use of perioperative antibiotics and DVT prophylaxis were discussed. The risk, benefits and alternatives to a surgical intervention were discussed at length with the patient. The patient was also advised of risks related to the medical comorbidities, elevated body mass index (BMI), and smoking where applicable. We discussed how to reduce modifiable risk factors and encouraged smoking cessation were applicable. A lengthy discussion took place to review the most common complications including but not limited to: deep vein thrombosis, pulmonary embolus, heart attack, stroke, infection, wound breakdown, numbness, damage to nerves, tendon, muscles, arteries or other blood vessels, death and other possible complications from anesthesia. The patient was told that we will take steps to minimize these risks by using sterile technique, antibiotics and DVT prophylaxis when appropriate and follow the patient postoperatively in the office setting to monitor progress. The possibility of recurrent pain, no improvement in pain and actual worsening of pain were also discussed with the patient.     The discharge plan of care focused on the patient going home following surgery. The patient was encouraged to make the necessary arrangements to have someone stay with them when they are discharged home. Following discharge, a home care nurse will visit the patient. The home care nurse will open your home care case and request home physical therapy services. Home physical therapy will commence following discharge provided it is appropriate and covered by the health insurance benefit plan.     The benefits of surgery were discussed with the patient including the potential for improving her current clinical condition through operative intervention. Alternatives to surgical intervention including continued conservative management were also discussed in detail. All questions were answered to the satisfaction of the patient. The treatment plan of care, as well as a model of a total hip arthroplasty equivalent to the one that will be used for their total joint replacement, was shared with the patient. The patient agreed to the plan of care as well as the use of implants in their total joint replacement.

## 2024-08-05 NOTE — REVIEW OF SYSTEMS
[Joint Pain] : joint pain [Joint Stiffness] : joint stiffness [Joint Swelling] : joint swelling [Negative] : Heme/Lymph [FreeTextEntry9] : left hip

## 2024-08-05 NOTE — PHYSICAL EXAM
[Antalgic] : antalgic [Walker] : ambulates with walker [LE] : Sensory: Intact in bilateral lower extremities [ALL] : dorsalis pedis, posterior tibial, femoral, popliteal, and radial 2+ and symmetric bilaterally [Normal] : Alert and in no acute distress [Poor Appearance] : well-appearing [de-identified] : GENERAL APPEARANCE: Well nourished and hydrated, pleasant, alert, and oriented x 3. Appears their stated age.  HEENT: Normocephalic, extraocular eye motion intact. Nasal septum midline. Oral cavity clear. External auditory canal clear.  RESPIRATORY: Breath sounds clear and audible in all lobes. No wheezing, No accessory muscle use. CARDIOVASCULAR: No apparent abnormalities. No lower leg edema. No varicosities. Pedal pulses are palpable. NEUROLOGIC: Sensation is normal, no muscle weakness in the upper or lower extremities. DERMATOLOGIC: No apparent skin lesions, moist, warm, no rash. SPINE: Cervical spine appears normal and moves freely; thoracic spine appears normal and moves freely; lumbosacral spine appears normal and moves freely, normal, nontender. MUSCULOSKELETAL: Hands, wrists, and elbows are normal and move freely, shoulders are normal and move freely.  Musculoskeletal 5/5 motor strength in bilateral lower extremities. Sensory: Intact in bilateral lower extremities. DTRs: Biceps, brachioradialis, triceps, patellar, ankle and plantar 2+ and symmetric bilaterally. Pulses: dorsalis pedis, posterior tibial, femoral, popliteal, and radial 2+ and symmetric bilaterally.  Constitutional: Alert and in no acute distress, but well-appearing.   [de-identified] : She has severe antalgic gait and pain with gentle logroll of the bilateral hip joint, she has significant stiffness of the hip joint. Pain in SLR, ROM, and positive Stinchfield for b/l hips. [de-identified] : 3V xray of the left hip done in the office today and reviewed by Dr. 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

## 2024-08-19 ENCOUNTER — APPOINTMENT (OUTPATIENT)
Dept: INTERVENTIONAL RADIOLOGY/VASCULAR | Facility: CLINIC | Age: 89
End: 2024-08-19

## 2024-09-12 ENCOUNTER — APPOINTMENT (OUTPATIENT)
Dept: INTERVENTIONAL RADIOLOGY/VASCULAR | Facility: CLINIC | Age: 89
End: 2024-09-12
Payer: MEDICARE

## 2024-09-12 PROCEDURE — 73525 CONTRAST X-RAY OF HIP: CPT | Mod: 26,LT

## 2024-09-12 PROCEDURE — 27093 INJECTION FOR HIP X-RAY: CPT | Mod: LT

## 2024-09-23 ENCOUNTER — APPOINTMENT (OUTPATIENT)
Dept: CARDIOTHORACIC SURGERY | Facility: CLINIC | Age: 89
End: 2024-09-23
Payer: MEDICARE

## 2024-09-23 VITALS
BODY MASS INDEX: 28.7 KG/M2 | OXYGEN SATURATION: 96 % | WEIGHT: 162 LBS | DIASTOLIC BLOOD PRESSURE: 78 MMHG | SYSTOLIC BLOOD PRESSURE: 145 MMHG | HEART RATE: 68 BPM | HEIGHT: 63 IN | RESPIRATION RATE: 18 BRPM

## 2024-09-23 DIAGNOSIS — I25.118 ATHEROSCLEROTIC HEART DISEASE OF NATIVE CORONARY ARTERY WITH OTHER FORMS OF ANGINA PECTORIS: ICD-10-CM

## 2024-09-23 DIAGNOSIS — I38 ENDOCARDITIS, VALVE UNSPECIFIED: ICD-10-CM

## 2024-09-23 DIAGNOSIS — R93.1 ABNORMAL FINDINGS ON DIAGNOSTIC IMAGING OF HEART AND CORONARY CIRCULATION: ICD-10-CM

## 2024-09-23 DIAGNOSIS — I05.0 RHEUMATIC MITRAL STENOSIS: ICD-10-CM

## 2024-09-23 DIAGNOSIS — I10 ESSENTIAL (PRIMARY) HYPERTENSION: ICD-10-CM

## 2024-09-23 DIAGNOSIS — I50.30 UNSPECIFIED DIASTOLIC (CONGESTIVE) HEART FAILURE: ICD-10-CM

## 2024-09-23 PROCEDURE — 99204 OFFICE O/P NEW MOD 45 MIN: CPT

## 2024-09-23 RX ORDER — EZETIMIBE 10 MG/1
10 TABLET ORAL DAILY
Refills: 0 | Status: ACTIVE | COMMUNITY
Start: 2024-09-23

## 2024-09-23 RX ORDER — LOSARTAN POTASSIUM 25 MG/1
25 TABLET, FILM COATED ORAL DAILY
Refills: 0 | Status: ACTIVE | COMMUNITY
Start: 2024-09-23

## 2024-09-23 RX ORDER — NITROGLYCERIN 400 UG/1
0.4 SPRAY ORAL
Refills: 0 | Status: ACTIVE | COMMUNITY
Start: 2024-09-23

## 2024-09-23 RX ORDER — WARFARIN 5 MG/1
5 TABLET ORAL DAILY
Refills: 0 | Status: ACTIVE | COMMUNITY
Start: 2024-09-23

## 2024-09-27 NOTE — PHYSICAL EXAM
[No Acute Distress] : no acute distress [No Carotid Bruit] : no carotid bruit [Normal S1, S2] : normal S1, S2 [Murmur] : murmur [Clear Lung Fields] : clear lung fields [Good Air Entry] : good air entry [Edema ___] : edema [unfilled] [Normal] : alert and oriented, normal memory [de-identified] : I/VI diastolic Murmur

## 2024-09-27 NOTE — REASON FOR VISIT
[Structural Heart and Valve Disease] : structural heart and valve disease [Spouse] : spouse [FreeTextEntry1] : SMALL: Reported mitral stenosis with a prior MV Repair (24mm Physio Ring), for which we are recommending a JAS for further evaluation. I explained that if she has MS (and not PPM), she will then need a CT to determine if a MVIR is feasible. I discussed this in detail with her daughter Josselyn by phone as well. She will think about it and call to schedule her JAS if she wishes to proceed. [FreeTextEntry3] : Dr. Mireles

## 2024-09-27 NOTE — HISTORY OF PRESENT ILLNESS
Addended by: CHRIS RG on: 1/13/2023 10:15 AM     Modules accepted: Orders     [FreeTextEntry1] : Ms. Cerrato is referred to our clinic today by Dr. Otero for evaluation of her Mitral Stenosis. She has a past medical history of a CABG in 1998, a re-do Sternotomy (in 2012 @ [facility] with Dr. Hoskins) with a Mitral Valve repair (24 Physio ring) and Tricuspid Repair with a 26 mm Triad Annuloplasty ring, Type 2 DM, TIA, HFrEF, HTN, HLD and an Ascending Aortic Aneurysm.  Today she comes in stating she feels OK. She is limited in her activity because of significant arthritis in her knees and hips, but she denies any SOB/YOUNG, but she will get occasional CP for which she takes Nitroglycerine. She gets pedal edema and is easily fatigued. She sleeps with one pillow at night, and has not woken up with SOB.  She had a Transthoracic Echocardiogram today which was evaluated with Dr. Magdalena Fritz with her impressions as follows: Peak/mean gradients across the mitral are 27/13mmHg.  Mild MR. RV is dilated with likely reduced function.  Mild TR.  She will need a JAS for further assessment of her MV (and degree of MS) to determine treatment options and next steps (which would include a CT if a MVIR is to be considered).   She states "I went to 8 different surgeons" about 3 years ago to discuss options for treating her mitral stenosis. She states she saw Dr Chappell who advised she was a 3rd time surgery and minimally symptomatic at that time and should continue to be closely monitored. She notes "once in a while I get pain over here, it is like a stabbing pain" as she points to her left upper chest (infraclavicular). Her daughter states she previously received opinions from [facility], [facility], [facility], and [facility]. She states "they had a meeting and they said it was too risky" but it is unclear to what specific procedure her daughter Josselyn is referring. She states "right now she is OK, she gets these hot flashes" and was recently evaluated at [place] for bradycardia to the 40's, which the family attributes to her having taken Tramadol at that time for hip pain. She has since received a Cortisone injection for the hip pain with marked improvement. She states that when she was at [place], Dr Suarez recommended a "catheter in catheter" procedure, per her description, that I suspect is referring to a MItral Valve in Ring procedure. She does admit to YOUNG and notes "I really can't do much".   NYHA Classification: STS 30 day Mortality Risk Score: MVR 9.46%/ MV Repair 3.34%

## 2024-09-27 NOTE — DISCUSSION/SUMMARY
[FreeTextEntry1] : Ms. WINKLER has reported mitral stenosis with a prior MV Repair (24mm Physio Ring), for which we are recommending a JAS for further evaluation. I explained that if she has MS (and not PPM), she will then need a CT to determine if a MVIR is feasible. I discussed this in detail with her daughter Josselyn by phone as well. She will think about it and call to schedule her JAS if she wishes to proceed. All of their questions were answered in detail.

## 2024-09-27 NOTE — HISTORY OF PRESENT ILLNESS
[FreeTextEntry1] : Ms. Cerrato is referred to our clinic today by Dr. Otero for evaluation of her Mitral Stenosis. She has a past medical history of a CABG in 1998, a re-do Sternotomy (in 2012 @ [facility] with Dr. Hoskins) with a Mitral Valve repair (24 Physio ring) and Tricuspid Repair with a 26 mm Triad Annuloplasty ring, Type 2 DM, TIA, HFrEF, HTN, HLD and an Ascending Aortic Aneurysm.  Today she comes in stating she feels OK. She is limited in her activity because of significant arthritis in her knees and hips, but she denies any SOB/YOUNG, but she will get occasional CP for which she takes Nitroglycerine. She gets pedal edema and is easily fatigued. She sleeps with one pillow at night, and has not woken up with SOB.  She had a Transthoracic Echocardiogram today which was evaluated with Dr. Magdalena Fritz with her impressions as follows: Peak/mean gradients across the mitral are 27/13mmHg.  Mild MR. RV is dilated with likely reduced function.  Mild TR.  She will need a JAS for further assessment of her MV (and degree of MS) to determine treatment options and next steps (which would include a CT if a MVIR is to be considered).   She states "I went to 8 different surgeons" about 3 years ago to discuss options for treating her mitral stenosis. She states she saw Dr Chappell who advised she was a 3rd time surgery and minimally symptomatic at that time and should continue to be closely monitored. She notes "once in a while I get pain over here, it is like a stabbing pain" as she points to her left upper chest (infraclavicular). Her daughter states she previously received opinions from [facility], [facility], [facility], and [facility]. She states "they had a meeting and they said it was too risky" but it is unclear to what specific procedure her daughter Josselyn is referring. She states "right now she is OK, she gets these hot flashes" and was recently evaluated at [place] for bradycardia to the 40's, which the family attributes to her having taken Tramadol at that time for hip pain. She has since received a Cortisone injection for the hip pain with marked improvement. She states that when she was at [place], Dr Suarez recommended a "catheter in catheter" procedure, per her description, that I suspect is referring to a MItral Valve in Ring procedure. She does admit to YOUNG and notes "I really can't do much".   NYHA Classification: STS 30 day Mortality Risk Score: MVR 9.46%/ MV Repair 3.34%

## 2024-09-27 NOTE — PHYSICAL EXAM
[No Acute Distress] : no acute distress [No Carotid Bruit] : no carotid bruit [Normal S1, S2] : normal S1, S2 [Murmur] : murmur [Clear Lung Fields] : clear lung fields [Good Air Entry] : good air entry [Edema ___] : edema [unfilled] [Normal] : alert and oriented, normal memory [de-identified] : I/VI diastolic Murmur

## 2024-09-27 NOTE — PHYSICAL EXAM
[No Acute Distress] : no acute distress [No Carotid Bruit] : no carotid bruit [Normal S1, S2] : normal S1, S2 [Murmur] : murmur [Clear Lung Fields] : clear lung fields [Good Air Entry] : good air entry [Edema ___] : edema [unfilled] [Normal] : alert and oriented, normal memory [de-identified] : I/VI diastolic Murmur

## 2024-09-27 NOTE — REVIEW OF SYSTEMS
[Feeling Fatigued] : feeling fatigued [Chest Discomfort] : chest discomfort [Lower Ext Edema] : lower extremity edema [Joint Pain] : joint pain [Negative] : Heme/Lymph [Headache] : no headache [SOB] : no shortness of breath [Dyspnea on exertion] : not dyspnea during exertion [FreeTextEntry9] : See HPI

## 2025-03-10 NOTE — DIETITIAN INITIAL EVALUATION ADULT. - NUTRITION INTERVENTION
[facility] pharmacy requesting medication increase on behalf of pt for tirzepatide (MOUNJARO) 2.5 mg/0.5 mL PnIj.     Please advise. Thanks   Meals and Snack/Feeding Assistance/Nutrition Education